# Patient Record
Sex: FEMALE | Employment: OTHER | URBAN - METROPOLITAN AREA
[De-identification: names, ages, dates, MRNs, and addresses within clinical notes are randomized per-mention and may not be internally consistent; named-entity substitution may affect disease eponyms.]

---

## 2020-12-07 ENCOUNTER — TELEPHONE (OUTPATIENT)
Dept: OBGYN | Facility: CLINIC | Age: 35
End: 2020-12-07

## 2020-12-07 ENCOUNTER — PRENATAL OFFICE VISIT (OUTPATIENT)
Dept: NURSING | Facility: CLINIC | Age: 35
End: 2020-12-07
Payer: COMMERCIAL

## 2020-12-07 VITALS — HEIGHT: 62 IN

## 2020-12-07 DIAGNOSIS — Z34.90 SUPERVISION OF NORMAL PREGNANCY: Primary | ICD-10-CM

## 2020-12-07 DIAGNOSIS — O21.9 NAUSEA AND VOMITING IN PREGNANCY: Primary | ICD-10-CM

## 2020-12-07 PROCEDURE — 99207 PR NO CHARGE NURSE ONLY: CPT

## 2020-12-07 RX ORDER — PRENATAL VIT/IRON FUM/FOLIC AC 27MG-0.8MG
1 TABLET ORAL DAILY
COMMUNITY

## 2020-12-07 SDOH — ECONOMIC STABILITY: FOOD INSECURITY: WITHIN THE PAST 12 MONTHS, YOU WORRIED THAT YOUR FOOD WOULD RUN OUT BEFORE YOU GOT MONEY TO BUY MORE.: NOT ASKED

## 2020-12-07 SDOH — ECONOMIC STABILITY: TRANSPORTATION INSECURITY
IN THE PAST 12 MONTHS, HAS LACK OF TRANSPORTATION KEPT YOU FROM MEETINGS, WORK, OR FROM GETTING THINGS NEEDED FOR DAILY LIVING?: NOT ASKED

## 2020-12-07 SDOH — HEALTH STABILITY: MENTAL HEALTH: HOW OFTEN DO YOU HAVE A DRINK CONTAINING ALCOHOL?: NOT ASKED

## 2020-12-07 SDOH — HEALTH STABILITY: MENTAL HEALTH: HOW MANY STANDARD DRINKS CONTAINING ALCOHOL DO YOU HAVE ON A TYPICAL DAY?: NOT ASKED

## 2020-12-07 SDOH — ECONOMIC STABILITY: INCOME INSECURITY: HOW HARD IS IT FOR YOU TO PAY FOR THE VERY BASICS LIKE FOOD, HOUSING, MEDICAL CARE, AND HEATING?: NOT ASKED

## 2020-12-07 SDOH — ECONOMIC STABILITY: FOOD INSECURITY: WITHIN THE PAST 12 MONTHS, THE FOOD YOU BOUGHT JUST DIDN'T LAST AND YOU DIDN'T HAVE MONEY TO GET MORE.: NOT ASKED

## 2020-12-07 SDOH — HEALTH STABILITY: MENTAL HEALTH: HOW OFTEN DO YOU HAVE 6 OR MORE DRINKS ON ONE OCCASION?: NOT ASKED

## 2020-12-07 SDOH — ECONOMIC STABILITY: TRANSPORTATION INSECURITY
IN THE PAST 12 MONTHS, HAS THE LACK OF TRANSPORTATION KEPT YOU FROM MEDICAL APPOINTMENTS OR FROM GETTING MEDICATIONS?: NOT ASKED

## 2020-12-07 NOTE — TELEPHONE ENCOUNTER
I did this pt's NPN phone visit today.  She is requesting medication for nausea.  I advised B6 and unisom, she states in her last pregnancy that combo made her feel horrible.  Discussed bland diet, daron, eating small frequent amounts, etc.    Can you send in Rx?  Pharmacy selected.    Regi Sawyer RN

## 2020-12-07 NOTE — PROGRESS NOTES
NPN nurse visit done over the phone. Pt will be given NPN folder and book at her upcoming appt.   Discussed optional screening available to assess chromosomal anomalies. Questions answered. Pt advised to call the clinic if she has any questions or concerns related to her pregnancy. Prenatal labs will be obtained at her upcoming appt. New prenatal visit scheduled on 12/15/220 with Dr Kinney.    6w3d    Last PAP 5/24/19-Normal         Patient supplied answers from flow sheet for:  Prenatal OB Questionnaire.  Past Medical History  Diabetes?: (!) Yes(GDM with first pregnancy)  Hypertension : No  Heart disease, mitral valve prolapse or rheumatic fever?: No  An autoimmune disease such as lupus or rheumatoid arthritis?: No  Kidney disease or urinary tract infection?: No  Epilepsy, seizures or spells?: No  Migraine headaches?: No  A stroke or loss of function or sensation?: No  Any other neurological problems?: No  Have you ever been treated for depression?: No  Are you having problems with crying spells or loss of self-esteem?: No  Have you ever required psychiatric care?: No  Have you ever had hepatitis, liver disease or jaundice?: No  Have you been treated for blood clots in your veins, deep vein thromosis, inflammation in the veins, thrombosis, phlebitis, pulmonary embolism or varicosities?: No  Have you had excessive bleeding after surgery or dental work?: No  Do you bleed more than other women after a cut or scratch?: No  Do you have a history of anemia?: No  Have you ever had thyroid problems or taken thyroid medication?: No   Do you have any endocrine problems?: No  Have you ever been in a major accident or suffered serious trauma?: No  Within the last year, has anyone hit, slapped, kicked or otherwise hurt you?: No  In the last year, has anyone forced you to have sex when you didn't want to?: No    Past Medical History 2   Have you ever received a blood transfusion?: No  Would you refuse a blood transfusion if a  doctor judged it to be medically necessary?: No   If you answered Yes, would you rather die than receive a blood transfusion?: Unknown  If you answered Yes, is this for Spiritism reasons?: Unknown  Does anyone in your home smoke?: No  Do you use tobacco products?: No  Do you drink beer, wine or hard liquor?: No  Do you use any of the following: marijuana, speed, cocaine, heroin, hallucinogens or other drugs?: No   Is your blood type Rh negative?: No  Have you ever had abnormal antibodies in your blood?: No  Have you ever had asthma?: No  Have you ever had tuberculosis?: No  Do you have any allergies to drugs or over-the-counter medications?: No  Allergies: Dust Mites, Aspartame, Ethanol, Venlafaxine, Hydrochloride, Sertraline: No  Have you had any breast problems?: No  Have you ever ?: No  Have you had any gynecological surgical procedures such as cervical conization, a LEEP procedure, laser treatment, cryosurgery of the cervix or a dilation and curettage, etc?: No  Have you ever had any other surgical procedures?: No  Have you been hospitalized for a nonsurgical reason excluding normal delivery?: No  Have you ever had any anesthetic complications?: No  Have you ever had an abnormal pap smear?: No    Past Medical History (Continued)  Do you have a history of abnormalities of the uterus?: Unknown  Did your mother take CHEMA or any other hormones when she was pregnant with you?: Unknown  Did it take you more than a year to become pregnant?: No  Have you ever been evaluated or treated for infertility?: No  Is there a history of medical problems in your family, which you feel may be important to this pregnancy?: No  Do you have any other problems we have not asked about which you feel may be important to this pregnancy?: No    Symptoms since last menstrual period  Do you have any of the following symptoms: abdominal pain, blood in stools or urine, chest pain, shortness of breath, coughing or vomiting up blood,  your heart racing or skipping beats, nausea and vomiting, pain on urination or vaginal discharge or bleed: No  Will the patient be 35 years old or older at the time of delivery?: No    Has the patient, baby's father or anyone in either family had:  Thalassemia (Italian, Greek, Mediterranean or  background only) and an MCV result less than 80?: No  Neural tube defect such as meningomyelocele, spina bifida or anencephaly?: No  Congenital heart defect?: No  Down's Syndrome?: No  Konrad-Sachs disease (Yazidi, Cajun, Belarusian-Ventress)?: No  Sickle cell disease or trait ()?: No  Hemophilia or other inherited problems of blood?: No  Muscular dystrophy?: No  Cystic fibrosis?: No  Beaver's chorea?: No  Mental retardation/autism?: No  If yes, was the person tested for fragile X?: No  Any other inherited genetic or chromosomal disorder?: No  Maternal metabolic disorder (e.g Insulin-dependent diabetes, PKU)?: No  A child with birth defects not listed above?: No  Recurrent pregnancy loss or stillbirth?: No   Has the patient had any medications/street drugs/alcohol since her last menstrual period?: No  Does the patient or baby's father have any other genetic risks?: No    Infection History   Do you object to being tested for Hepatitis B?: No  Do you object to being tested for HIV?: No   Do you feel that you are at high risk for coming in contact with the AIDS virus?: No  Have you ever been treated for tuberculosis?: No  Have you ever had a positive skin test for tuberculosis?: No  Do you live with someone who has tuberculosis?: No  Have you ever been exposed to tuberculosis?: No  Do you have genital herpes?: No  Does your partner have genital herpes?: No  Have you had a viral illness since your last period?: No  Have you ever had gonorrhea, chlamydia, syphilis, venereal warts, trichomoniasis, pelvic inflammatory disease or any other sexually transmitted disease?: No  Do you know if you are a genital group B  streptococcus carrier?: No  Have you had chicken pox/varicella?: No   Have you been vaccinated against chicken Pox?: (!) Yes  Have you had any other infectious diseases?: No

## 2020-12-08 RX ORDER — ONDANSETRON 4 MG/1
4 TABLET, ORALLY DISINTEGRATING ORAL EVERY 8 HOURS PRN
Qty: 30 TABLET | Refills: 0 | Status: SHIPPED | OUTPATIENT
Start: 2020-12-08 | End: 2020-12-15

## 2020-12-09 ENCOUNTER — ANCILLARY PROCEDURE (OUTPATIENT)
Dept: ULTRASOUND IMAGING | Facility: CLINIC | Age: 35
End: 2020-12-09
Payer: COMMERCIAL

## 2020-12-09 DIAGNOSIS — Z34.90 SUPERVISION OF NORMAL PREGNANCY: ICD-10-CM

## 2020-12-09 PROCEDURE — 76801 OB US < 14 WKS SINGLE FETUS: CPT | Performed by: OBSTETRICS & GYNECOLOGY

## 2020-12-10 NOTE — RESULT ENCOUNTER NOTE
Inform patient that her dating ultrasound showed a live intrauterine pregnancy with a heart beat of 176 beats per minute. Her final due date will remain July 4th which is based on her LMP and consistent with the ultrasound due date.   The one other finding that was seen on her ultrasound was the presence of a small subchorionic hemorrhage. These tend to resolve spontaneously on their own and generally a follow-up ultrasound is not needed. Of course, if bleeding occurs, then she is to repeat ultrasound, but for the moment, if she is asymptomatic, then a repeat ultrasound is not necessary.     Gregoria Kaba MD

## 2020-12-15 ENCOUNTER — PRENATAL OFFICE VISIT (OUTPATIENT)
Dept: OBGYN | Facility: CLINIC | Age: 35
End: 2020-12-15
Payer: COMMERCIAL

## 2020-12-15 ENCOUNTER — TRANSFERRED RECORDS (OUTPATIENT)
Dept: HEALTH INFORMATION MANAGEMENT | Facility: CLINIC | Age: 35
End: 2020-12-15

## 2020-12-15 VITALS
DIASTOLIC BLOOD PRESSURE: 72 MMHG | HEART RATE: 77 BPM | SYSTOLIC BLOOD PRESSURE: 120 MMHG | BODY MASS INDEX: 28.79 KG/M2 | WEIGHT: 157.4 LBS

## 2020-12-15 DIAGNOSIS — Z30.2 REQUEST FOR STERILIZATION: ICD-10-CM

## 2020-12-15 DIAGNOSIS — Z34.81 PRENATAL CARE, SUBSEQUENT PREGNANCY IN FIRST TRIMESTER: Primary | ICD-10-CM

## 2020-12-15 DIAGNOSIS — O21.9 NAUSEA AND VOMITING IN PREGNANCY: ICD-10-CM

## 2020-12-15 DIAGNOSIS — Z87.59 HISTORY OF PRE-ECLAMPSIA: ICD-10-CM

## 2020-12-15 DIAGNOSIS — Z86.32 HISTORY OF GESTATIONAL DIABETES: ICD-10-CM

## 2020-12-15 DIAGNOSIS — Z98.891 HISTORY OF C-SECTION: ICD-10-CM

## 2020-12-15 LAB
ABO + RH BLD: NORMAL
ABO + RH BLD: NORMAL
ALBUMIN UR-MCNC: NEGATIVE MG/DL
ALT SERPL W P-5'-P-CCNC: 20 U/L (ref 0–50)
APPEARANCE UR: CLEAR
AST SERPL W P-5'-P-CCNC: 8 U/L (ref 0–45)
BILIRUB UR QL STRIP: NEGATIVE
BLD GP AB SCN SERPL QL: NORMAL
BLOOD BANK CMNT PATIENT-IMP: NORMAL
COLOR UR AUTO: YELLOW
CREAT SERPL-MCNC: 0.54 MG/DL (ref 0.52–1.04)
ERYTHROCYTE [DISTWIDTH] IN BLOOD BY AUTOMATED COUNT: 13.9 % (ref 10–15)
GFR SERPL CREATININE-BSD FRML MDRD: >90 ML/MIN/{1.73_M2}
GLUCOSE UR STRIP-MCNC: NEGATIVE MG/DL
HCT VFR BLD AUTO: 38.2 % (ref 35–47)
HGB BLD-MCNC: 12.7 G/DL (ref 11.7–15.7)
HGB UR QL STRIP: NEGATIVE
KETONES UR STRIP-MCNC: NEGATIVE MG/DL
LEUKOCYTE ESTERASE UR QL STRIP: NEGATIVE
MCH RBC QN AUTO: 26.3 PG (ref 26.5–33)
MCHC RBC AUTO-ENTMCNC: 33.2 G/DL (ref 31.5–36.5)
MCV RBC AUTO: 79 FL (ref 78–100)
NITRATE UR QL: NEGATIVE
PH UR STRIP: 7 PH (ref 5–7)
PLATELET # BLD AUTO: 250 10E9/L (ref 150–450)
PROT UR-MCNC: <0.05 G/L
PROT/CREAT 24H UR: NORMAL G/G CR (ref 0–0.2)
RBC # BLD AUTO: 4.82 10E12/L (ref 3.8–5.2)
SOURCE: NORMAL
SP GR UR STRIP: 1.01 (ref 1–1.03)
SPECIMEN EXP DATE BLD: NORMAL
UROBILINOGEN UR STRIP-ACNC: 0.2 EU/DL (ref 0.2–1)
WBC # BLD AUTO: 10.4 10E9/L (ref 4–11)

## 2020-12-15 PROCEDURE — 84156 ASSAY OF PROTEIN URINE: CPT | Performed by: OBSTETRICS & GYNECOLOGY

## 2020-12-15 PROCEDURE — 86780 TREPONEMA PALLIDUM: CPT | Mod: 90 | Performed by: OBSTETRICS & GYNECOLOGY

## 2020-12-15 PROCEDURE — 99207 PR FIRST OB VISIT: CPT | Performed by: OBSTETRICS & GYNECOLOGY

## 2020-12-15 PROCEDURE — 99000 SPECIMEN HANDLING OFFICE-LAB: CPT | Performed by: OBSTETRICS & GYNECOLOGY

## 2020-12-15 PROCEDURE — 86850 RBC ANTIBODY SCREEN: CPT | Performed by: OBSTETRICS & GYNECOLOGY

## 2020-12-15 PROCEDURE — 85027 COMPLETE CBC AUTOMATED: CPT | Performed by: OBSTETRICS & GYNECOLOGY

## 2020-12-15 PROCEDURE — 99N1153 PR STATISTIC PREPARENT STANDARD PANEL: Mod: 90 | Performed by: OBSTETRICS & GYNECOLOGY

## 2020-12-15 PROCEDURE — 87340 HEPATITIS B SURFACE AG IA: CPT | Performed by: OBSTETRICS & GYNECOLOGY

## 2020-12-15 PROCEDURE — 86762 RUBELLA ANTIBODY: CPT | Performed by: OBSTETRICS & GYNECOLOGY

## 2020-12-15 PROCEDURE — 81003 URINALYSIS AUTO W/O SCOPE: CPT | Performed by: OBSTETRICS & GYNECOLOGY

## 2020-12-15 PROCEDURE — 86900 BLOOD TYPING SEROLOGIC ABO: CPT | Performed by: OBSTETRICS & GYNECOLOGY

## 2020-12-15 PROCEDURE — 99N1100 PR STATISTIC VERIFI PRENATAL TRISOMY 21,18,13: Mod: 90 | Performed by: OBSTETRICS & GYNECOLOGY

## 2020-12-15 PROCEDURE — 36415 COLL VENOUS BLD VENIPUNCTURE: CPT | Performed by: OBSTETRICS & GYNECOLOGY

## 2020-12-15 PROCEDURE — 87086 URINE CULTURE/COLONY COUNT: CPT | Performed by: OBSTETRICS & GYNECOLOGY

## 2020-12-15 PROCEDURE — 84450 TRANSFERASE (AST) (SGOT): CPT | Performed by: OBSTETRICS & GYNECOLOGY

## 2020-12-15 PROCEDURE — 84460 ALANINE AMINO (ALT) (SGPT): CPT | Performed by: OBSTETRICS & GYNECOLOGY

## 2020-12-15 PROCEDURE — 86901 BLOOD TYPING SEROLOGIC RH(D): CPT | Performed by: OBSTETRICS & GYNECOLOGY

## 2020-12-15 PROCEDURE — 82565 ASSAY OF CREATININE: CPT | Performed by: OBSTETRICS & GYNECOLOGY

## 2020-12-15 PROCEDURE — 87389 HIV-1 AG W/HIV-1&-2 AB AG IA: CPT | Performed by: OBSTETRICS & GYNECOLOGY

## 2020-12-15 RX ORDER — ASPIRIN 81 MG/1
81 TABLET, CHEWABLE ORAL DAILY
Qty: 60 TABLET | Refills: 1 | Status: SHIPPED | OUTPATIENT
Start: 2020-12-15 | End: 2021-03-17

## 2020-12-15 RX ORDER — ONDANSETRON 4 MG/1
4 TABLET, ORALLY DISINTEGRATING ORAL EVERY 8 HOURS PRN
Qty: 30 TABLET | Refills: 0 | Status: ON HOLD | OUTPATIENT
Start: 2020-12-15 | End: 2021-06-23

## 2020-12-15 NOTE — NURSING NOTE
"Chief Complaint   Patient presents with     Prenatal Care     New Prenatal visit   11 weeks 2 days        Initial /72 (BP Location: Left arm, Patient Position: Chair, Cuff Size: Adult Regular)   Pulse 77   Wt 71.4 kg (157 lb 6.4 oz)   LMP 2020 (Exact Date)   Breastfeeding No   BMI 28.79 kg/m   Estimated body mass index is 28.79 kg/m  as calculated from the following:    Height as of 20: 1.575 m (5' 2\").    Weight as of this encounter: 71.4 kg (157 lb 6.4 oz).  BP completed using cuff size: regular    Questioned patient about current smoking habits.  Pt. has never smoked.    11w2d      The following HM Due: NONE    +Nausea better with Zofran   +Questions about Progenity       Saray Navarrete, CMA on 12/15/2020 at 1:09 PM                "

## 2020-12-15 NOTE — PROGRESS NOTES
Kelsea is a 35 year old  @ 11w2d by LMP c/w 11w2d ultrasound,  here for new ob visit.      Reports that nausea has improved since starting Zofran PO; requests a refill of this.   Otherwise reports doing well.   With the exception of one episode of spotting that occurred 2 weeks ago, she denies vaginal bleeding and pelvic cramping.   She reports normal bladder and bowel habits.     ROS: Ten point review of systems was reviewed and negative except the above.    OBhx: C-sec x 1 (low transverse  section with 2 layer uterine closure; operative report accessible on Care Everywhere) at 36w6d for spontaneous labor (PPROM) in the setting of breech fetal presentation; that pregnancy was also complicated by intrauterine growth restriction, gestational diabetes mellitus and per-eclampsia as well as antibody screen abnormality    Gyne: Denies hx of STIs and abnormal Pap smears.     No past medical history on file.  Past Surgical History:   Procedure Laterality Date      SECTION       There are no active problems to display for this patient.     No Known Allergies       ondansetron (ZOFRAN-ODT) 4 MG ODT tab, Take 1 tablet (4 mg) by mouth every 8 hours as needed for nausea       Prenatal Vit-Fe Fumarate-FA (PRENATAL MULTIVITAMIN W/IRON) 27-0.8 MG tablet, Take 1 tablet by mouth daily    No current facility-administered medications on file prior to visit.       Past Medical History of Father of Baby:   No significant medical history    Physical Exam: /72 (BP Location: Left arm, Patient Position: Chair, Cuff Size: Adult Regular)   Pulse 77   Wt 71.4 kg (157 lb 6.4 oz)   LMP 2020 (Exact Date)   Breastfeeding No   BMI 28.79 kg/m    General: Well developed, well nourished female  Skin: No lesions, Warm, moist and No cyanosis or pallor  HEENT: Atraumatic, normocephalic   Neck: Supple,no adenopathy,thyroid normal  Chest: Clear to auscultation  Heart: Regular rate, rhythm  Breasts: deferred    Abdomen: Soft, flat, non-tender   Extremities: No cyanosis, clubbing, warm and well perfused and No edema  Neurological: Mental Status Normal and Station and Gait Normal   Perineum: deferred   Vulva: deferred  Vagina: deferred  Cervix: deferred  Uterus: deferred  Adnexa: deferred  Rectum: deferred      Dating ultrasound 2020  INDICATIONS FOR ULTRASOUND:  OB History: Previous   Present Conditions: Initial S&D (0-17 weeks)   CLINICAL INFORMATION   LMP:  27 Sep 20 - sure EDC:     EGA:  10w3d                                                                                ===================   MEASUREMENTS  Gest Sac: vis        Position:nl    Contour:smooth/regular.   Yolk sac: NV  CRL:  4.46cm.      EGA:   11w2d    U/S EDC:   correspond  Cardiac Activity:Yes     FHR:  176 bpm reg   Rt Ov: 2.8 x 2.6 x 2.3 cm   Wnl  Lt Ov: 3.0 x 2.3 x 1.1 cm    Wnl  Cul de sac: no free fluid   *Other Findings: Subchorionic hemorrhage  1.6 x 0.9 x 1.8 cm     ======================================  Impression:   Early obstetric transabdominal ultrasound.   Frye live intrauterine pregnancy.    EDC by LMP consistent with today's ultrasound EDC.  Recommend EDC 2021.   Fetal anomalies may be present but not detected.  Normal amniotic fluid seen.   Gestational sac is Normal.  Placenta is not seen as is appropriate for early gestational age.   Uterus is anteverted and normal.  Small subchorionic hemorrhage seen in lower uterine segment.   Right ovary Normal.  Left ovary Normal.   No cul-de-sac fluid.        Ramírez Rodriguez MD  Obstetrics & Gynecology  [facility]       A/P 35 year old  at 11w2d by LMP c/w 11w2d ultrasound     1. Discussed physician coverage, tertiary support, diet, exercise, weight gain, schedule of visits, routine and indicated ultrasounds, and childbirth education.    2. Options for  testing for chromosomal and birth defects were discussed with the patient including  nuchal lucency/blood marker testing in the first trimester and quad screening and/or Level 2 ultrasound in the second trimester.  We discussed that these are screening tests and not diagnostic tests and that false positives and negatives are a distinct possibility.  We discussed that follow up diagnostic testing would include chorionic villus sampling or amniocentesis depending on gestational age.    3. Hx of gestational HTN  -- baseline HELLP labs ordered  -- recommended baby ASA to start at 14 week gestation as preventative measure against pre-eclampsia    4. Hx of GDM  -- recommended early GDM screening; patient will plan for this at her next visit     5. Prenatal labs, GC & Chlamydia (urine). Pap deferred given hx of NILM Pap in 2019 (review Care Everywhere)     4. Prenatal Vitamins encouraged    5. RTC in 4 weeks. SAB precautions reviewed     Gregoria Kaba MD  [facility]

## 2020-12-16 LAB
BACTERIA SPEC CULT: NORMAL
HBV SURFACE AG SERPL QL IA: NONREACTIVE
HIV 1+2 AB+HIV1 P24 AG SERPL QL IA: NONREACTIVE
Lab: NORMAL
RUBV IGG SERPL IA-ACNC: 149 IU/ML
SPECIMEN SOURCE: NORMAL
T PALLIDUM AB SER QL: NONREACTIVE

## 2020-12-21 ENCOUNTER — TELEPHONE (OUTPATIENT)
Dept: OBGYN | Facility: CLINIC | Age: 35
End: 2020-12-21

## 2020-12-21 LAB
LAB SCANNED RESULT: ABNORMAL
LAB SCANNED RESULT: NORMAL

## 2020-12-21 NOTE — TELEPHONE ENCOUNTER
Results received from Progenity testing in [place] triage..  Testing done:  Innatal Prenatal Screen and Preparent Carrier Screen    Action:  Made a copy of ABNORMAL results and placed them on provider desk (a copy will remain in triage).  Please advise.    Gender:  Will ask pt if they wish to know the gender.    Routed to provider as NICOLA.

## 2020-12-22 NOTE — TELEPHONE ENCOUNTER
Reviewed negative NIPT with patient. Gender was not disclosed. Also reviewed positive alpha thalassemia carrier testing with results showing one defective gene out of four noted. Reassurance given that this is the mildest version of alpha thalassemia and no interventions needed at this time.     Gregoria Kaba MD

## 2021-01-12 ENCOUNTER — PRENATAL OFFICE VISIT (OUTPATIENT)
Dept: OBGYN | Facility: CLINIC | Age: 36
End: 2021-01-12
Payer: COMMERCIAL

## 2021-01-12 ENCOUNTER — TRANSCRIBE ORDERS (OUTPATIENT)
Dept: MATERNAL FETAL MEDICINE | Facility: CLINIC | Age: 36
End: 2021-01-12

## 2021-01-12 VITALS
OXYGEN SATURATION: 98 % | WEIGHT: 158.3 LBS | DIASTOLIC BLOOD PRESSURE: 72 MMHG | BODY MASS INDEX: 28.95 KG/M2 | SYSTOLIC BLOOD PRESSURE: 126 MMHG | HEART RATE: 99 BPM

## 2021-01-12 DIAGNOSIS — Z34.81 PRENATAL CARE, SUBSEQUENT PREGNANCY IN FIRST TRIMESTER: ICD-10-CM

## 2021-01-12 DIAGNOSIS — O26.90 PREGNANCY RELATED CONDITION, ANTEPARTUM: Primary | ICD-10-CM

## 2021-01-12 DIAGNOSIS — Z86.32 HISTORY OF GESTATIONAL DIABETES: Primary | ICD-10-CM

## 2021-01-12 LAB — GLUCOSE 1H P 50 G GLC PO SERPL-MCNC: 218 MG/DL (ref 60–129)

## 2021-01-12 PROCEDURE — 99000 SPECIMEN HANDLING OFFICE-LAB: CPT | Performed by: OBSTETRICS & GYNECOLOGY

## 2021-01-12 PROCEDURE — 99207 PR PRENATAL VISIT: CPT | Performed by: OBSTETRICS & GYNECOLOGY

## 2021-01-12 PROCEDURE — 36415 COLL VENOUS BLD VENIPUNCTURE: CPT | Performed by: OBSTETRICS & GYNECOLOGY

## 2021-01-12 PROCEDURE — 81511 FTL CGEN ABNOR FOUR ANAL: CPT | Mod: 90 | Performed by: OBSTETRICS & GYNECOLOGY

## 2021-01-12 PROCEDURE — 82950 GLUCOSE TEST: CPT | Performed by: OBSTETRICS & GYNECOLOGY

## 2021-01-12 NOTE — PROGRESS NOTES
Doing well.   C/o fatigue and at time shortness of breath.   Also dealing with constipation that she suspects is associated with Zofran PO use; has changed her stool softener to MiraLax.   Denies VB, cramping.   /76 (BP Location: Right arm, Cuff Size: Adult Regular)   Pulse 100   Wt 71.8 kg (158 lb 4.8 oz)   LMP 2020 (Exact Date)   Breastfeeding No   BMI 28.95 kg/m    General Appearance: NAD  Abdomen: Gravid, NT  Refer to flow sheet above.   A/P: 35 year old  at 15w2d  -- above concerns addressed, reassurance provided  -- hx of pre-eclampsia: normal baseline HELLP labs; taking baby ASA  -- hx of GDM: early 1 hr GCT today  -- offered MS-AFP testing; patient agrees  -- MFM anatomy ultrasound ordered given alpha thalassemia   -- hx of : desires repeat (tentative date 3/28) at 39+ weeks with tubal ligation as she desires sterilization; to sign federal tubal papers due to state funded medical insurance  RTC in 4 weeks    Gregoria Kaba MD  [facility]

## 2021-01-14 DIAGNOSIS — Z34.81 PRENATAL CARE, SUBSEQUENT PREGNANCY IN FIRST TRIMESTER: ICD-10-CM

## 2021-01-14 DIAGNOSIS — R73.09 ABNORMAL GLUCOSE TOLERANCE TEST: Primary | ICD-10-CM

## 2021-01-14 NOTE — RESULT ENCOUNTER NOTE
Inform patient that her early 1 hr GCT was abnormal. The value of her test result was high and reveals that she likely has gestational diabetes. Given this, please schedule a diabetic educator appointment with patient.   Thank you.     Gregoria Kaba MD

## 2021-01-15 ENCOUNTER — TELEPHONE (OUTPATIENT)
Dept: OBGYN | Facility: CLINIC | Age: 36
End: 2021-01-15

## 2021-01-15 LAB
# FETUSES US: NORMAL
# FETUSES: NORMAL
AFP ADJ MOM AMN: 0.95
AFP SERPL-MCNC: 27 NG/ML
AGE - REPORTED: 34 YR
CURRENT SMOKER: NO
CURRENT SMOKER: NO
DIABETES STATUS PATIENT: NO
FAMILY MEMBER DISEASES HX: NO
FAMILY MEMBER DISEASES HX: NO
GA METHOD: NORMAL
GA METHOD: NORMAL
GA: NORMAL WK
HCG MOM SERPL: 0.81
HCG SERPL-ACNC: NORMAL IU/L
HX OF HEREDITARY DISORDERS: NO
IDDM PATIENT QL: NO
INHIBIN A MOM SERPL: 0.54
INHIBIN A SERPL-MCNC: 88 PG/ML
INTEGRATED SCN PATIENT-IMP: NORMAL
IVF PREGNANCY: NO
LMP START DATE: NORMAL
MONOCHORIONIC TWINS: NO
PATHOLOGY STUDY: NORMAL
PREV FETUS DEFECT: NO
SERVICE CMNT-IMP: NO
SPECIMEN DRAWN SERPL: NORMAL
U ESTRIOL MOM SERPL: 1.13
U ESTRIOL SERPL-MCNC: 0.98 NG/ML
VALPROIC/CARBAMAZEPINE STATUS: NO
WEIGHT UNITS: NORMAL

## 2021-01-15 NOTE — TELEPHONE ENCOUNTER
Diabetes Education Scheduling Outreach #1:    Call to patient to schedule. Left message with phone number to call to schedule.    Plan for 2nd outreach attempt within 1 business day.    [facility] OnCall  Diabetes and Nutrition Scheduling

## 2021-01-17 NOTE — RESULT ENCOUNTER NOTE
Inform patient that her maternal serum AFP returned negative for open neural tube defects.     Gregoria Kaba MD

## 2021-01-18 NOTE — TELEPHONE ENCOUNTER
Diabetes Education Scheduling Outreach #2:    Call to patient to schedule. Left message with phone number to call to schedule.    Ann Us  [place] [facility]  Diabetes and Nutrition Scheduling

## 2021-01-19 ENCOUNTER — PATIENT OUTREACH (OUTPATIENT)
Dept: EDUCATION SERVICES | Facility: CLINIC | Age: 36
End: 2021-01-19
Attending: OBSTETRICS & GYNECOLOGY
Payer: COMMERCIAL

## 2021-01-19 DIAGNOSIS — R73.09 ABNORMAL GLUCOSE TOLERANCE TEST: ICD-10-CM

## 2021-01-19 DIAGNOSIS — Z34.81 PRENATAL CARE, SUBSEQUENT PREGNANCY IN FIRST TRIMESTER: ICD-10-CM

## 2021-01-19 PROCEDURE — G0108 DIAB MANAGE TRN  PER INDIV: HCPCS | Mod: TEL

## 2021-01-19 RX ORDER — LANCETS
EACH MISCELLANEOUS
Qty: 100 EACH | Refills: 3 | Status: SHIPPED | OUTPATIENT
Start: 2021-01-19 | End: 2021-05-13

## 2021-01-19 RX ORDER — BLOOD-GLUCOSE METER
1 EACH MISCELLANEOUS ONCE
Qty: 1 KIT | Refills: 0 | Status: SHIPPED | OUTPATIENT
Start: 2021-01-19 | End: 2021-01-19

## 2021-01-19 RX ORDER — BLOOD SUGAR DIAGNOSTIC
STRIP MISCELLANEOUS
Qty: 200 STRIP | Refills: 4 | Status: SHIPPED | OUTPATIENT
Start: 2021-01-19 | End: 2021-03-24

## 2021-01-19 NOTE — PROGRESS NOTES
Diabetes Self-Management Education & Support      SUBJECTIVE/OBJECTIVE:  Presents via telephone for education related to gestational diabetes.    Type of Service: Telephone Visit    How would patient like to obtain AVS? Mirtha    Accompanied by: Self  Diabetes management related comments/concerns: Patient is wondering if we can retest at 24 weeks for GDM  Gestational weeks: 16w2d  [facility]ospital planned [facility] delivery: [facility]  Number of previous preganancies: 1(6 year old girl)  Had any babies over 9 lbs: No  Previously had Gestational Diabetes: Yes  Had Diabetes Education before: Yes  Previous insulin or other diabetes medication during that preganancy: No  Have you ever had thyroid problems or taken thyroid medication?: No  Heart disease, mitral valve prolapse or rheumatic fever?: No  Hypertension : No  High Cholesterol: Unknown  High Triglycerides: Unknown  Do you use tobacco products?: No  Do you drink beer, wine or hard liquor?: No    Cultural Influences/Ethnic Background:  Choose not to answer    Estimated Date of Delivery: 2021    1 hour OGTT  Lab Results   Component Value Date    GLU1 218 (H) 2021       3 hour OGTT    Fasting  No results found for: GLF    1 hour  No results found for: GL1    2 hour  No results found for: GL2    3 hour  No results found for: GL3    Lifestyle and Health Behaviors:  Pre-pregnancy weight (lbs): 153  Exercise:: Currently not exercising  Meals include: Breakfast, Dinner  Breakfast: tea with toast OR pancakes  Lunch: Banana OR smoothie - frozen fruits with water and some honey  Dinner: 5-6 pm: protein with rice and vegetable- eating smaller portions  Snacks: Taki  Beverages: Tea, Water(CIB chocolate milk)  Pre- vitamin?: Yes(some days skips because it makes her nauseaus)  Supplements?: No  Experiencing nausea?: No  Experiencing heartburn?: No    Healthy Coping:       Current Management:  Taking medications for gestational diabetes?:  No    ASSESSMENT:  Reviewed target blood glucose values, sharps disposal, diagnosis criteria for GDM and importance of good blood glucose management for health of mom and baby. Patient advised to call if 3 blood glucose elevated before returns next week. Instructed on ketone checking and told to call if unable to get ketones negative.     Discussed carbohydrate sources and impact on blood glucose. Reviewed basics of healthy eating and incorporating a variety of foods into meal plan. Instructed on carbohydrate counting and label reading and recommended patient consume 2-3 CHO for breakfast, 3-4 CHO for lunch and dinner and 1-2 CHO for each snack, 3 snacks a day. Suggested plate method to balance meals.     Discussed importance of not going too low in carbohydrates since that may cause liver to produce excess glucose and contribute to elevated blood glucose readings and ketone formation. Encouraged eating breakfast within 1 hour of waking.  To also help prevent against ketone formation, protein was encouraged with meals and snacks, especially with the night snack. Reviewed benefits of exercising to help lower blood glucose and walking after meals, as tolerated and per MD approval, if seeing elevated blood glucose after a meal. Pt verbalized understanding of concepts discussed and recommendations provided.    Estimated Energy Needs: 2000 kcal/day    INTERVENTION:  Patient was instructed on Accu-Chek Guide Me meter     Educational topics covered today:  GDM diagnosis, pathophysiology, Risks and Complications of GDM, Means of controlling GDM, Using a Blood Glucose Monitor, Blood Glucose Goals, Logging and Interpreting Glucose Results, Ketone Testing, When to Call a Diabetes Educator or OB Provider, Healthy Eating During Pregnancy, Counting Carbohydrates, Meal Planning for GDM, and Physical Activity    Educational materials emailed to patient prior to appointment:   [facility] Understanding Gestational Diabetes  GDM Log  Book  Sharps Disposal  Care After Delivery    Pt verbalized understanding of concepts discussed and recommendations provided today.     PLAN:  Check glucose 4 times daily, before breakfast and 1 hour after each meal.     Check Ketones daily for one week, if negative, reduce testing to once a week.     Physical activity recommended: yes.    Meal plan: 2-3 carbs at breakfast, 3-4 carbs at lunch, 3-4 carbs at supper, 1-2 carbs at 3 snacks a day.  Follow consistent CHO meal plan, eat CHO and protein/fat at all meals/snacks.    Call/e-mail/OxTherahart message diabetes educator if 3 or more blood sugars are above the goal in 1 week, if ketones are positive, or with questions/concerns.    Meka Echevarria RD, LD, CDE  Time Spent: 50 minutes  Encounter Type: Individual    Any diabetes medication dose changes were made via the CDE Protocol and Collaborative Practice Agreement with the patient's referring provider. A copy of this encounter was shared with the provider.

## 2021-01-28 ENCOUNTER — VIRTUAL VISIT (OUTPATIENT)
Dept: EDUCATION SERVICES | Facility: CLINIC | Age: 36
End: 2021-01-28
Payer: COMMERCIAL

## 2021-01-28 ENCOUNTER — TELEPHONE (OUTPATIENT)
Dept: EDUCATION SERVICES | Facility: CLINIC | Age: 36
End: 2021-01-28

## 2021-01-28 DIAGNOSIS — R73.09 ABNORMAL GLUCOSE TOLERANCE TEST: Primary | ICD-10-CM

## 2021-01-28 DIAGNOSIS — Z34.81 PRENATAL CARE, SUBSEQUENT PREGNANCY IN FIRST TRIMESTER: ICD-10-CM

## 2021-01-28 PROCEDURE — G0108 DIAB MANAGE TRN  PER INDIV: HCPCS | Mod: TEL

## 2021-01-28 RX ORDER — PEN NEEDLE, DIABETIC 30 GX3/16"
1 NEEDLE, DISPOSABLE MISCELLANEOUS DAILY
Qty: 100 EACH | Refills: 3 | Status: SHIPPED | OUTPATIENT
Start: 2021-01-28 | End: 2021-04-05

## 2021-01-28 RX ORDER — HUMAN INSULIN 100 [IU]/ML
15 INJECTION, SUSPENSION SUBCUTANEOUS AT BEDTIME
Qty: 15 ML | Refills: 3 | Status: ON HOLD | OUTPATIENT
Start: 2021-01-28 | End: 2021-02-13

## 2021-01-28 NOTE — TELEPHONE ENCOUNTER
Met with Kelsea for gestational diabetes follow-up. See Virtual Visit dated 1/28/2021 for details. Fasting glucoses and after breakfast glucoses are nearly all above goal.     Ketones: negative 86% of the time in the last week.   Fasting blood glucoses: 13% in target.  After breakfast: 33% in target.  After lunch: 57% in target.  After dinner: 56% in target.    Recommend to start NPH insulin at bedtime - 0.2 units/kg starting dose at weight of 78.1 kg = 15 units at bedtime.     Orders for insulin and pen needles pended for provider review and signature, if in agreement.    Elli Araiza, MPH, RDN, LD, CDCES

## 2021-01-28 NOTE — TELEPHONE ENCOUNTER
Hello,   I'm not sure if this is the correct pool to use for patients of Dr. Kaba's, please let me know if it is not. I am unsure if Dr. Kaba is available to help with reviewing insulin start recommendations for this patient with gestational diabetes or if a covering provider could assist? Orders are pended for provider review and signature.     Thank you!   Elli Araiza, MPH, RDN, LD, [facility]     Routing comment

## 2021-01-28 NOTE — PATIENT INSTRUCTIONS
"Check glucose 4 times daily.  Check ketones once a week when readings are consistently negative.  Continue with recommended physical activity.  Continue to follow recommended meal plan: 2 carbs at breakfast, 3-4 carbs at lunch, 3-4 carbs at supper, 1-2 carbs at snacks.  Follow consistent CHO meal plan, eat CHO and protein/fat at all meals/snacks.    Insulin start - 15 units Novolin N Flexpen at bedtime recommended. Awaiting orders from Dr. Kaba.    Taking Insulin:    1. Take NPH (Novolin-N) - 15 units at bedtime.     - Do a 2 unit \"prime\" before each injection, be sure a stream of insulin comes out of the needle before you give your injection.    - After you inject, hold the needle under the skin to the count of 10 to be sure all of the insulin goes in.     - Rotate injection sites, keeping at least 1 inch apart from last injection site and 2 inches away from belly button or surgical scars.    2. Pen - Use a new pen needle for each injection. Always remove pen needle from the insulin pen after use and do not store insulin pens with the needle on the pen.     3. Store insulin you are not using in the refrigerator (do not freeze). Take new insulin out of the refrigerator a few hours prior to use to bring to room temperature.     4. Once opened NPH Pens (Novolin N) can be kept at room temperature for 28 days after opened. Do not use the opened insulin after this time has passed, even if there is still medicine inside.     5. Always carry your blood sugar meter and a sugar source like glucose tablets with you in case of a low blood sugar. Treat a low blood sugar (less than 70) with 15 grams of carbohydrate (1 carb choice). Wait 15 minutes, recheck blood sugar. If blood sugar is still below 70, repeat the treatment.    6. Call your doctor or diabetes educator if you begin having low blood sugars or if you have questions or concerns.     7. Follow-up: Follow-up diabetes education appointment scheduled on 2/1/21 at " 10:30 am - telephone visit with Viridiana Walsh.    [place] [facility] for the [facility] Area:  For Your Diabetes Education or Nutrition Appointments Call:  [phone]   For Diabetes or Nutrition Related Questions:   Phone: [phone]  Send Zachary Prell Message   If you need a medication refill please contact your pharmacy. Please allow 3 business days for your refills to be completed.

## 2021-01-28 NOTE — PROGRESS NOTES
Diabetes Self-Management Education & Support    Type of Service: Telephone Visit    How would patient like to obtain AVS? Mail a copy    SUBJECTIVE/OBJECTIVE:  Presents for education related to gestational diabetes.    Accompanied by: Self  Diabetes management related comments/concerns: Patient is wondering if we can retest at 24 weeks for GDM  Gestational weeks: 17w4d  Number of previous preganancies: 1(6 year old girl)  Had any babies over 9 lbs: No  Previously had Gestational Diabetes: Yes  Had Diabetes Education before: Yes  Previous insulin or other diabetes medication during that preganancy: No  Have you ever had thyroid problems or taken thyroid medication?: No  Heart disease, mitral valve prolapse or rheumatic fever?: No  Hypertension : No  High Cholesterol: Unknown  High Triglycerides: Unknown  Do you use tobacco products?: No  Do you drink beer, wine or hard liquor?: No    Cultural Influences/Ethnic Background:  Choose not to answer      LMP 09/27/2020 (Exact Date)     Wt Readings from Last Encounter:   01/12/21 71.8 kg (158 lb 4.8 oz)     Unable to obtain today's weight due to virtual visit    Estimated Date of Delivery: Jul 4, 2021    Blood Glucose/Ketone Log:     Date Ketones Fasting Post Breakfast Post Lunch Post Supper   1/19 - - - - 143 (2 toast with PB and tea)   1/20 - 93 170 (116*) 113 (2 hot dogs/cheese) 143 (2 PB toast with tea) / 131 10:31 pm (shrimp, rice, water)   1/21 Small 96 146 (pancakes, egg, cooney, oranges) 155 (rice and shrimp, small amount of ice cream) 143 (fried plantains with eggs and tea)   1/22 Neg  102 143 (2 toast, PB, tea) 147 (brandy bread with potatoes) 140 (brandy bread with potatoes)   1/23 Neg 105 100 (1 Toast, PB, tea) 120 (Fried chicken, wontons, spring rolls  124 (can of coke, wings)   1/24 - - - - -   1/25 Neg 113 No breakfast 158 (coke, wings) 154 (rice, beans, fish, salad, marlin smoothie)   1/26 Neg 102 150 (1 PB toast with tea) 134 (1 PB toast with tea) 106 (beans,  sausage, marlin, water)    Neg 100 100 (1 PB toast, tea with sugar) 130 (chicken burger, sprite) 117 (1 PB toast and tea)    Neg 113        When not getting proper sleep sees that fasting glucose is higher, over the weekend Thursday, Friday, Saturday, , Monday - going to bed after 2 am - trying to clean the house    Lifestyle and Health Behaviors:  Pre-pregnancy weight (lbs): 153  Exercise:: Currently not exercising    Meals include: Breakfast, Lunch, Dinner, snacks    Portions of rice are usually about 1 cup  Was using white bread, switched to whole wheat    Sometimes bedtime snack, sometimes not depending on when she eats dinner    Beverages: Regular soda, Tea (usually unsweetened), Water (CIB chocolate milk)    How many servings of fruits/vegetables per day: 2  Pre-cristopher vitamin?: Yes(some days skips because it makes her nauseaus)  Supplements?: No  Experiencing nausea?: No  Experiencing heartburn?: No    Healthy Coping:  Emotional response to diabetes: Ready to learn, Concern for health and well-being, Shock/Denial/Bargaining  Informal Support system:: Family  Stage of change: ACTION (Actively working towards change)    Current Management:  Taking medications for gestational diabetes?: No  Difficulty affording diabetes testing supplies?: No    ASSESSMENT:  Ketones: negative 86% of the time in the last week.   Fasting blood glucoses: 13% in target.  After breakfast: 33% in target.  After lunch: 57% in target.  After dinner: 56% in target.    Fasting glucose levels nearly all above goal. Patient sometimes has bedtime snack and sometimes skips because dinner is about an hour before bedtime. Patient reports she has been going to bed late and her sleep hasn't been very good. Given elevated fasting glucose, recommend insulin start - NPH at bedtime, 0.2 units/kg at 78.1 kg = 15 units. Patient is making adjustments to diet and post-prandial glucose results have improved in the last 2 days. Advised continued  efforts to follow meal plan, ensure appropriate carbohydrate intake, add non-starchy vegetables to meals and include protein and heart healthy fats at meals for balance.     INTERVENTION:  Educational topics covered today:  What to expect after delivery, Future testing for Type 2 diabetes (2 hour OGTT at 6 week post-partum check-up and annual fasting blood glucose level), Risk of GDM and planning ahead for future pregnancies, Recommended lifestyle interventions for reducing the risk of Type 2 Diabetes, When to Call a Diabetes Educator or OB Provider    Educational Materials provided today:  No new materials provided today.    E-mail to patient with insulin instructions and video per her request.    PLAN:  Check glucose 4 times daily.  Check ketones once a week when readings are consistently negative.  Continue with recommended physical activity.  Continue to follow recommended meal plan: 2 carbs at breakfast, 3-4 carbs at lunch, 3-4 carbs at supper, 1-2 carbs at snacks.  Follow consistent CHO meal plan, eat CHO and protein/fat at all meals/snacks.    Message routed to Dr. Kaba with insulin start recommendations pended for signature, if in agreement. Will outreach to patient once insulin prescription has been sent to let her know she can  from pharmacy and start.    Call/e-mail/Expanhart message diabetes educator if 3 or more blood sugars are above the goal in 1 week or if ketones are positive.    Elli Araiza, MPH, RDN, LD, CDCES   Time Spent: 60 minutes  Encounter Type: Individual    Any diabetes medication dose changes were made via the CDE Protocol and Collaborative Practice Agreement with the patient's OB/GYN provider. A copy of this encounter was shared with the provider.

## 2021-01-29 NOTE — TELEPHONE ENCOUNTER
Signed   Dr. Dinorah Santacruz,     Obstetrics and Gynecology  [facility] - [place] and [place]

## 2021-01-29 NOTE — TELEPHONE ENCOUNTER
Called patient to inform that insulin prescription was sent to pharmacy. No answer, left message that her prescription was sent in and she may  and start tonight. Also emailed patient with this information and instructional videos as discussed at our visit and requested yesterday.    Elli Araiza, MPH, RDN, LD, CDCES

## 2021-02-01 ENCOUNTER — VIRTUAL VISIT (OUTPATIENT)
Dept: EDUCATION SERVICES | Facility: CLINIC | Age: 36
End: 2021-02-01
Payer: COMMERCIAL

## 2021-02-01 DIAGNOSIS — O24.312 PRE-EXISTING DIABETES MELLITUS AFFECTING PREGNANCY IN SECOND TRIMESTER, ANTEPARTUM: Primary | ICD-10-CM

## 2021-02-01 DIAGNOSIS — R73.09 ABNORMAL GLUCOSE TOLERANCE TEST: Primary | ICD-10-CM

## 2021-02-01 PROCEDURE — 98966 PH1 ASSMT&MGMT NQHP 5-10: CPT | Mod: TEL

## 2021-02-01 NOTE — TELEPHONE ENCOUNTER
Patient is currently awaiting a PA for Novolin N flexpen, however I can give her a free voucher for Humulin N flexpen (same insulin, different ) that can be used while we wait for PA determination. Please sign pended order for Humulin N (with voucher information) if you are in agreement.    THank you!  Viridiana Walsh RD LD CDE

## 2021-02-01 NOTE — PROGRESS NOTES
Gestational Diabetes Follow-up    Subjective/Objective:    Kelsea Adams was called for a scheduled BG review. Last date of communication was: 1/28/2021    Gestational diabetes is being managed with medications    Taking diabetes medications:   yes:     Diabetes Medication(s)     Insulin       insulin isophane human (NOVOLIN N FLEXPEN) 100 UNIT/ML injection    Inject 15 Units Subcutaneous At Bedtime May substitute with Humulin N Kwikpen if preferred by insurance plan.          Estimated Date of Delivery: Jul 4, 2021    Blood Glucose/Ketone Log:    Date Ketones Fasting Post Breakfast Post Lunch Post Supper   1/28  - 182 164 -   1/29  97 111 (PB toast with tea) 163 (noodles) 132 (noodles)   1/30  101 126 (PB toast with tea) 132 (chicken noodle soup) 104 (fish with rice may)   1/31  103  - 127 (chicken noodle soup) 130 (fish may and rice)   2/1  96        Peanut butter bread, tea, sometimes with banana for bedtime snack       Assessment:    Ketones: na.   Fasting blood glucoses: 0% in target.  After breakfast: 66% in target.  After lunch: 50% in target.  After dinner: 100% in target.    Not able to start insulin yet -- patient says the pharmacy is waiting to hear from the OB clinic.     Plan/Response:  Spoke with pharmacy about prescription - they state they sent a PA, and it would be needed for both humulin n and novolin n. [facility] states that they did not receive a PA at all, so called pharmacy back to ask them to fax PA request to [phone] with STAT on the letter.   Follow up with patient Thursday    KEVIN Mix CDE  Time Spent: 9:06 minutes    Any diabetes medication dose changes were made via the CDE Protocol and Collaborative Practice Agreement with the patient's OB/GYN provider. A copy of this encounter was shared with the provider.

## 2021-02-02 ENCOUNTER — TELEPHONE (OUTPATIENT)
Dept: EDUCATION SERVICES | Facility: CLINIC | Age: 36
End: 2021-02-02

## 2021-02-02 NOTE — TELEPHONE ENCOUNTER
I saw that the Rx for the Humulin N pens (to be used with FREE Anya coupon) was signed off by the provider. I contacted Kelsea and she said she just heard from her [facility] that they need to order in the Humulin N pens. Hopefully she will be able to pick-up tomorrow.    I asked her to call us back if she runs into any problems. I reviewed we used a FREE coupon, so there should be no cost for the insulin this time while the PA is being completed for the Novolin N pens.    Christina Gautam RN, [facility]

## 2021-02-04 ENCOUNTER — VIRTUAL VISIT (OUTPATIENT)
Dept: EDUCATION SERVICES | Facility: CLINIC | Age: 36
End: 2021-02-04
Payer: COMMERCIAL

## 2021-02-04 DIAGNOSIS — O24.312 PRE-EXISTING DIABETES MELLITUS AFFECTING PREGNANCY IN SECOND TRIMESTER, ANTEPARTUM: Primary | ICD-10-CM

## 2021-02-04 PROCEDURE — 98966 PH1 ASSMT&MGMT NQHP 5-10: CPT | Mod: 95

## 2021-02-04 NOTE — PROGRESS NOTES
Gestational Diabetes Follow-up    Subjective/Objective:    Kelsea Adams was called for a scheduled BG review. Last date of communication was: 2/1/21.    Gestational diabetes is being managed with medications    Taking diabetes medications:   yes:     Diabetes Medication(s)     Insulin       insulin isophane human (HUMULIN N PEN) 100 UNIT/ML injection    15 units before bed + 2 unit prime = 17 units TDD (Expiration Date: 12/31/21 RXBIN # - [id number]      N # - [id number]      Group # [id number])     insulin isophane human (NOVOLIN N FLEXPEN) 100 UNIT/ML injection    Inject 15 Units Subcutaneous At Bedtime May substitute with Humulin N Kwikpen if preferred by insurance plan.      **Neither of these have been started yet    Estimated Date of Delivery: Jul 4, 2021    Blood Glucose/Ketone Log:    Date Fasting   2/2 96   2/3 99   2/4 94         Assessment:    Fasting blood glucoses: 33% in target.    Patient was hoping that she may not need the insulin anymore since she was <95 this morning, however I did tell her it was still recommended to start. I had spoken to the pharmacy this morning, and they did run the Humulin coupon and have the insulin in stock. She will pick it up today and start injections.    Plan/Response:  Recommend that patient begin NPH insulin - 15 units at bedtime  Follow-up on Monday.    KEVIN Mix CDE  Time Spent: 6:45 minutes    Any diabetes medication dose changes were made via the CDE Protocol and Collaborative Practice Agreement with the patient's OB/GYN provider. A copy of this encounter was shared with the provider.

## 2021-02-05 ENCOUNTER — PRE VISIT (OUTPATIENT)
Dept: MATERNAL FETAL MEDICINE | Facility: CLINIC | Age: 36
End: 2021-02-05

## 2021-02-08 ENCOUNTER — VIRTUAL VISIT (OUTPATIENT)
Dept: EDUCATION SERVICES | Facility: CLINIC | Age: 36
End: 2021-02-08
Payer: COMMERCIAL

## 2021-02-08 DIAGNOSIS — Z53.9 NO SHOW: Primary | ICD-10-CM

## 2021-02-08 NOTE — PROGRESS NOTES
Attempted to call patient, left voicemail x2 asking her to leave her numbers on our triage line. She will need to be scheduled for follow up.    Viridiana Walsh RD LD CDE      This patient was a no show for this scheduled appointment.

## 2021-02-10 ENCOUNTER — TELEPHONE (OUTPATIENT)
Dept: EDUCATION SERVICES | Facility: CLINIC | Age: 36
End: 2021-02-10

## 2021-02-10 NOTE — TELEPHONE ENCOUNTER
Pt left a message that she missed appt with Viridiana on Monday. Per Viridiana's note patient will need a new appt. Please contact her to schedule a new appt. She can be in a 30 minute spot since it is a GDM follow-up for insulin adjustment.    Thanks so much!    Christina Gautam RN, [facility]

## 2021-02-11 ENCOUNTER — TELEPHONE (OUTPATIENT)
Dept: OBGYN | Facility: CLINIC | Age: 36
End: 2021-02-11

## 2021-02-11 NOTE — TELEPHONE ENCOUNTER
Viridiana,    I know you said you might be able to fit pt in sooner than 2/22. I don't see anything scheduled, so just wanted to send this message to you to keep patient on your radar.    Thanks so much!!    Christina Gautam RN, [facility]

## 2021-02-11 NOTE — TELEPHONE ENCOUNTER
Patient callinw4d  C/o upper abdominal pain x 2 days.  Getting worse.  Has been able to sleep, but pain wakes her up.  Feels like a big gas bubble.  Has tried gas X and tums without relief.  Baby has been fluttering.  Appt scheduled.  Sravani Bland RN

## 2021-02-12 ENCOUNTER — HOSPITAL ENCOUNTER (OUTPATIENT)
Dept: ULTRASOUND IMAGING | Facility: CLINIC | Age: 36
End: 2021-02-12
Attending: OBSTETRICS & GYNECOLOGY
Payer: COMMERCIAL

## 2021-02-12 ENCOUNTER — OFFICE VISIT (OUTPATIENT)
Dept: MATERNAL FETAL MEDICINE | Facility: CLINIC | Age: 36
End: 2021-02-12
Attending: OBSTETRICS & GYNECOLOGY
Payer: COMMERCIAL

## 2021-02-12 DIAGNOSIS — O26.90 PREGNANCY RELATED CONDITION, ANTEPARTUM: ICD-10-CM

## 2021-02-12 DIAGNOSIS — D56.3 THALASSEMIA ALPHA CARRIER: Primary | ICD-10-CM

## 2021-02-12 DIAGNOSIS — O24.112 PRE-EXISTING TYPE 2 DIABETES MELLITUS DURING PREGNANCY IN SECOND TRIMESTER: ICD-10-CM

## 2021-02-12 DIAGNOSIS — E11.9 TYPE 2 DIABETES MELLITUS WITHOUT COMPLICATION, WITHOUT LONG-TERM CURRENT USE OF INSULIN (H): Primary | ICD-10-CM

## 2021-02-12 PROCEDURE — 76811 OB US DETAILED SNGL FETUS: CPT | Mod: 26 | Performed by: OBSTETRICS & GYNECOLOGY

## 2021-02-12 PROCEDURE — 76817 TRANSVAGINAL US OBSTETRIC: CPT | Mod: 26 | Performed by: OBSTETRICS & GYNECOLOGY

## 2021-02-12 PROCEDURE — 76817 TRANSVAGINAL US OBSTETRIC: CPT

## 2021-02-12 PROCEDURE — 96040 HC GENETIC COUNSELING, EACH 30 MINUTES: CPT | Performed by: GENETIC COUNSELOR, MS

## 2021-02-12 NOTE — PROGRESS NOTES
[facility]  Genetic Counseling Consult    Patient:  Kelsea Adams YOB: 1985   Date of Service:  21      Kelsea Adams was seen at the [facility] for genetic consultation as part of her appointment for comprehensive ultrasound.        Impression/Plan:   1. Kelsea had a comprehensive (level II) ultrasound today.  Please see the ultrasound report for further details.    2. Kelsea had Innatal NIPT earlier in pregnancy which was low risk female. AFP WNL. Kelsea is aware that genetic amniocentesis will remain available to her.     3. Kelsea was identified to be a silent carrier for alpha thalassemia. We reviewed this result today as well as carrier screening options for her partner, Babak. She was provided with written information regarding alpha thalassemia as well as my direct contact information if the couple is interested in pursuing additional testing.     4. Plan for fetal echocardiogram given preexisting maternal diabetes.     Pregnancy History:   /Parity:    Age at Delivery: 35 year old  LOR: 2021, by Last Menstrual Period  Gestational Age: 19w5d    No significant complications or exposures were reported in the current pregnancy.    Kelsea s pregnancy history is significant for one  c/s delivery.    Medical History:   Kelsea has a history of preexisting diabetes managed with insulin. The average pregnancy has a 3-5% chance of having a baby with a birth defect. Maternal diabetes increases that chance to 6-10% and possibly up to 20% if it is poorly controlled in the first trimester. These birth defects can include spinal cord defects (spina bifida), heart defects, skeletal defects, and defects in the urinary, reproductive, and digestive systems. Diabetes in the pregnancy can also lead to complications such as pre-eclampsia, polyhydramnios, and  delivery. Fetal  echocardiogram is typically recommended.        Family History:   A three-generation pedigree was obtained, and is scanned under the  Media  tab.   The following significant findings were reported by Kelsea:    Kelsea's partner, Babak is 35 and healthy. He has two daughters from a previous relationship who are reportedly healthy.     Kelsea's sister was reported to have a history of three miscarriages. Kelsea is not aware of any identified underlying genetic cause for her sister's recurrent pregnancy loss.   Kelsea shared that the couple's daughter is currently undergoing evaluation for a possible diagnosis of Autism. Autism is a spectrum of developmental disorders characterized by impaired social interaction, problems with verbal and nonverbal communication, and unusual, repetitive, or severely limited activities and interests.  Autism is a heterogeneous disorder, including genetic and non-genetic causes. In a small percentage of individuals with ASD, it is a feature of a certain genetic condition such as Down syndrome, fragile X syndrome, or Rett syndrome. However, in most isolated cases the cause may be multifactorial, meaning a combination of genetic and environmental factors. Given this is a first degree relative to the pregnancy, the chance may be increased if their daughter is given a formal diagnosis. Kelsae was encouraged to continue sharing family history information with their pediatrician.     Otherwise, the reported family history is negative for multiple miscarriages, stillbirths, birth defects, intellectual disability, known genetic conditions, and consanguinity.       Carrier Screening:   The patient reports that she and the father of the pregnancy have [place] ancestry:     Kelsea underwent preparent carrier screening with her primary OB. Kelsea was found to be a silent carrier of alpha thalassemia. We discussed that alpha thalassemia is a genetic condition that  impacts red blood cells ability to efficiently transport oxygen throughout the body.  Individuals typically have 4 copies of the gene responsible for alpha thalassemia.  Individuals have two copies of alpha hemoglobin 1 and two copies of alpha hemoglobin 2.  Deletions of these genes cause alpha thalassemia, and the type of alpha thalassemia depends on the total number of Hb Alpha genes present. Individuals who have 3 copies of hemoglobin alpha are called silent carriers such as Kelsea.  These individuals may have normal hemoglobin electrophoresis testing and frequently never learn they are a carrier.  They are not expected to have any symptoms or health concerns.  Individuals with 2 copies of Hb Alpha instead of four have alpha thalassemia trait/minor. Individuals with trait typically experience no significant health concerns requiring medical management, but may present with mild anemia.  There are two possible configurations of alpha thalassemia trait and risk to offspring is dependent on the configuration of the deletions (cis vs trans). Individuals with 1 copy of Hb Alpha have hemoglobin H disease.  Individuals with this condition have significant anemia and hemolysis, and have specific medical management recommendations. Individuals with 0 copies of Hb Alpha have alpha thalassemia major, which can present as fetal hydrops during pregnancy with poor prognosis. We reviewed that risk to the current pregnancy would depend on Babak's alpha thalassemia carrier status and screening will remain available to him. Kelsea was provided with written information regarding alpha thalassemia as well as my direct contact information if the couple is interested in pursuing additional testing. Kelsea was not found to be a carrier for the remaining conditions. We also reviewed [place]  screening program which includes thalassemia.        Risk Assessment for Chromosome Conditions:   We explained that the risk for  fetal chromosome abnormalities increases with maternal age. We discussed specific features of common chromosome abnormalities, including Down syndrome, trisomy 13, trisomy 18, and sex chromosome trisomies.      - At age 35 at midtrimester, the risk to have a baby with Down syndrome is 1 in 421.     - At age 35 at midtrimester, the risk to have a baby with any chromosome abnormality is 1 in 217.      Kelsea had maternal serum screening earlier in pregnancy.     Non-invasive Prenatal Testing (NIPT)    Maternal plasma cell-free DNA testing    Screens for fetal trisomy 21, trisomy 13, trisomy 18, and sex chromosome aneuploidy    Kelsea had an Innatal test earlier in pregnancy; we reviewed the results today, which are normal for chromosome 13, chromosome 18, chromosome 21 and sex chromosomes (no aneuploidy detected)    Given the accuracy of this test, these results greatly decrease the chance for certain fetal chromosome abnormalities    We discussed the limitations of normal NIPT results    MSAFP (after 15 weeks for open neural tube defect screening) results were within normal limits, which decreased the risk of an open neural tube defect in the pregnancy to 1 in 10,000.         Testing Options:   We discussed the following options:   Genetic Amniocentesis    Invasive procedure typically performed in the second trimester by which amniotic fluid is obtained for the purpose of chromosome analysis and/or other prenatal genetic analysis    Diagnostic results; >99% sensitivity for fetal chromosome abnormalities    AFAFP measurement tests for open neural tube defects     Comprehensive (Level II) ultrasound: Detailed ultrasound performed between 18-22 weeks gestation to screen for major birth defects and markers for aneuploidy.      We reviewed the benefits and limitations of this testing.  Screening tests provide a risk assessment specific to the pregnancy for certain fetal chromosome abnormalities, but cannot  definitively diagnose or exclude a fetal chromosome abnormality.  Follow-up genetic counseling and consideration of diagnostic testing is recommended with any abnormal screening result.     Diagnostic tests carry inherent risks- including risk of miscarriage- that require careful consideration.  These tests can detect fetal chromosome abnormalities with greater than 99% certainty.  Results can be compromised by maternal cell contamination or mosaicism, and are limited by the resolution of cytogenetic G-banding technology.  There is no screening nor diagnostic test that can detect all forms of birth defects or mental disability.    It was a pleasure to be involved with Kelsea de leon care. Face-to-face time of the meeting was 25 minutes.      Judith Mueller MS, [facility]  Licensed Genetic Counselor  [facility]  Maternal Fetal Medicine  Ph: [phone]  [email or web address]

## 2021-02-12 NOTE — PROGRESS NOTES
"Please see \"Imaging\" tab under Chart Review for full details.    Tika Guillen MD  Maternal Fetal Medicine    "

## 2021-02-13 ENCOUNTER — ANESTHESIA (OUTPATIENT)
Dept: SURGERY | Facility: CLINIC | Age: 36
DRG: 818 | End: 2021-02-13
Payer: COMMERCIAL

## 2021-02-13 ENCOUNTER — SURGERY (OUTPATIENT)
Age: 36
End: 2021-02-13
Payer: COMMERCIAL

## 2021-02-13 ENCOUNTER — ANESTHESIA EVENT (OUTPATIENT)
Dept: SURGERY | Facility: CLINIC | Age: 36
DRG: 818 | End: 2021-02-13
Payer: COMMERCIAL

## 2021-02-13 ENCOUNTER — HOSPITAL ENCOUNTER (INPATIENT)
Facility: CLINIC | Age: 36
LOS: 1 days | Discharge: HOME | DRG: 818 | End: 2021-02-14
Attending: EMERGENCY MEDICINE | Admitting: SURGERY
Payer: COMMERCIAL

## 2021-02-13 ENCOUNTER — APPOINTMENT (OUTPATIENT)
Dept: ULTRASOUND IMAGING | Facility: CLINIC | Age: 36
DRG: 818 | End: 2021-02-13
Attending: EMERGENCY MEDICINE
Payer: COMMERCIAL

## 2021-02-13 DIAGNOSIS — K81.9 CHOLECYSTITIS: ICD-10-CM

## 2021-02-13 DIAGNOSIS — Z3A.20 20 WEEKS GESTATION OF PREGNANCY: ICD-10-CM

## 2021-02-13 LAB
ALBUMIN SERPL-MCNC: 3.3 G/DL (ref 3.4–5)
ALBUMIN UR-MCNC: NEGATIVE MG/DL
ALP SERPL-CCNC: 95 U/L (ref 40–150)
ALT SERPL W P-5'-P-CCNC: 18 U/L (ref 0–50)
AMORPH CRY #/AREA URNS HPF: ABNORMAL /HPF
ANION GAP SERPL CALCULATED.3IONS-SCNC: 6 MMOL/L (ref 3–14)
APPEARANCE UR: ABNORMAL
AST SERPL W P-5'-P-CCNC: 7 U/L (ref 0–45)
BASOPHILS # BLD AUTO: 0.1 10E9/L (ref 0–0.2)
BASOPHILS NFR BLD AUTO: 0.6 %
BILIRUB SERPL-MCNC: 0.3 MG/DL (ref 0.2–1.3)
BILIRUB UR QL STRIP: NEGATIVE
BUN SERPL-MCNC: 6 MG/DL (ref 7–30)
CALCIUM SERPL-MCNC: 9.4 MG/DL (ref 8.5–10.1)
CHLORIDE SERPL-SCNC: 106 MMOL/L (ref 94–109)
CO2 SERPL-SCNC: 25 MMOL/L (ref 20–32)
COLOR UR AUTO: ABNORMAL
CREAT SERPL-MCNC: 0.53 MG/DL (ref 0.52–1.04)
DIFFERENTIAL METHOD BLD: ABNORMAL
EOSINOPHIL # BLD AUTO: 0.1 10E9/L (ref 0–0.7)
EOSINOPHIL NFR BLD AUTO: 0.9 %
ERYTHROCYTE [DISTWIDTH] IN BLOOD BY AUTOMATED COUNT: 13.2 % (ref 10–15)
GFR SERPL CREATININE-BSD FRML MDRD: >90 ML/MIN/{1.73_M2}
GLUCOSE BLDC GLUCOMTR-MCNC: 101 MG/DL (ref 70–99)
GLUCOSE BLDC GLUCOMTR-MCNC: 109 MG/DL (ref 70–99)
GLUCOSE BLDC GLUCOMTR-MCNC: 113 MG/DL (ref 70–99)
GLUCOSE BLDC GLUCOMTR-MCNC: 114 MG/DL (ref 70–99)
GLUCOSE BLDC GLUCOMTR-MCNC: 117 MG/DL (ref 70–99)
GLUCOSE BLDC GLUCOMTR-MCNC: 122 MG/DL (ref 70–99)
GLUCOSE BLDC GLUCOMTR-MCNC: 83 MG/DL (ref 70–99)
GLUCOSE SERPL-MCNC: 139 MG/DL (ref 70–99)
GLUCOSE UR STRIP-MCNC: NEGATIVE MG/DL
HBA1C MFR BLD: 5.5 % (ref 0–5.6)
HCT VFR BLD AUTO: 40.6 % (ref 35–47)
HGB BLD-MCNC: 13.2 G/DL (ref 11.7–15.7)
HGB UR QL STRIP: NEGATIVE
IMM GRANULOCYTES # BLD: 0.1 10E9/L (ref 0–0.4)
IMM GRANULOCYTES NFR BLD: 0.6 %
INTERPRETATION ECG - MUSE: NORMAL
KETONES UR STRIP-MCNC: NEGATIVE MG/DL
LABORATORY COMMENT REPORT: NORMAL
LACTATE BLD-SCNC: 1.5 MMOL/L (ref 0.7–2)
LEUKOCYTE ESTERASE UR QL STRIP: NEGATIVE
LYMPHOCYTES # BLD AUTO: 2.6 10E9/L (ref 0.8–5.3)
LYMPHOCYTES NFR BLD AUTO: 20.7 %
MCH RBC QN AUTO: 25.7 PG (ref 26.5–33)
MCHC RBC AUTO-ENTMCNC: 32.5 G/DL (ref 31.5–36.5)
MCV RBC AUTO: 79 FL (ref 78–100)
MONOCYTES # BLD AUTO: 0.9 10E9/L (ref 0–1.3)
MONOCYTES NFR BLD AUTO: 6.8 %
MUCOUS THREADS #/AREA URNS LPF: PRESENT /LPF
NEUTROPHILS # BLD AUTO: 8.8 10E9/L (ref 1.6–8.3)
NEUTROPHILS NFR BLD AUTO: 70.4 %
NITRATE UR QL: NEGATIVE
NRBC # BLD AUTO: 0 10*3/UL
NRBC BLD AUTO-RTO: 0 /100
PH UR STRIP: 8 PH (ref 5–7)
PLATELET # BLD AUTO: 300 10E9/L (ref 150–450)
POTASSIUM SERPL-SCNC: 4.1 MMOL/L (ref 3.4–5.3)
PROT SERPL-MCNC: 7.7 G/DL (ref 6.8–8.8)
RBC # BLD AUTO: 5.13 10E12/L (ref 3.8–5.2)
RBC #/AREA URNS AUTO: 0 /HPF (ref 0–2)
SARS-COV-2 RNA RESP QL NAA+PROBE: NEGATIVE
SODIUM SERPL-SCNC: 137 MMOL/L (ref 133–144)
SOURCE: ABNORMAL
SP GR UR STRIP: 1.01 (ref 1–1.03)
SPECIMEN SOURCE: NORMAL
SQUAMOUS #/AREA URNS AUTO: 2 /HPF (ref 0–1)
TROPONIN I SERPL-MCNC: <0.015 UG/L (ref 0–0.04)
UROBILINOGEN UR STRIP-MCNC: 0 MG/DL (ref 0–2)
WBC # BLD AUTO: 12.5 10E9/L (ref 4–11)
WBC #/AREA URNS AUTO: 1 /HPF (ref 0–5)

## 2021-02-13 PROCEDURE — 81001 URINALYSIS AUTO W/SCOPE: CPT | Performed by: EMERGENCY MEDICINE

## 2021-02-13 PROCEDURE — 83036 HEMOGLOBIN GLYCOSYLATED A1C: CPT | Performed by: EMERGENCY MEDICINE

## 2021-02-13 PROCEDURE — 250N000025 HC SEVOFLURANE, PER MIN: Performed by: SURGERY

## 2021-02-13 PROCEDURE — 87040 BLOOD CULTURE FOR BACTERIA: CPT | Performed by: EMERGENCY MEDICINE

## 2021-02-13 PROCEDURE — 250N000011 HC RX IP 250 OP 636: Performed by: ANESTHESIOLOGY

## 2021-02-13 PROCEDURE — 99222 1ST HOSP IP/OBS MODERATE 55: CPT | Performed by: PHYSICIAN ASSISTANT

## 2021-02-13 PROCEDURE — 250N000012 HC RX MED GY IP 250 OP 636 PS 637: Performed by: PHYSICIAN ASSISTANT

## 2021-02-13 PROCEDURE — 96375 TX/PRO/DX INJ NEW DRUG ADDON: CPT

## 2021-02-13 PROCEDURE — 84484 ASSAY OF TROPONIN QUANT: CPT | Performed by: EMERGENCY MEDICINE

## 2021-02-13 PROCEDURE — 258N000003 HC RX IP 258 OP 636: Performed by: EMERGENCY MEDICINE

## 2021-02-13 PROCEDURE — 370N000017 HC ANESTHESIA TECHNICAL FEE, PER MIN: Performed by: SURGERY

## 2021-02-13 PROCEDURE — 999N001017 HC STATISTIC GLUCOSE BY METER IP

## 2021-02-13 PROCEDURE — 258N000003 HC RX IP 258 OP 636: Performed by: SURGERY

## 2021-02-13 PROCEDURE — 250N000013 HC RX MED GY IP 250 OP 250 PS 637: Performed by: EMERGENCY MEDICINE

## 2021-02-13 PROCEDURE — 258N000001 HC RX 258: Performed by: ANESTHESIOLOGY

## 2021-02-13 PROCEDURE — 999N000141 HC STATISTIC PRE-PROCEDURE NURSING ASSESSMENT: Performed by: SURGERY

## 2021-02-13 PROCEDURE — 96361 HYDRATE IV INFUSION ADD-ON: CPT

## 2021-02-13 PROCEDURE — 76705 ECHO EXAM OF ABDOMEN: CPT

## 2021-02-13 PROCEDURE — 250N000011 HC RX IP 250 OP 636: Performed by: EMERGENCY MEDICINE

## 2021-02-13 PROCEDURE — 87635 SARS-COV-2 COVID-19 AMP PRB: CPT | Performed by: EMERGENCY MEDICINE

## 2021-02-13 PROCEDURE — 99204 OFFICE O/P NEW MOD 45 MIN: CPT | Mod: 57 | Performed by: SURGERY

## 2021-02-13 PROCEDURE — 250N000011 HC RX IP 250 OP 636: Performed by: STUDENT IN AN ORGANIZED HEALTH CARE EDUCATION/TRAINING PROGRAM

## 2021-02-13 PROCEDURE — 360N000076 HC SURGERY LEVEL 3, PER MIN: Performed by: SURGERY

## 2021-02-13 PROCEDURE — C9803 HOPD COVID-19 SPEC COLLECT: HCPCS

## 2021-02-13 PROCEDURE — 80053 COMPREHEN METABOLIC PANEL: CPT | Performed by: EMERGENCY MEDICINE

## 2021-02-13 PROCEDURE — 250N000013 HC RX MED GY IP 250 OP 250 PS 637: Performed by: SURGERY

## 2021-02-13 PROCEDURE — 710N000009 HC RECOVERY PHASE 1, LEVEL 1, PER MIN: Performed by: SURGERY

## 2021-02-13 PROCEDURE — 120N000001 HC R&B MED SURG/OB

## 2021-02-13 PROCEDURE — 272N000001 HC OR GENERAL SUPPLY STERILE: Performed by: SURGERY

## 2021-02-13 PROCEDURE — 99285 EMERGENCY DEPT VISIT HI MDM: CPT | Mod: 25

## 2021-02-13 PROCEDURE — 250N000011 HC RX IP 250 OP 636: Performed by: NURSE ANESTHETIST, CERTIFIED REGISTERED

## 2021-02-13 PROCEDURE — 96365 THER/PROPH/DIAG IV INF INIT: CPT

## 2021-02-13 PROCEDURE — 83605 ASSAY OF LACTIC ACID: CPT | Performed by: EMERGENCY MEDICINE

## 2021-02-13 PROCEDURE — 250N000009 HC RX 250: Performed by: SURGERY

## 2021-02-13 PROCEDURE — 250N000009 HC RX 250: Performed by: EMERGENCY MEDICINE

## 2021-02-13 PROCEDURE — 88304 TISSUE EXAM BY PATHOLOGIST: CPT | Mod: TC | Performed by: SURGERY

## 2021-02-13 PROCEDURE — 47562 LAPAROSCOPIC CHOLECYSTECTOMY: CPT | Performed by: SURGERY

## 2021-02-13 PROCEDURE — 0FT44ZZ RESECTION OF GALLBLADDER, PERCUTANEOUS ENDOSCOPIC APPROACH: ICD-10-PCS | Performed by: SURGERY

## 2021-02-13 PROCEDURE — 88304 TISSUE EXAM BY PATHOLOGIST: CPT | Mod: 26 | Performed by: PATHOLOGY

## 2021-02-13 PROCEDURE — 93005 ELECTROCARDIOGRAM TRACING: CPT

## 2021-02-13 PROCEDURE — 85025 COMPLETE CBC W/AUTO DIFF WBC: CPT | Performed by: EMERGENCY MEDICINE

## 2021-02-13 PROCEDURE — 258N000003 HC RX IP 258 OP 636: Performed by: NURSE ANESTHETIST, CERTIFIED REGISTERED

## 2021-02-13 PROCEDURE — 250N000009 HC RX 250: Performed by: NURSE ANESTHETIST, CERTIFIED REGISTERED

## 2021-02-13 RX ORDER — GLYCOPYRROLATE 0.2 MG/ML
INJECTION, SOLUTION INTRAMUSCULAR; INTRAVENOUS PRN
Status: DISCONTINUED | OUTPATIENT
Start: 2021-02-13 | End: 2021-02-13

## 2021-02-13 RX ORDER — LABETALOL HYDROCHLORIDE 5 MG/ML
10 INJECTION, SOLUTION INTRAVENOUS
Status: DISCONTINUED | OUTPATIENT
Start: 2021-02-13 | End: 2021-02-13 | Stop reason: HOSPADM

## 2021-02-13 RX ORDER — NEOSTIGMINE METHYLSULFATE 1 MG/ML
VIAL (ML) INJECTION PRN
Status: DISCONTINUED | OUTPATIENT
Start: 2021-02-13 | End: 2021-02-13

## 2021-02-13 RX ORDER — SODIUM CHLORIDE, SODIUM LACTATE, POTASSIUM CHLORIDE, CALCIUM CHLORIDE 600; 310; 30; 20 MG/100ML; MG/100ML; MG/100ML; MG/100ML
INJECTION, SOLUTION INTRAVENOUS CONTINUOUS
Status: CANCELLED | OUTPATIENT
Start: 2021-02-13

## 2021-02-13 RX ORDER — ONDANSETRON 4 MG/1
4 TABLET, ORALLY DISINTEGRATING ORAL EVERY 6 HOURS PRN
Status: DISCONTINUED | OUTPATIENT
Start: 2021-02-13 | End: 2021-02-14 | Stop reason: HOSPADM

## 2021-02-13 RX ORDER — NALOXONE HYDROCHLORIDE 0.4 MG/ML
0.4 INJECTION, SOLUTION INTRAMUSCULAR; INTRAVENOUS; SUBCUTANEOUS
Status: DISCONTINUED | OUTPATIENT
Start: 2021-02-13 | End: 2021-02-14 | Stop reason: HOSPADM

## 2021-02-13 RX ORDER — DEXTROSE MONOHYDRATE 25 G/50ML
25-50 INJECTION, SOLUTION INTRAVENOUS
Status: DISCONTINUED | OUTPATIENT
Start: 2021-02-13 | End: 2021-02-14 | Stop reason: HOSPADM

## 2021-02-13 RX ORDER — CEFAZOLIN SODIUM 2 G/100ML
2 INJECTION, SOLUTION INTRAVENOUS
Status: CANCELLED | OUTPATIENT
Start: 2021-02-13

## 2021-02-13 RX ORDER — AMOXICILLIN 250 MG
1 CAPSULE ORAL 2 TIMES DAILY
Status: DISCONTINUED | OUTPATIENT
Start: 2021-02-13 | End: 2021-02-14 | Stop reason: HOSPADM

## 2021-02-13 RX ORDER — ASPIRIN 81 MG/1
81 TABLET, CHEWABLE ORAL DAILY
Status: DISCONTINUED | OUTPATIENT
Start: 2021-02-14 | End: 2021-02-14 | Stop reason: HOSPADM

## 2021-02-13 RX ORDER — PROPOFOL 10 MG/ML
INJECTION, EMULSION INTRAVENOUS PRN
Status: DISCONTINUED | OUTPATIENT
Start: 2021-02-13 | End: 2021-02-13

## 2021-02-13 RX ORDER — AMOXICILLIN 250 MG
2 CAPSULE ORAL 2 TIMES DAILY
Status: DISCONTINUED | OUTPATIENT
Start: 2021-02-13 | End: 2021-02-14 | Stop reason: HOSPADM

## 2021-02-13 RX ORDER — CEFAZOLIN SODIUM 1 G/3ML
1 INJECTION, POWDER, FOR SOLUTION INTRAMUSCULAR; INTRAVENOUS SEE ADMIN INSTRUCTIONS
Status: CANCELLED | OUTPATIENT
Start: 2021-02-13

## 2021-02-13 RX ORDER — ONDANSETRON 4 MG/1
4 TABLET, ORALLY DISINTEGRATING ORAL EVERY 30 MIN PRN
Status: DISCONTINUED | OUTPATIENT
Start: 2021-02-13 | End: 2021-02-13 | Stop reason: HOSPADM

## 2021-02-13 RX ORDER — NICOTINE POLACRILEX 4 MG
15-30 LOZENGE BUCCAL
Status: DISCONTINUED | OUTPATIENT
Start: 2021-02-13 | End: 2021-02-13

## 2021-02-13 RX ORDER — MORPHINE SULFATE 4 MG/ML
4 INJECTION, SOLUTION INTRAMUSCULAR; INTRAVENOUS
Status: COMPLETED | OUTPATIENT
Start: 2021-02-13 | End: 2021-02-13

## 2021-02-13 RX ORDER — HYDROMORPHONE HYDROCHLORIDE 1 MG/ML
.3-.5 INJECTION, SOLUTION INTRAMUSCULAR; INTRAVENOUS; SUBCUTANEOUS EVERY 5 MIN PRN
Status: DISCONTINUED | OUTPATIENT
Start: 2021-02-13 | End: 2021-02-13 | Stop reason: HOSPADM

## 2021-02-13 RX ORDER — FENTANYL CITRATE 50 UG/ML
25-50 INJECTION, SOLUTION INTRAMUSCULAR; INTRAVENOUS
Status: DISCONTINUED | OUTPATIENT
Start: 2021-02-13 | End: 2021-02-13 | Stop reason: HOSPADM

## 2021-02-13 RX ORDER — ONDANSETRON 2 MG/ML
4 INJECTION INTRAMUSCULAR; INTRAVENOUS EVERY 30 MIN PRN
Status: DISCONTINUED | OUTPATIENT
Start: 2021-02-13 | End: 2021-02-13

## 2021-02-13 RX ORDER — DEXTROSE MONOHYDRATE 25 G/50ML
25 INJECTION, SOLUTION INTRAVENOUS ONCE
Status: COMPLETED | OUTPATIENT
Start: 2021-02-13 | End: 2021-02-13

## 2021-02-13 RX ORDER — ACETAMINOPHEN 325 MG/1
650 TABLET ORAL EVERY 4 HOURS PRN
Status: DISCONTINUED | OUTPATIENT
Start: 2021-02-16 | End: 2021-02-14 | Stop reason: HOSPADM

## 2021-02-13 RX ORDER — ONDANSETRON 2 MG/ML
4 INJECTION INTRAMUSCULAR; INTRAVENOUS EVERY 30 MIN PRN
Status: DISCONTINUED | OUTPATIENT
Start: 2021-02-13 | End: 2021-02-13 | Stop reason: HOSPADM

## 2021-02-13 RX ORDER — SODIUM CHLORIDE, SODIUM LACTATE, POTASSIUM CHLORIDE, CALCIUM CHLORIDE 600; 310; 30; 20 MG/100ML; MG/100ML; MG/100ML; MG/100ML
INJECTION, SOLUTION INTRAVENOUS CONTINUOUS PRN
Status: DISCONTINUED | OUTPATIENT
Start: 2021-02-13 | End: 2021-02-13

## 2021-02-13 RX ORDER — ONDANSETRON 2 MG/ML
INJECTION INTRAMUSCULAR; INTRAVENOUS
Status: DISPENSED
Start: 2021-02-13 | End: 2021-02-14

## 2021-02-13 RX ORDER — LIDOCAINE 40 MG/G
CREAM TOPICAL
Status: DISCONTINUED | OUTPATIENT
Start: 2021-02-13 | End: 2021-02-14 | Stop reason: HOSPADM

## 2021-02-13 RX ORDER — FENTANYL CITRATE 50 UG/ML
25-100 INJECTION, SOLUTION INTRAMUSCULAR; INTRAVENOUS
Status: COMPLETED | OUTPATIENT
Start: 2021-02-13 | End: 2021-02-13

## 2021-02-13 RX ORDER — DEXTROSE MONOHYDRATE 25 G/50ML
25-50 INJECTION, SOLUTION INTRAVENOUS
Status: DISCONTINUED | OUTPATIENT
Start: 2021-02-13 | End: 2021-02-13

## 2021-02-13 RX ORDER — ACETAMINOPHEN 325 MG/1
975 TABLET ORAL EVERY 8 HOURS
Status: DISCONTINUED | OUTPATIENT
Start: 2021-02-13 | End: 2021-02-14 | Stop reason: HOSPADM

## 2021-02-13 RX ORDER — ONDANSETRON 2 MG/ML
4 INJECTION INTRAMUSCULAR; INTRAVENOUS EVERY 6 HOURS PRN
Status: DISCONTINUED | OUTPATIENT
Start: 2021-02-13 | End: 2021-02-14 | Stop reason: HOSPADM

## 2021-02-13 RX ORDER — OXYCODONE HYDROCHLORIDE 5 MG/1
5-10 TABLET ORAL
Status: DISCONTINUED | OUTPATIENT
Start: 2021-02-13 | End: 2021-02-14 | Stop reason: HOSPADM

## 2021-02-13 RX ORDER — NICOTINE POLACRILEX 4 MG
15-30 LOZENGE BUCCAL
Status: DISCONTINUED | OUTPATIENT
Start: 2021-02-13 | End: 2021-02-14 | Stop reason: HOSPADM

## 2021-02-13 RX ORDER — SODIUM CHLORIDE, SODIUM LACTATE, POTASSIUM CHLORIDE, CALCIUM CHLORIDE 600; 310; 30; 20 MG/100ML; MG/100ML; MG/100ML; MG/100ML
INJECTION, SOLUTION INTRAVENOUS CONTINUOUS
Status: DISCONTINUED | OUTPATIENT
Start: 2021-02-13 | End: 2021-02-13 | Stop reason: HOSPADM

## 2021-02-13 RX ORDER — NALOXONE HYDROCHLORIDE 0.4 MG/ML
0.2 INJECTION, SOLUTION INTRAMUSCULAR; INTRAVENOUS; SUBCUTANEOUS
Status: DISCONTINUED | OUTPATIENT
Start: 2021-02-13 | End: 2021-02-14 | Stop reason: HOSPADM

## 2021-02-13 RX ORDER — PIPERACILLIN SODIUM, TAZOBACTAM SODIUM 3; .375 G/15ML; G/15ML
3.38 INJECTION, POWDER, LYOPHILIZED, FOR SOLUTION INTRAVENOUS ONCE
Status: COMPLETED | OUTPATIENT
Start: 2021-02-13 | End: 2021-02-13

## 2021-02-13 RX ORDER — SODIUM CHLORIDE 9 MG/ML
INJECTION, SOLUTION INTRAVENOUS CONTINUOUS
Status: DISCONTINUED | OUTPATIENT
Start: 2021-02-13 | End: 2021-02-14 | Stop reason: HOSPADM

## 2021-02-13 RX ORDER — PROPOFOL 10 MG/ML
INJECTION, EMULSION INTRAVENOUS CONTINUOUS PRN
Status: DISCONTINUED | OUTPATIENT
Start: 2021-02-13 | End: 2021-02-13

## 2021-02-13 RX ADMIN — ONDANSETRON 4 MG: 2 INJECTION INTRAMUSCULAR; INTRAVENOUS at 06:16

## 2021-02-13 RX ADMIN — HYDROMORPHONE HYDROCHLORIDE 0.5 MG: 1 INJECTION, SOLUTION INTRAMUSCULAR; INTRAVENOUS; SUBCUTANEOUS at 14:13

## 2021-02-13 RX ADMIN — FENTANYL CITRATE 50 MCG: 50 INJECTION, SOLUTION INTRAMUSCULAR; INTRAVENOUS at 12:43

## 2021-02-13 RX ADMIN — SODIUM CHLORIDE: 9 INJECTION, SOLUTION INTRAVENOUS at 18:08

## 2021-02-13 RX ADMIN — DEXTROSE MONOHYDRATE 25 ML: 500 INJECTION PARENTERAL at 11:07

## 2021-02-13 RX ADMIN — FENTANYL CITRATE 50 MCG: 0.05 INJECTION, SOLUTION INTRAMUSCULAR; INTRAVENOUS at 14:21

## 2021-02-13 RX ADMIN — OXYCODONE HYDROCHLORIDE 5 MG: 5 TABLET ORAL at 18:01

## 2021-02-13 RX ADMIN — OXYCODONE HYDROCHLORIDE 5 MG: 5 TABLET ORAL at 16:49

## 2021-02-13 RX ADMIN — LIDOCAINE HYDROCHLORIDE 30 ML: 20 SOLUTION ORAL; TOPICAL at 06:52

## 2021-02-13 RX ADMIN — INSULIN HUMAN 10 UNITS: 100 INJECTION, SUSPENSION SUBCUTANEOUS at 22:01

## 2021-02-13 RX ADMIN — SODIUM CHLORIDE 1000 ML: 9 INJECTION, SOLUTION INTRAVENOUS at 06:17

## 2021-02-13 RX ADMIN — ACETAMINOPHEN 975 MG: 325 TABLET, FILM COATED ORAL at 16:49

## 2021-02-13 RX ADMIN — SODIUM CHLORIDE, POTASSIUM CHLORIDE, SODIUM LACTATE AND CALCIUM CHLORIDE: 600; 310; 30; 20 INJECTION, SOLUTION INTRAVENOUS at 13:13

## 2021-02-13 RX ADMIN — FENTANYL CITRATE 50 MCG: 0.05 INJECTION, SOLUTION INTRAMUSCULAR; INTRAVENOUS at 14:13

## 2021-02-13 RX ADMIN — PHENYLEPHRINE HYDROCHLORIDE 150 MCG: 10 INJECTION INTRAVENOUS at 12:33

## 2021-02-13 RX ADMIN — HYDROMORPHONE HYDROCHLORIDE 0.5 MG: 1 INJECTION, SOLUTION INTRAMUSCULAR; INTRAVENOUS; SUBCUTANEOUS at 14:04

## 2021-02-13 RX ADMIN — BUPIVACAINE HYDROCHLORIDE AND EPINEPHRINE BITARTRATE 35 ML: 5; .005 INJECTION, SOLUTION EPIDURAL; INTRACAUDAL; PERINEURAL at 13:29

## 2021-02-13 RX ADMIN — ONDANSETRON 4 MG: 2 INJECTION INTRAMUSCULAR; INTRAVENOUS at 13:25

## 2021-02-13 RX ADMIN — FENTANYL CITRATE 50 MCG: 50 INJECTION, SOLUTION INTRAMUSCULAR; INTRAVENOUS at 12:49

## 2021-02-13 RX ADMIN — FENTANYL CITRATE 50 MCG: 0.05 INJECTION, SOLUTION INTRAMUSCULAR; INTRAVENOUS at 13:54

## 2021-02-13 RX ADMIN — SODIUM CHLORIDE, POTASSIUM CHLORIDE, SODIUM LACTATE AND CALCIUM CHLORIDE: 600; 310; 30; 20 INJECTION, SOLUTION INTRAVENOUS at 12:17

## 2021-02-13 RX ADMIN — PHENYLEPHRINE HYDROCHLORIDE 50 MCG: 10 INJECTION INTRAVENOUS at 13:14

## 2021-02-13 RX ADMIN — PIPERACILLIN SODIUM AND TAZOBACTAM SODIUM 3.38 G: 3; .375 INJECTION, POWDER, LYOPHILIZED, FOR SOLUTION INTRAVENOUS at 10:31

## 2021-02-13 RX ADMIN — HYDROMORPHONE HYDROCHLORIDE 0.5 MG: 1 INJECTION, SOLUTION INTRAMUSCULAR; INTRAVENOUS; SUBCUTANEOUS at 14:22

## 2021-02-13 RX ADMIN — ONDANSETRON 4 MG: 2 INJECTION INTRAMUSCULAR; INTRAVENOUS at 16:10

## 2021-02-13 RX ADMIN — PHENYLEPHRINE HYDROCHLORIDE 150 MCG: 10 INJECTION INTRAVENOUS at 12:36

## 2021-02-13 RX ADMIN — HYDROMORPHONE HYDROCHLORIDE 0.5 MG: 1 INJECTION, SOLUTION INTRAMUSCULAR; INTRAVENOUS; SUBCUTANEOUS at 13:54

## 2021-02-13 RX ADMIN — PROPOFOL 150 MCG/KG/MIN: 10 INJECTION, EMULSION INTRAVENOUS at 12:22

## 2021-02-13 RX ADMIN — NEOSTIGMINE METHYLSULFATE 3.5 MG: 1 INJECTION, SOLUTION INTRAVENOUS at 13:33

## 2021-02-13 RX ADMIN — FENTANYL CITRATE 25 MCG: 50 INJECTION, SOLUTION INTRAMUSCULAR; INTRAVENOUS at 13:43

## 2021-02-13 RX ADMIN — SUCCINYLCHOLINE CHLORIDE 80 MG: 20 INJECTION, SOLUTION INTRAMUSCULAR; INTRAVENOUS; PARENTERAL at 12:22

## 2021-02-13 RX ADMIN — ROCURONIUM BROMIDE 20 MG: 10 INJECTION INTRAVENOUS at 12:30

## 2021-02-13 RX ADMIN — GLYCOPYRROLATE 0.6 MG: 0.2 INJECTION, SOLUTION INTRAMUSCULAR; INTRAVENOUS at 13:33

## 2021-02-13 RX ADMIN — OXYCODONE HYDROCHLORIDE 5 MG: 5 TABLET ORAL at 21:21

## 2021-02-13 RX ADMIN — PHENYLEPHRINE HYDROCHLORIDE 100 MCG: 10 INJECTION INTRAVENOUS at 12:41

## 2021-02-13 RX ADMIN — DOCUSATE SODIUM 50 MG AND SENNOSIDES 8.6 MG 1 TABLET: 8.6; 5 TABLET, FILM COATED ORAL at 21:21

## 2021-02-13 RX ADMIN — FENTANYL CITRATE 25 MCG: 50 INJECTION, SOLUTION INTRAMUSCULAR; INTRAVENOUS at 12:57

## 2021-02-13 RX ADMIN — PHENYLEPHRINE HYDROCHLORIDE 100 MCG: 10 INJECTION INTRAVENOUS at 12:30

## 2021-02-13 RX ADMIN — FENTANYL CITRATE 50 MCG: 50 INJECTION, SOLUTION INTRAMUSCULAR; INTRAVENOUS at 12:22

## 2021-02-13 RX ADMIN — PROPOFOL 200 MG: 10 INJECTION, EMULSION INTRAVENOUS at 12:22

## 2021-02-13 RX ADMIN — MORPHINE SULFATE 4 MG: 4 INJECTION, SOLUTION INTRAMUSCULAR; INTRAVENOUS at 06:17

## 2021-02-13 ASSESSMENT — ENCOUNTER SYMPTOMS
COUGH: 0
FEVER: 0
ABDOMINAL PAIN: 1
DYSURIA: 0

## 2021-02-13 ASSESSMENT — ACTIVITIES OF DAILY LIVING (ADL)
ADLS_ACUITY_SCORE: 14
ADLS_ACUITY_SCORE: 14

## 2021-02-13 ASSESSMENT — MIFFLIN-ST. JEOR: SCORE: 1384.01

## 2021-02-13 NOTE — ED TRIAGE NOTES
20 weeks pregnant.  Reports epigastric discomfort radiating into chest.  Denies any contractions.

## 2021-02-13 NOTE — ANESTHESIA PREPROCEDURE EVALUATION
Anesthesia Pre-Procedure Evaluation    Patient: Kelsea Adams   MRN: [id number] : 1985        Preoperative Diagnosis: Cholecystitis [K81.9]   Procedure : Procedure(s):  CHOLECYSTECTOMY, LAPAROSCOPIC     History reviewed. No pertinent past medical history.   Past Surgical History:   Procedure Laterality Date      SECTION  2016      No Known Allergies   Social History     Tobacco Use     Smoking status: Former Smoker     Smokeless tobacco: Never Used   Substance Use Topics     Alcohol use: Not Currently      Wt Readings from Last 1 Encounters:   21 72.6 kg (160 lb)        Anesthesia Evaluation   Pt has had prior anesthetic.     History of anesthetic complications  - PONV.      ROS/MED HX  ENT/Pulmonary:    (-) sleep apnea   Neurologic:       Cardiovascular:       METS/Exercise Tolerance:     Hematologic:       Musculoskeletal:       GI/Hepatic:     (+) cholecystitis/cholelithiasis,  (-) GERD   Renal/Genitourinary:       Endo:       Psychiatric/Substance Use:       Infectious Disease:       Malignancy:       Other: Comment: Pt currently ~20 weeks pregnant           Physical Exam    Airway        Mallampati: I   TM distance: > 3 FB   Neck ROM: full   Mouth opening: > 3 cm    Respiratory Devices and Support         Dental  no notable dental history         Cardiovascular   cardiovascular exam normal          Pulmonary   pulmonary exam normal                OUTSIDE LABS:  CBC:   Lab Results   Component Value Date    WBC 12.5 (H) 2021    WBC 10.4 12/15/2020    HGB 13.2 2021    HGB 12.7 12/15/2020    HCT 40.6 2021    HCT 38.2 12/15/2020     2021     12/15/2020     BMP:   Lab Results   Component Value Date     2021    POTASSIUM 4.1 2021    CHLORIDE 106 2021    CO2 25 2021    BUN 6 (L) 2021    CR 0.53 2021    CR 0.54 12/15/2020     (H) 2021     COAGS: No results found for: PTT, INR, FIBR  POC:   Lab Results    Component Value Date    BGM 83 02/13/2021     HEPATIC:   Lab Results   Component Value Date    ALBUMIN 3.3 (L) 02/13/2021    PROTTOTAL 7.7 02/13/2021    ALT 18 02/13/2021    AST 7 02/13/2021    ALKPHOS 95 02/13/2021    BILITOTAL 0.3 02/13/2021     OTHER:   Lab Results   Component Value Date    LACT 1.5 02/13/2021    ZABRINA 9.4 02/13/2021       Anesthesia Plan    ASA Status:  2, emergent       Anesthesia Type: General.     - Airway: ETT   Induction: RSI, Intravenous.   Maintenance: Balanced.        Consents    Anesthesia Plan(s) and associated risks, benefits, and realistic alternatives discussed. Questions answered and patient/representative(s) expressed understanding.     - Discussed with:  Patient         Postoperative Care    Pain management: IV analgesics.   PONV prophylaxis: Ondansetron (or other 5HT-3), Background Propofol Infusion     Comments:    Consult OB for fetal tone management  No versed, toradol, decadron, nitrous, or other unneeded medication  RSI with [facility]  Propofol gtt, zofran  Maintain BP, ventilation, oxygenation throughout              Rick Myles MD

## 2021-02-13 NOTE — H&P
[facility]  History and Physical   General Surgery  David Fatima MD, MD       Kelsea Adams MRN# [id number]   YOB: 1985 Age: 35 year old      Date of Admission:  2021         Assessment and Plan:   Pleasant healthy 35-year-old female who is 20 weeks pregnant with her second child, now presents with several day history of upper abdominal pain and nausea.  Labs and ultrasound seem consistent with acute cholecystitis.  I feel none operative management with antibiotics and pain control is unlikely to provide resolution.  Also feel that ongoing infection could be more dangerous for the fetus.  Given these considerations, I have recommended urgent laparoscopic cholecystectomy.  Cholecystectomy during the second trimester is associated with greater success and lower likelihood of conversion to open surgery.  These considerations were explained to the patient including the risk of fetal complications or loss.  Patient understands these risks and we will proceed this morning.         Code Status:   Full Code         Primary Care Physician:   No Ref-Primary, Physician None         Chief Complaint:   Upper abdominal pain and nausea    History is obtained from the patient         History of Present Illness:     Kelsea Adams is a 35 year old  female at 20 weeks gestation with a history of gestational diabetes who presents for evaluation of epigastric and RUQ abdominal pain. She reports 4 days of this epigastric and RUQ pain for which she has already tried taking Miralax and Gas-X but without any relief. Her pain is present constantly and will wax and wane during the day, often worse at night. She feels unable to take a full breath in due to worsening pain with deep inhalation. She denies any fever, cough, or chest pain. She also denies any dysuria or vaginal bleeding. L&D was here to see her already and found good fetal heart tones.            Past Medical History:    Kelsea Adams  has no past medical history on file.             Past Surgical History:   Kelsea Adams  has a past surgical history that includes  section (2016).         Home Medications:     Prior to Admission medications    Medication Sig Last Dose Taking? Auth Provider   ACCU-CHEK GUIDE test strip Use to test blood sugar 4 times daily or as directed.   Gregoria Kaba MD   acetone urine (KETOSTIX) test strip Use 1 strip/day with first morning urine until ketones are negative for 7 days in a row, then use 1 strip/week   Gregoria Kaba MD   aspirin (ASA) 81 MG chewable tablet Take 1 tablet (81 mg) by mouth daily   Gregoria Kaba MD   blood glucose monitoring (SOFTCLIX) lancets Use to test blood sugar 4 times daily.   Gregoria Kaba MD   insulin isophane human (HUMULIN N PEN) 100 UNIT/ML injection 15 units before bed + 2 unit prime = 17 units TDD (Expiration Date: 21 RXBIN # - [id number]      N # - [id number]      Group # [id number])   Gregoria Kaba MD   insulin isophane human (NOVOLIN N FLEXPEN) 100 UNIT/ML injection Inject 15 Units Subcutaneous At Bedtime May substitute with Humulin N Kwikpen if preferred by insurance plan.   Dinorah Santacruz DO   Insulin Pen Needle (PEN NEEDLES) 32G X 4 MM MISC 1 each daily   Dinorah Santacruz DO   ondansetron (ZOFRAN-ODT) 4 MG ODT tab Take 1 tablet (4 mg) by mouth every 8 hours as needed for nausea   Gregoria Kaba MD   Prenatal Vit-Fe Fumarate-FA (PRENATAL MULTIVITAMIN W/IRON) 27-0.8 MG tablet Take 1 tablet by mouth daily   Reported, Patient            Allergies:   No Known Allergies         Social History:   Kelsea Adams  reports that she has quit smoking. She has never used smokeless tobacco. She reports previous alcohol use. She reports that she does not use drugs.            Family History:   Kelsea Adams family history includes Diabetes in her father and  "mother; Hypertension in her father and mother; No Known Problems in her brother, sister, and sister.           Review of Systems:   The 10 point Review of Systems is negative other than noted in the HPI.           Physical Exam:   Blood pressure 114/79, pulse 72, temperature 98  F (36.7  C), temperature source Oral, resp. rate 10, height 1.575 m (5' 2\"), weight 72.6 kg (160 lb), last menstrual period 09/27/2020, SpO2 100 %, not currently breastfeeding.  160 lbs 0 oz    Pleasant female in no distress.  Patient has a pleasant affect and communicates well.   Pupils equal round and reactive to light.   No cervical lymphadenopathy or thyromegaly.   Lung fields clear, breathing comfortably.   Heart normal sinus rhythm.  No murmurs rubs or gallops.  Abdomen soft, gravid uterus extends to just above the umbilicus.  Tender in RUQ, subxyphoid area.  Skin warm, dry.  No obvious rashes or lesions.           Data:   All new lab and imaging data was reviewed.   Recent Labs   Lab Test 02/13/21  0553 12/15/20  1403   WBC 12.5* 10.4   HGB 13.2 12.7   MCV 79 79    250      Recent Labs   Lab Test 02/13/21  0553 12/15/20  1403     --    POTASSIUM 4.1  --    CHLORIDE 106  --    CO2 25  --    BUN 6*  --    CR 0.53 0.54   ANIONGAP 6  --    ZABRINA 9.4  --    *  --                       "

## 2021-02-13 NOTE — OP NOTE
General Surgery Operative Note    PREOPERATIVE DIAGNOSIS:  Cholecystitis [K81.9]    POSTOPERATIVE DIAGNOSIS: Same    PROCEDURE:   Procedure(s):  CHOLECYSTECTOMY, LAPAROSCOPIC    ANESTHESIA:  General.    PREOPERATIVE MEDICATIONS:  Ancef IV.    SURGEON:  David Fatima MD    ASSISTANT: Ludmila Douglass MD  A first assistant was necessary owing to challenging laparoscopic visualization and exposure.  Retraction was also necessary.    INDICATIONS: Patient is a 35-year-old female who is 20 weeks pregnant.  She has been having abdominal pain since Tuesday of this week and further evaluation indicated acute cholecystitis.  Discussions were had with the patient's obstetric team and we felt that surgery at this point in her pregnancy was the safest bet.  She now presents for laparoscopic cholecystectomy.  Risks and benefits, including fetal morbidity and loss, were explained to the patient and she agreed to proceed.    PROCEDURE:  The patient was taken to the operating suite and uneventfully endotracheally intubated.  The abdomen was prepped and draped in a sterile fashion.  Surgeon initiated timeout was acknowledged.  We entered the abdomen in the left upper quadrant using Visiport technique.  Three other trocars were placed under laparoscopic visualization.  Patient was placed in steep reverse Trendelenburg position.  We elevated the liver and were able to identify a somewhat inflamed gallbladder which was significantly intrahepatic.  The gallbladder was grasped and used to elevate the liver further.  We began dissecting out some fatty adhesions down near the neck of the gallbladder until a cystic duct was encountered.  We continued our dissection using combination of sharp and blunt dissection until the cystic duct was largely dissected out.  We continued our dissection up along the sides of the gallbladder, both medially and laterally, until we had created a space between the gallbladder and the liver.  A small tear was  made in the side of the gallbladder wall and there was some spillage of biliary sludge which was immediately contained and controlled.  At this point, we encountered the cystic artery, just posterior and lateral to the cystic duct.  This again was dissected out.  Once we had created a window where only the cystic artery and duct were noted to be entering the gallbladder, we felt that this represented our critical view.  The cystic artery and duct were then doubly clipped and divided.  We continued our dissection up along the body of the gallbladder, freeing all attachments and adhesions of the gallbladder to the liver.  Gallbladder was removed from the liver in an atraumatic fashion.  The gallbladder was then brought up through the umbilical port site and removed from the abdomen.  The gallbladder fossa was reinspected, and all areas of bleeding were managed with electrocautery.  We irrigated the area with normal saline and aspirated it out.  We then removed the umbilical port trocar and closed the fascia with a figure-of-eight 0 Vicryl suture.  This was done using the Orville-Isabela device.  We then reinspected the abdomen, and everything appeared to be in pristine condition.  We removed the trocars under laparoscopic visualization and desufflated the abdomen with the Isela suction .  The skin edges were reapproximated with 4-0 Vicryl and Steri-Strips.  The patient was uneventfully extubated, awakened and taken to the PACU in stable condition.  At the conclusion of the case, all lap and needle counts were correct.      ESTIMATED BLOOD LOSS: 20 mL    INTRAOPERATIVE FINDINGS: Acute cholecystitis, appropriate fetal heart tones before and after surgery.    David Fatima MD, MD

## 2021-02-13 NOTE — PROGRESS NOTES
OB Progress Note    Asked by Dr. Fatima to consult and offer guidance from pregnancy standpoint for necessary cholecystectomy.  Naya is a 36yo  at 19+6wks with cholecystitis.  Pregnancy complicated by prior  section, hx of pre-eclampsia with first pregnancy, gestation diabetes and now cholecystitis.  Recommend proceeding with surgery as planned with Dr. Fatima.  Given our pre-viable state, we will obtain preoperative heart tones (already done in ED with FHT 150s) and postoperative heart tones.  Can contact L&D prior to discharge for bedside heart tones tomorrow AM.  Please do not hesitate to call with any additional questions or concerns.    Regi Bearden MD

## 2021-02-13 NOTE — ANESTHESIA PROCEDURE NOTES
Airway   Date/Time: 2/13/2021 12:24 PM   Patient location during procedure: OR  Staff -   Anesthesiologist:  Rick Myles MD  CRNA: Lorena Albarran APRN CRNA  Performed By: CRNA    Consent for Airway   Urgency: elective    Indications and Patient Condition  Indications for airway management: valeriano-procedural  Induction type:RSIMask difficulty assessment: 0 - not attempted    Final Airway Details  Final airway type: endotracheal airway  Successful airway:ETT - single  Endotracheal Airway Details   ETT size (mm): 7.0  Cuffed: yes  Successful intubation technique: direct laryngoscopy  Grade View of Cords: 1  Adjucts: stylet  Measured from: gums/teeth  Secured at (cm): 21  Secured with: pink tape  Bite block used: None    Post intubation assessment   Placement verified by: capnometry, equal breath sounds and chest rise   Number of attempts at approach: 1  Secured with:pink tape  Ease of procedure: easy  Dentition: Intact and Unchanged

## 2021-02-13 NOTE — ANESTHESIA POSTPROCEDURE EVALUATION
Patient: Kelsea Adams    Procedure(s):  CHOLECYSTECTOMY, LAPAROSCOPIC    Diagnosis:Cholecystitis [K81.9]  Diagnosis Additional Information: No value filed.    Anesthesia Type:  General    Note:  Disposition: Inpatient   Postop Pain Control: Uneventful            Sign Out: Well controlled pain   PONV: No   Neuro/Psych: Uneventful            Sign Out: Acceptable/Baseline neuro status   Airway/Respiratory: Uneventful            Sign Out: Acceptable/Baseline resp. status   CV/Hemodynamics: Uneventful            Sign Out: Acceptable CV status   Other NRE: NONE   DID A NON-ROUTINE EVENT OCCUR? No    Event details/Postop Comments:  Fetal tones checked in PACU         Last vitals:  Vitals:    02/13/21 1440 02/13/21 1450 02/13/21 1500   BP: 133/80 136/79 124/80   Pulse: 70 78 76   Resp: 14 18 19   Temp: 37.4  C (99.3  F) 37.4  C (99.3  F) 37.4  C (99.3  F)   SpO2: 97% 98% 99%       Last vitals prior to Anesthesia Care Transfer:  CRNA VITALS  2/13/2021 1314 - 2/13/2021 1414      2/13/2021             Pulse:  104    SpO2:  100 %    Resp Rate (set):  10          Electronically Signed By: Rick Myles MD  February 13, 2021  3:12 PM

## 2021-02-13 NOTE — PLAN OF CARE
Called to the ED for fetal heart tone assessment by doppler.  19 6/7 weeks AOG , with complaints of periumbilical pain radiating to the epigastric area.  at LLQ. Denies labor contractions, vaginal bleeding or leaking of fluid. ED RN updated.

## 2021-02-13 NOTE — LETTER
Date:  February 14, 2021    Re:  Kelsea Adams    To whom it may concern,    Kelsea recently had surgery with us.  As such, she has activity restriction for several weeks, and will be recovering at home.  Family may need to take time at home to care for her and any children over the next several days.    Sincerely,

## 2021-02-13 NOTE — CONSULTS
[facility]  Consult Note - Hospitalist Service     Date of Admission:  2/13/2021  Consult Requested by: Dr. Fatima  Reason for Consult: Gestational Diabetes management following elective lap cholecystectomy  PRIMARY CARE PROVIDER:    No Ref-Primary, Physician    Assessment & Plan   Kelsea Adams is a 35 year old female admitted on 2/13/2021.    Past medical history significant for currently 20 weeks pregnant, gestational diabetes, history of pregnancy induced HTN, History of MDD who was recently diagnosed with acute cholecystitis and underwent an elective lap cholecystectomy.      Acute cholecystitis s/p elective lap cholecystectomy  POD#0.   - Management per General Surgery.     --Defer analgesic management, DVT prophylaxis and PT/OT assessment.    - Clear liquid diet and advance as tolerated.    - HGB in the morning.    - Encourage IS.      Gestational Diabetes  Currently taking 15 units Humulin at bedtime.  Goal blood glucose in the morning is to stay below 95 per patient.    - Clear liquid diet and advance as tolerated.    - Decrease Humulin to 10 units at bedtime.  - Medium intensity sliding scale insulin.  - Blood glucose checks QID.    - Hypoglycemic protocol in place.      20 weeks pregnant  - Management per OB.      History of pregnancy induced HTN  Not currently on any medications.    - Monitor.      History of MDD  Not currently on any medications.      Diet: Advance Diet as Tolerated: Clear Liquid Diet     DVT Prophylaxis: Pneumatic Compression Devices and Defer to primary service   Stinson Catheter: not present  Code Status: Full Code     Disposition Plan    Expected discharge: Per General Surgery.    Entered: Adrian Tay PA-C 02/13/2021, 6:10 PM        The patient's care was discussed with the Bedside Nurse and Patient.    The patient has been discussed with Dr. Carter, who agrees with the assessment and plan at this time. Dr. Carter will evaluate the patient  independently.     At this time, I'd like to thank Dr. Fatima for consulting the Hospitalist service.  We will continue to follow.        Adrian Tay PA-C  [facility]    ______________________________________________________________________    Chief Complaint   Elective lap cholecystectomy in the setting of acute cholecystitis.      History is obtained from the patient and EMR.      History of Present Illness   Kelsea Adams is a 35 year old female with a past medical history significant for currently 20 weeks pregnant, gestational diabetes, history of pregnancy induced HTN, History of MDD who was recently diagnosed with acute cholecystitis and underwent an elective lap cholecystectomy.      Patient developed abdominal pain earlier this week (Tuesday) that was constant and remained above the navel.  She thought it was gas pain at first.  She indicated it was worse at night.  Since the pain continued to underwent a work-up and was diagnosed with acute cholecystitits.  Her OB reviewed and worked with General surgery and she underwent an elective lap cholecystectomy earlier today.  This was performed under general anesthesia with an EBL of 20 ml.      Patient was seen in her hospital room this afternoon/evening.  Reviewed development and work-up of abdominal pain.  She is currently feeling nauseated and had an episode of vomiting once this week.  She has ongoing issues with constipation.  She otherwise had no other concerns.  We reviewed her gestational diabetes management and she indicated she is on insulin 15 units at bedtime with goal morning glucose less than 95.      Review of Systems   The 10 point Review of Systems is negative other than noted in the HPI.    Past Medical History    I have reviewed this patient's medical history and updated it with pertinent information if needed.   Past Medical History:   Diagnosis Date     Depressive disorder      Diabetes (H)      Gestational     PONV (postoperative nausea and vomiting)    20 weeks pregnant, gestational diabetes, history of pregnancy induced HTN, History of MDD, acute cholecystitis    Past Surgical History   I have reviewed this patient's surgical history and updated it with pertinent information if needed.  Past Surgical History:   Procedure Laterality Date      SECTION  2016   , POD#0 for lap bradley    Social History   I have reviewed this patient's social history and updated it with pertinent information if needed.  Patient resides in an apartment with her  and daughter.  She quit smoking about 20 weeks ago.  She was previously consuming alcohol once in awhile, but none since pregnancy.  She does not use illicit drugs.    Social History     Tobacco Use     Smoking status: Former Smoker     Smokeless tobacco: Never Used   Substance Use Topics     Alcohol use: Not Currently     Drug use: Never       Family History   I have reviewed this patient's family history and updated it with pertinent information if needed.   Family History   Problem Relation Age of Onset     Hypertension Mother      Diabetes Mother      Hypertension Father      Diabetes Father      No Known Problems Sister      No Known Problems Brother      No Known Problems Sister    Parents both have DM and HTN.      Medications   Prior to Admission Medications   Prescriptions Last Dose Informant Patient Reported? Taking?   ACCU-CHEK GUIDE test strip Past Week at Unknown time  No Yes   Sig: Use to test blood sugar 4 times daily or as directed.   Insulin Pen Needle (PEN NEEDLES) 32G X 4 MM MISC 2021 at Unknown time  No Yes   Si each daily   Prenatal Vit-Fe Fumarate-FA (PRENATAL MULTIVITAMIN W/IRON) 27-0.8 MG tablet Past Week at Unknown time  Yes Yes   Sig: Take 1 tablet by mouth daily   acetone urine (KETOSTIX) test strip Past Week at Unknown time  No Yes   Sig: Use 1 strip/day with first morning urine until ketones are negative for 7  days in a row, then use 1 strip/week   aspirin (ASA) 81 MG chewable tablet Past Week at Unknown time  No Yes   Sig: Take 1 tablet (81 mg) by mouth daily   blood glucose monitoring (SOFTCLIX) lancets Past Week at Unknown time  No Yes   Sig: Use to test blood sugar 4 times daily.   insulin isophane human (HUMULIN N PEN) 100 UNIT/ML injection 2/12/2021 at 11:45 PM  No No   Sig: 15 units before bed + 2 unit prime = 17 units TDD (Expiration Date: 12/31/21 RXBIN # - [id number]      N # - [id number]      Group # [id number])   ondansetron (ZOFRAN-ODT) 4 MG ODT tab More than a month at Unknown time  No No   Sig: Take 1 tablet (4 mg) by mouth every 8 hours as needed for nausea      Facility-Administered Medications: None     Allergies   No Known Allergies    Physical Exam   Vital Signs: Temp: 98.1  F (36.7  C) Temp src: Oral BP: 134/81 Pulse: 77   Resp: 21 SpO2: 100 % O2 Device: Nasal cannula Oxygen Delivery: 1 LPM  Weight: 160 lbs 0 oz    Constitutional: Awake, alert, cooperative, no apparent distress.  Patient is groggy.    ENT: Normocephalic, without obvious abnormality, atraumatic, oral pharynx with moist mucus membranes, tonsils without erythema or exudates.  Eyes pupils are equal, round and reactive to light; extra occular movements intact.  Normal sclera.    Neck: Supple, symmetrical, trachea midline, no adenopathy.  Pulmonary: No increased work of breathing, good air exchange, clear to auscultation bilaterally, no crackles or wheezing.  Cardiovascular: Regular rate and rhythm, normal S1 and S2, no S3 or S4, and no murmur noted.  GI: Hypoactive bowel sounds, soft, non-distended, non-tender.    Skin/Integumen: Clear.  Neuro: CN II-XII grossly intact.  Upper and lower extremities strength, coordination and sensation intact bilaterally.    Psych:  Alert and oriented x 3. Normal affect.  Extremities: No lower extremity edema noted, and calves are non-tender to palpation bilaterally.      Data   I personally reviewed no  images or EKG's today.  Results for orders placed or performed during the hospital encounter of 02/13/21 (from the past 24 hour(s))   UA with Microscopic   Result Value Ref Range    Color Urine Light Yellow     Appearance Urine Slightly Cloudy     Glucose Urine Negative NEG^Negative mg/dL    Bilirubin Urine Negative NEG^Negative    Ketones Urine Negative NEG^Negative mg/dL    Specific Gravity Urine 1.014 1.003 - 1.035    Blood Urine Negative NEG^Negative    pH Urine 8.0 (H) 5.0 - 7.0 pH    Protein Albumin Urine Negative NEG^Negative mg/dL    Urobilinogen mg/dL 0.0 0.0 - 2.0 mg/dL    Nitrite Urine Negative NEG^Negative    Leukocyte Esterase Urine Negative NEG^Negative    Source Midstream Urine     WBC Urine 1 0 - 5 /HPF    RBC Urine 0 0 - 2 /HPF    Squamous Epithelial /HPF Urine 2 (H) 0 - 1 /HPF    Mucous Urine Present (A) NEG^Negative /LPF    Amorphous Crystals Few (A) NEG^Negative /HPF   CBC with platelets differential   Result Value Ref Range    WBC 12.5 (H) 4.0 - 11.0 10e9/L    RBC Count 5.13 3.8 - 5.2 10e12/L    Hemoglobin 13.2 11.7 - 15.7 g/dL    Hematocrit 40.6 35.0 - 47.0 %    MCV 79 78 - 100 fl    MCH 25.7 (L) 26.5 - 33.0 pg    MCHC 32.5 31.5 - 36.5 g/dL    RDW 13.2 10.0 - 15.0 %    Platelet Count 300 150 - 450 10e9/L    Diff Method Automated Method     % Neutrophils 70.4 %    % Lymphocytes 20.7 %    % Monocytes 6.8 %    % Eosinophils 0.9 %    % Basophils 0.6 %    % Immature Granulocytes 0.6 %    Nucleated RBCs 0 0 /100    Absolute Neutrophil 8.8 (H) 1.6 - 8.3 10e9/L    Absolute Lymphocytes 2.6 0.8 - 5.3 10e9/L    Absolute Monocytes 0.9 0.0 - 1.3 10e9/L    Absolute Eosinophils 0.1 0.0 - 0.7 10e9/L    Absolute Basophils 0.1 0.0 - 0.2 10e9/L    Abs Immature Granulocytes 0.1 0 - 0.4 10e9/L    Absolute Nucleated RBC 0.0    Comprehensive metabolic panel   Result Value Ref Range    Sodium 137 133 - 144 mmol/L    Potassium 4.1 3.4 - 5.3 mmol/L    Chloride 106 94 - 109 mmol/L    Carbon Dioxide 25 20 - 32 mmol/L     Anion Gap 6 3 - 14 mmol/L    Glucose 139 (H) 70 - 99 mg/dL    Urea Nitrogen 6 (L) 7 - 30 mg/dL    Creatinine 0.53 0.52 - 1.04 mg/dL    GFR Estimate >90 >60 mL/min/[1.73_m2]    GFR Estimate If Black >90 >60 mL/min/[1.73_m2]    Calcium 9.4 8.5 - 10.1 mg/dL    Bilirubin Total 0.3 0.2 - 1.3 mg/dL    Albumin 3.3 (L) 3.4 - 5.0 g/dL    Protein Total 7.7 6.8 - 8.8 g/dL    Alkaline Phosphatase 95 40 - 150 U/L    ALT 18 0 - 50 U/L    AST 7 0 - 45 U/L   Troponin I   Result Value Ref Range    Troponin I ES <0.015 0.000 - 0.045 ug/L   Hemoglobin A1c   Result Value Ref Range    Hemoglobin A1C 5.5 0 - 5.6 %   EKG 12-lead, tracing only   Result Value Ref Range    Interpretation ECG Click View Image link to view waveform and result    US Abdomen Limited    Narrative    ULTRASOUND ABDOMEN LIMITED February 13, 2021 7:46 AM    CLINICAL HISTORY: Right upper quadrant pain. Gallstones, pregnant and  with right upper quadrant pain.    TECHNIQUE: Limited abdominal ultrasound.    COMPARISON: None.    FINDINGS:    GALLBLADDER: Shadowing stones and sludge are present within the  gallbladder. Gallbladder wall is thickened measuring 5 mm. There is no  tenderness with direct transducer pressure over the gallbladder.    BILE DUCTS: There is no biliary dilatation. The common duct measures 3  mm.    LIVER: Normal where seen.    RIGHT KIDNEY: No hydronephrosis.    PANCREAS: The visualized portions of the pancreas are normal.    No ascites.      Impression    IMPRESSION: Gallstones and thickened gallbladder wall suggestive of  cholecystitis but there is no tenderness with direct transducer  pressure over the gallbladder, pericholecystic fluid, or significant  gallbladder distention and findings are ultimately equivocal.  Differential diagnosis of gallbladder wall thickening includes  hepatocellular dysfunction, chronic cholecystitis, and  adenomyomatosis.    ETHAN LANGE MD   Lactic acid whole blood   Result Value Ref Range    Lactic Acid 1.5 0.7  - 2.0 mmol/L   Blood culture    Specimen: Blood    Right Arm   Result Value Ref Range    Specimen Description Blood Right Arm     Culture Micro No growth after 2 hours    Blood culture    Specimen: Blood    Right Arm   Result Value Ref Range    Specimen Description Blood Right Arm     Culture Micro No growth after 2 hours    Asymptomatic SARS-CoV-2 COVID-19 Virus (Coronavirus) by PCR    Specimen: Nasopharyngeal   Result Value Ref Range    SARS-CoV-2 Virus Specimen Source Nasopharyngeal     SARS-CoV-2 PCR Result NEGATIVE     SARS-CoV-2 PCR Comment (Note)    Glucose by meter   Result Value Ref Range    Glucose 83 70 - 99 mg/dL   Glucose by meter   Result Value Ref Range    Glucose 117 (H) 70 - 99 mg/dL   Glucose by meter   Result Value Ref Range    Glucose 122 (H) 70 - 99 mg/dL   Glucose by meter   Result Value Ref Range    Glucose 109 (H) 70 - 99 mg/dL   Glucose by meter   Result Value Ref Range    Glucose 113 (H) 70 - 99 mg/dL

## 2021-02-13 NOTE — BRIEF OP NOTE
[facility]    Brief Operative Note    Pre-operative diagnosis: Cholecystitis [K81.9]  Post-operative diagnosis Same as pre-operative diagnosis    Procedure: Procedure(s):  CHOLECYSTECTOMY, LAPAROSCOPIC  Surgeon: Surgeon(s) and Role:     * David Fatima MD - Primary     * Ludmila Douglass MD - Resident - Assisting  Anesthesia: General   Estimated blood loss: Minimal  Drains: None  Specimens:   ID Type Source Tests Collected by Time Destination   A : gallbladder and contents Tissue Gallbladder and Contents SURGICAL PATHOLOGY EXAM David Fatima MD 2/13/2021 12:43 PM      Findings:   Contracted, inflamed gallbladder, removed in standard fashion.  Complications: None.  Implants: * No implants in log *

## 2021-02-13 NOTE — ANESTHESIA CARE TRANSFER NOTE
Patient: Kelsea Adams    Procedure(s):  CHOLECYSTECTOMY, LAPAROSCOPIC    Diagnosis: Cholecystitis [K81.9]  Diagnosis Additional Information: No value filed.    Anesthesia Type:   General     Note:    Oropharynx: oropharynx clear of all foreign objects  Level of Consciousness: awake  Oxygen Supplementation: face mask  Level of Supplemental Oxygen (L/min / FiO2): 8  Independent Airway: airway patency satisfactory and stable  Dentition: dentition unchanged  Vital Signs Stable: post-procedure vital signs reviewed and stable  Report to RN Given: handoff report given  Patient transferred to: PACU    Handoff Report: Identifed the Patient, Identified the Reponsible Provider, Reviewed the pertinent medical history, Discussed the surgical course, Reviewed Intra-OP anesthesia mangement and issues during anesthesia, Set expectations for post-procedure period and Allowed opportunity for questions and acknowledgement of understanding      Vitals: (Last set prior to Anesthesia Care Transfer)  CRNA VITALS  2/13/2021 1314 - 2/13/2021 1350      2/13/2021             Pulse:  104    SpO2:  100 %    Resp Rate (set):  10        Electronically Signed By: WILFRED Kumar CRNA  February 13, 2021  1:50 PM

## 2021-02-13 NOTE — ED PROVIDER NOTES
History     Chief Complaint:  Abdominal Pain    HPI  Kelsea Adams is a 35 year old  female at 20 weeks gestation with a history of gestational diabetes who presents for evaluation of epigastric and RUQ abdominal pain. She reports 4 days of this epigastric and RUQ pain for which she has already tried taking Miralax and Gas-X but without any relief. Her pain is present constantly and will wax and rupali during the day, often worse at night. She feels unable to take a full breath in due to worsening pain with deep inhalation. She denies any fever, cough, or chest pain. She also denies any dysuria or vaginal bleeding. L&D was here to see her already and found good fetal heart tones.       Review of Systems   Constitutional: Negative for fever.   Respiratory: Negative for cough.    Cardiovascular: Negative for chest pain.   Gastrointestinal: Positive for abdominal pain.   Genitourinary: Negative for dysuria and vaginal bleeding.   All other systems reviewed and are negative.        Allergies:  The patient has no known allergies.     Medications:    Aspirin  Humuiln insulin  Novalin insulin  Zofran     Past Medical History:    Gestational diabetes  Heart palpitations  IUGR  UTI    Past Surgical History:     section     Family History:    Diabetes in her father and mother  Hypertension in her father and mother    Social History:  The patient arrives alone  Has one child      Physical Exam     Patient Vitals for the past 24 hrs:   BP Temp Temp src Pulse Resp SpO2 Height Weight   21 1000 (!) 137/92 -- -- 95 15 99 % -- --   21 0900 (!) 168/100 -- -- 82 14 -- -- --   21 0800 114/79 -- -- 72 10 100 % -- --   21 0700 139/84 -- -- 84 19 99 % -- --   21 0645 134/84 -- -- 70 18 99 % -- --   21 0630 (!) 144/114 -- -- 79 19 99 % -- --   21 0615 (!) 147/100 -- -- 78 20 100 % -- --   21 0600 (!) 144/87 -- -- 80 18 99 % -- --   21 0527 (!) 172/89 98  F (36.7  C) Oral  "98 20 100 % 1.575 m (5' 2\") 72.6 kg (160 lb)     Physical Exam  Vitals: reviewed by me  General: Pt seen on hospital [facility], cooperative, and alert to conversation  Eyes: Tracking well, clear conjunctiva BL  ENT: MMM, midline trachea.   Lungs:  No tachypnea, no accessory muscle use. No respiratory distress.   CV: Rate as above, regular rhythm.    Abd: Soft, does have right upper quadrant tenderness to palpation, no guarding, no rebound.  Negative Man sign.  MSK: no peripheral edema or joint effusion.  No evidence of trauma  Skin: No rash, normal turgor and temperature  Neuro: Clear speech and no facial droop.  Psych: Not RIS, no e/o AH/VH      Emergency Department Course     ECG  ECG taken at 0606, ECG read at 0610   Normal sinus rhythm  Normal EKG  Rate 77 bpm. WA interval 120 ms. QRS duration 76 ms. QT/QTc 368/416 ms. P-R-T axes 66 45 43.     Imaging:  Radiology findings were communicated with the patient who voiced understanding of the findings.    US Abdomen limited RUQ  IMPRESSION: Gallstones and thickened gallbladder wall suggestive of   cholecystitis but there is no tenderness with direct transducer   pressure over the gallbladder, pericholecystic fluid, or significant   gallbladder distention and findings are ultimately equivocal.   Differential diagnosis of gallbladder wall thickening includes   hepatocellular dysfunction, chronic cholecystitis, and   adenomyomatosis  Reading per radiology    Laboratory:  Laboratory findings were communicated with the patient who voiced understanding of the findings.    CBC: (WBC 12.5 (H), HGB 13.2, )   CMP: Glucose 139 (H), Anion Gap: 6 (L), Albumin: 3.3 (L0, o/w WNL (Creatinine: 0.53)    Troponin (0553): <0.015  Lactic acid (0754): 1.5   Blood cultures in progress    UA: pH: 8.0 (H), Squamous Epithelial: 2 (H), Mucous: Present, Amorphous Crystals: few, o/w Negative    Asymptomatic COVID-19 PCR Nasopharyngeal swab in negative      Emergency Department " Course:    Reviewed:  I reviewed nursing notes, vitals, past medical history and care everywhere    Assessments:  0607 I obtained history and examined the patient as noted above.   0810 I rechecked the patient and explained findings.   0905 I rechecked the patient and explained plan for surgery    Consults:   0835 I spoke with OB regarding this patient.   0843: I spoke with Dr. Fatima of general surgery regarding this patient.    Interventions:  0616 Zofran 4mg IV injection   0617 Morphine, 4 mg, IV injection  0617 NS 1L IV Bolus  0652 GI Cocktail - Maalox 15 mL, Viscous Lidocaine 15 mL, 30 mL suspension PO  1031 Zosyn infusion 3.375 g IV    Disposition:  The patient was admitted to the OR under the care of Dr. Fatima.     Impression & Plan      Medical Decision Making:  Kelsea Adams is a 35 year old female who presents to the emergency department today for evaluation of epigastric pain for several days in the setting of second trimester pregnancy. Her labs are unremarkable, though she does have some RUQ tenderness, which prompted an ultrasound. The ultrasound shows what looks to be cholecystis. I spoke with Dr. Fatima of surgery, who agreed and offered to remove the patient's gallbladder. Patient was also given antibiotics, will be transferred from the ER to the OR under the care of Dr. Fatima.       Covid-19  Kelsea Adams was evaluated during a global COVID-19 pandemic, which necessitated consideration that the patient might be at risk for infection with the SARS-CoV-2 virus that causes COVID-19.   Applicable protocols for evaluation were followed during the patient's care.   COVID-19 was considered as part of the patient's evaluation. The plan for testing is:  a test was obtained during this visit.    Diagnosis:    ICD-10-CM    1. Cholecystitis  K81.9 Case Request: CHOLECYSTECTOMY, LAPAROSCOPIC     Case Request: CHOLECYSTECTOMY, LAPAROSCOPIC     Laparoscopic cholecystectomy     Laparoscopic cholecystectomy    2. 20 weeks gestation of pregnancy  Z3A.20    3. Cholecystitis  K81.9 Case Request: CHOLECYSTECTOMY, LAPAROSCOPIC     Case Request: CHOLECYSTECTOMY, LAPAROSCOPIC     Laparoscopic cholecystectomy     Laparoscopic cholecystectomy    Added automatically from request for surgery [id number]       Scribe Disclosure:  I, Elsy Etienne, am serving as a scribe at 6:07 AM on 2/13/2021 to document services personally performed by David Cedeno MD based on my observations and the provider's statements to me.    [facility] EMERGENCY DEPT     David Cedeno MD  02/13/21 [id number]

## 2021-02-13 NOTE — ED NOTES
Pt is about 20 weeks pregnant, has had 3 days of abdominal pain but tonight it increased to severe epigastric pain rated 10/10 radiating into the chest, with associated shortness of breath. Saw OBGYN yesterday for same complaint, who confirmed baby and mother looked healthy, US done, was told she had gas pains. Pt went home and took GasX and Miralax along with Tylenol with no relief.

## 2021-02-14 VITALS
SYSTOLIC BLOOD PRESSURE: 121 MMHG | HEART RATE: 78 BPM | WEIGHT: 160 LBS | DIASTOLIC BLOOD PRESSURE: 71 MMHG | HEIGHT: 62 IN | OXYGEN SATURATION: 97 % | TEMPERATURE: 97.8 F | BODY MASS INDEX: 29.44 KG/M2 | RESPIRATION RATE: 18 BRPM

## 2021-02-14 LAB
GLUCOSE BLDC GLUCOMTR-MCNC: 127 MG/DL (ref 70–99)
GLUCOSE BLDC GLUCOMTR-MCNC: 97 MG/DL (ref 70–99)
HGB BLD-MCNC: 11.6 G/DL (ref 11.7–15.7)

## 2021-02-14 PROCEDURE — 999N001017 HC STATISTIC GLUCOSE BY METER IP

## 2021-02-14 PROCEDURE — 258N000003 HC RX IP 258 OP 636: Performed by: SURGERY

## 2021-02-14 PROCEDURE — 99231 SBSQ HOSP IP/OBS SF/LOW 25: CPT | Performed by: INTERNAL MEDICINE

## 2021-02-14 PROCEDURE — 36415 COLL VENOUS BLD VENIPUNCTURE: CPT | Performed by: PHYSICIAN ASSISTANT

## 2021-02-14 PROCEDURE — 85018 HEMOGLOBIN: CPT | Performed by: PHYSICIAN ASSISTANT

## 2021-02-14 PROCEDURE — 250N000013 HC RX MED GY IP 250 OP 250 PS 637: Performed by: SURGERY

## 2021-02-14 RX ORDER — AMOXICILLIN 250 MG
2 CAPSULE ORAL 2 TIMES DAILY
Qty: 20 TABLET | Refills: 0 | Status: SHIPPED | OUTPATIENT
Start: 2021-02-14 | End: 2021-03-17

## 2021-02-14 RX ORDER — OXYCODONE HYDROCHLORIDE 5 MG/1
5-10 TABLET ORAL
Qty: 15 TABLET | Refills: 0 | Status: SHIPPED | OUTPATIENT
Start: 2021-02-14 | End: 2021-04-15

## 2021-02-14 RX ADMIN — ACETAMINOPHEN 975 MG: 325 TABLET, FILM COATED ORAL at 08:19

## 2021-02-14 RX ADMIN — DOCUSATE SODIUM 50 MG AND SENNOSIDES 8.6 MG 1 TABLET: 8.6; 5 TABLET, FILM COATED ORAL at 08:19

## 2021-02-14 RX ADMIN — OXYCODONE HYDROCHLORIDE 5 MG: 5 TABLET ORAL at 08:19

## 2021-02-14 RX ADMIN — OXYCODONE HYDROCHLORIDE 5 MG: 5 TABLET ORAL at 14:58

## 2021-02-14 RX ADMIN — OXYCODONE HYDROCHLORIDE 5 MG: 5 TABLET ORAL at 11:21

## 2021-02-14 RX ADMIN — SODIUM CHLORIDE: 9 INJECTION, SOLUTION INTRAVENOUS at 02:04

## 2021-02-14 RX ADMIN — ACETAMINOPHEN 975 MG: 325 TABLET, FILM COATED ORAL at 00:02

## 2021-02-14 RX ADMIN — OXYCODONE HYDROCHLORIDE 5 MG: 5 TABLET ORAL at 04:41

## 2021-02-14 RX ADMIN — ASPIRIN 81 MG CHEWABLE TABLET 81 MG: 81 TABLET CHEWABLE at 08:19

## 2021-02-14 ASSESSMENT — ACTIVITIES OF DAILY LIVING (ADL)
ADLS_ACUITY_SCORE: 14

## 2021-02-14 NOTE — DISCHARGE SUMMARY
[facility] Discharge Summary    Kelsea Adams MRN# [id number]   Age: 35 year old YOB: 1985     Date of Admission:  2/13/2021  Date of Discharge::  2/14/2021  Admitting Physician:  David Fatima MD  Discharge Physician:  Ludmila Douglass MD           Admission Diagnoses:   Cholecystitis [K81.9]  20 weeks gestation of pregnancy [Z3A.20]          Discharge Diagnosis:   Cholecystitis [K81.9]          Procedures:   Procedure(s): Cholecystectomy, laproscopic            Medications Prior to Admission:     Medications Prior to Admission   Medication Sig Dispense Refill Last Dose     ACCU-CHEK GUIDE test strip Use to test blood sugar 4 times daily or as directed. 200 strip 4 Past Week at Unknown time     acetone urine (KETOSTIX) test strip Use 1 strip/day with first morning urine until ketones are negative for 7 days in a row, then use 1 strip/week 100 strip 1 Past Week at Unknown time     aspirin (ASA) 81 MG chewable tablet Take 1 tablet (81 mg) by mouth daily 60 tablet 1 Past Week at Unknown time     blood glucose monitoring (SOFTCLIX) lancets Use to test blood sugar 4 times daily. 100 each 3 Past Week at Unknown time     Insulin Pen Needle (PEN NEEDLES) 32G X 4 MM MISC 1 each daily 100 each 3 2/12/2021 at Unknown time     Prenatal Vit-Fe Fumarate-FA (PRENATAL MULTIVITAMIN W/IRON) 27-0.8 MG tablet Take 1 tablet by mouth daily   Past Week at Unknown time     insulin isophane human (HUMULIN N PEN) 100 UNIT/ML injection 15 units before bed + 2 unit prime = 17 units TDD (Expiration Date: 12/31/21 RXBIN # - [id number]      N # - [id number]      Group # [id number]) 15 mL 3 2/12/2021 at 11:45 PM     ondansetron (ZOFRAN-ODT) 4 MG ODT tab Take 1 tablet (4 mg) by mouth every 8 hours as needed for nausea 30 tablet 0 More than a month at Unknown time             Discharge Medications:     START taking these medications    Details   oxyCODONE (ROXICODONE) 5 MG tablet Take 1-2 tablets (5-10 mg)  by mouth every 3 hours as needed  Qty: 15 tablet, Refills: 0    Associated Diagnoses: Cholecystitis; 20 weeks gestation of pregnancy      senna-docusate (SENOKOT-S/PERICOLACE) 8.6-50 MG tablet Take 2 tablets by mouth 2 times daily  Qty: 20 tablet, Refills: 0    Associated Diagnoses: Cholecystitis; 20 weeks gestation of pregnancy         CONTINUE these medications which have NOT CHANGED    Details   ACCU-CHEK GUIDE test strip Use to test blood sugar 4 times daily or as directed.  Qty: 200 strip, Refills: 4    Associated Diagnoses: Abnormal glucose tolerance test; Prenatal care, subsequent pregnancy in first trimester      acetone urine (KETOSTIX) test strip Use 1 strip/day with first morning urine until ketones are negative for 7 days in a row, then use 1 strip/week  Qty: 100 strip, Refills: 1    Associated Diagnoses: Abnormal glucose tolerance test; Prenatal care, subsequent pregnancy in first trimester      aspirin (ASA) 81 MG chewable tablet Take 1 tablet (81 mg) by mouth daily  Qty: 60 tablet, Refills: 1    Associated Diagnoses: History of pre-eclampsia      blood glucose monitoring (SOFTCLIX) lancets Use to test blood sugar 4 times daily.  Qty: 100 each, Refills: 3    Associated Diagnoses: Abnormal glucose tolerance test; Prenatal care, subsequent pregnancy in first trimester      Insulin Pen Needle (PEN NEEDLES) 32G X 4 MM MISC 1 each daily  Qty: 100 each, Refills: 3    Associated Diagnoses: Abnormal glucose tolerance test; Prenatal care, subsequent pregnancy in first trimester      Prenatal Vit-Fe Fumarate-FA (PRENATAL MULTIVITAMIN W/IRON) 27-0.8 MG tablet Take 1 tablet by mouth daily      insulin isophane human (HUMULIN N PEN) 100 UNIT/ML injection 15 units before bed + 2 unit prime = 17 units TDD (Expiration Date: 12/31/21 RXBIN # - [id number]      N # - [id number]      Group # [id number])  Qty: 15 mL, Refills: 3    Associated Diagnoses: Pre-existing diabetes mellitus affecting pregnancy in second trimester,  antepartum      ondansetron (ZOFRAN-ODT) 4 MG ODT tab Take 1 tablet (4 mg) by mouth every 8 hours as needed for nausea  Qty: 30 tablet, Refills: 0    Associated Diagnoses: Nausea and vomiting in pregnancy             Consultations:   Consultation during this admission received from internal medicine and nutrition for gestational diabetes.          Brief History of Illness:   Kelsea Adams is a 35 year old  female at 20 weeks gestation with a history of gestational diabetes who presents for evaluation of epigastric and RUQ abdominal pain. She reports 4 days of this epigastric and RUQ pain for which she has already tried taking Miralax and Gas-X but without any relief. Her pain is present constantly and will wax and wane during the day, often worse at night. She feels unable to take a full breath in due to worsening pain with deep inhalation. She denies any fever, cough, or chest pain. She also denies any dysuria or vaginal bleeding. L&D was here to see her already and found good fetal heart tones. Labs and ultrasound seem consistent with acute cholecystitis.      Cholecystectomy during the second trimester is associated with greater success and lower likelihood of conversion to open surgery.  The risks of non-operative management versus operative management were explained to the patient including the risk of fetal complications or loss.  Patient understands these risks and provided informed consent.             Hospital Course:   She underwent an uneventful laparoscopic cholecystectomy.  The hospital course was unremarkable.  She recovered as anticipated and experienced no post-operative complications.  She tolerated a regular diet and pain was controlled with oral medications only.  We discussed the importance of bowel meds and avoiding constipation, given pregnancy and narcotic use.  Fetal heart tones remained reassuring.            Discharge Instructions and Follow-Up:   Discharge diet: Regular - diabetic    Discharge activity: No heavy lifting for 6 week(s)   Discharge follow-up: Follow up with primary care provider in 7 days.  Surgery office will call for postop follow up over the phone.   Wound care: Keep wound clean and dry  Remove dressing in 2 days.  Ok to shower at that point.  Steri-strips off in 14 days           Discharge Disposition:   Discharged to home     Ludmila Douglass MD

## 2021-02-14 NOTE — PROGRESS NOTES
Patient requested Doppler to check on fetus. Doppler done on patient and FHR was 154. No decreases of  heartrate in the time that was auscultated.  Patient reassured by this writer.

## 2021-02-14 NOTE — PLAN OF CARE
Routine fht doppler done.  , strong and steady.  No decreases heard.  Pt hoping to go home today.

## 2021-02-14 NOTE — CONSULTS
Chart reviewed, received consult --> Provider Order for Nutrition Education --> Gestational diabetes    Reviewed chart, noted she is currently on a regular diet    She did meet with the CDE last month and the following plan was determined per CDE's note -->    PLAN:  Check glucose 4 times daily, before breakfast and 1 hour after each meal.      Check Ketones daily for one week, if negative, reduce testing to once a week.      Physical activity recommended: yes.     Meal plan: 2-3 carbs at breakfast, 3-4 carbs at lunch, 3-4 carbs at supper, 1-2 carbs at 3 snacks a day.  Follow consistent CHO meal plan, eat CHO and protein/fat at all meals/snacks.     Call/e-mail/MyChart message diabetes educator if 3 or more blood sugars are above the goal in 1 week, if ketones are positive, or with questions/concerns.     Meka Echevarria RD, LD, CDE  Time Spent: 50 minutes  Encounter Type: Individual     Any diabetes medication dose changes were made via the CDE Protocol and Collaborative Practice Agreement with the patient's referring provider. A copy of this encounter was shared with the provider.    Will check in with patient on 2/15 to see if she has any further questions.    Marlena Vargas RD, LD, [facility]   Clinical Dietitian - [facility]

## 2021-02-14 NOTE — PROGRESS NOTES
[facility]    Medicine Progress Note - Hospitalist Service       Date of Admission:  2/13/2021  Assessment & Plan       Kelsea Adams is a 35 year old female admitted on 2/13/2021.     Past medical history significant for currently 20 weeks pregnant, gestational diabetes, history of pregnancy induced HTN, History of MDD who was diagnosed with acute cholecystitis and underwent an elective lap cholecystectomy.       Acute cholecystitis s/p elective lap cholecystectomy on 2/13, POD#1  - Management per General Surgery.                      Gestational Diabetes  Currently taking 15 units Humulin at bedtime.  Goal blood glucose in the morning is to stay below 95 per patient.    - received HumulinN to 10 units at bedtime last evening, BG 97 this am  - increased HumulinN to PTA dose of 15 units qhs  - continue with medium intensity sliding scale insulin.  - Blood glucose checks QID.    - Hypoglycemic protocol in place.       20 weeks pregnant  - Management per OB, plan for fetal heart tone check prior to discharge     History of pregnancy induced HTN  Not currently on any medications.    - Monitor.       History of MDD  Not currently on any medications.            Diet: Advance Diet as Tolerated: Regular Diet Adult    DVT Prophylaxis: Pneumatic Compression Devices  Stinson Catheter: not present  Code Status: Full Code           Disposition Plan   Expected discharge: per primary service  Entered: Molina Aponte MD 02/14/2021, 9:12 AM       The patient's care was discussed with the Bedside Nurse and Patient.    Molina Aponte MD  Hospitalist Service  [facility]  Contact information available via [facility] Paging/Directory    ______________________________________________________________________    Interval History   Pain mainly around incision site.   Tolerated clear liquid diet, advancing to regular this morning.   Denies nausea or emesis.     Data reviewed today:  I reviewed all medications, new labs and imaging results over the last 24 hours. I personally reviewed no images or EKG's today.    Physical Exam   Vital Signs: Temp: 98.7  F (37.1  C) Temp src: Oral BP: (!) 143/83 Pulse: 72   Resp: 18 SpO2: 99 % O2 Device: None (Room air) Oxygen Delivery: 1 LPM  Weight: 160 lbs 0 oz  General Appearance: Alert, awake and no apparent distress  Respiratory: clear to ausculation bilaterally  Cardiovascular: regular rate and rhythm  GI: soft, gravid, tenderness around incision site  Skin: warm and dry      Data   Recent Labs   Lab 02/14/21  0700 02/13/21  0553   WBC  --  12.5*   HGB 11.6* 13.2   MCV  --  79   PLT  --  300   NA  --  137   POTASSIUM  --  4.1   CHLORIDE  --  106   CO2  --  25   BUN  --  6*   CR  --  0.53   ANIONGAP  --  6   ZABRINA  --  9.4   GLC  --  139*   ALBUMIN  --  3.3*   PROTTOTAL  --  7.7   BILITOTAL  --  0.3   ALKPHOS  --  95   ALT  --  18   AST  --  7   TROPI  --  <0.015     No results found for this or any previous visit (from the past 24 hour(s)).  Medications     sodium chloride 125 mL/hr at 02/14/21 0204       acetaminophen  975 mg Oral Q8H     aspirin  81 mg Oral Daily     insulin aspart  1-7 Units Subcutaneous 4x Daily AC & HS     insulin isophane human  15 Units Subcutaneous At Bedtime     senna-docusate  1 tablet Oral BID    Or     senna-docusate  2 tablet Oral BID

## 2021-02-14 NOTE — PLAN OF CARE
A&O x4, VSS, pain well managed with PRN oxycodone and scheduled Tylenol. Patient was concerned about not feeling her baby move, L&D nurse confirmed fetal heart tones. Lap sites x4, CDI, no hematoma. +void, -flatus, -BM. Tolerated full liquid diet well, no nausea,

## 2021-02-14 NOTE — DISCHARGE SUMMARY
Patient eligible for discharge. Patient education including medication administration, follow-up care, and necessary wound care completed. Patient/family verbalized understanding of education. All questions answered and concerns addressed. IV removed. All belongings sent home with patient/family. Patient vitally stable at time of discharge.

## 2021-02-14 NOTE — PROGRESS NOTES
"SPIRITUAL HEALTH SERVICES  SPIRITUAL ASSESSMENT Progress Note  [facility]     REFERRAL SOURCE: Admission Request    Reviewed documentation. Reflective conversation shared with Kelsea which integrated elements of illness and family narratives.     Kelsea shared that she is \"doing well\" and is looking forward to expected discharge this afternoon. She requested a prayer which I offered incorporating conversational themes.     I offered spiritual and emotional support through reflective listening that affirmed emotions, experience, and meaning.     PLAN: [facility] remains available for support.    Tamra Jarrell  Associate   Pager [phone]  Phone [phone]    "

## 2021-02-15 ENCOUNTER — VIRTUAL VISIT (OUTPATIENT)
Dept: EDUCATION SERVICES | Facility: CLINIC | Age: 36
End: 2021-02-15
Payer: COMMERCIAL

## 2021-02-15 DIAGNOSIS — O24.312 PRE-EXISTING DIABETES MELLITUS AFFECTING PREGNANCY IN SECOND TRIMESTER, ANTEPARTUM: Primary | ICD-10-CM

## 2021-02-15 NOTE — PROGRESS NOTES
Gestational Diabetes Follow-up    Subjective/Objective:    Kelsea Adams was called for a scheduled BG review. Last date of communication was: 2/4/2021.    Gestational diabetes is being managed with medications    Taking diabetes medications:   yes:     Diabetes Medication(s)     Insulin       insulin isophane human (HUMULIN N PEN) 100 UNIT/ML injection    15 units before bed + 2 unit prime = 17 units TDD (Expiration Date: 12/31/21 RXBIN # - [id number]      PCN # - [id number]      Group # [id number])          Estimated Date of Delivery: Jul 4, 2021    Blood Glucose/Ketone Log:    Date Ketones Fasting Post Breakfast Post Lunch Post Supper   2/9  87      2/10  103      2/11  73      2/12  82      2/13  hospital      2/14  hospital      2/15  Nothing yet        Had gall bladder surgery over the weekend, and prior to that she wasn't feeling well enough to eat    Assessment:    Fasting blood glucoses: 75% in target.      Plan/Response:  No changes in the patient's current treatment plan.  Follow-up in 1 week.    KEVIN Mix CDE  Time Spent: <5 minutes    Any diabetes medication dose changes were made via the CDE Protocol and Collaborative Practice Agreement with the patient's OB/GYN provider. A copy of this encounter was shared with the provider.

## 2021-02-16 ENCOUNTER — PRENATAL OFFICE VISIT (OUTPATIENT)
Dept: OBGYN | Facility: CLINIC | Age: 36
End: 2021-02-16
Payer: COMMERCIAL

## 2021-02-16 VITALS
WEIGHT: 156.5 LBS | SYSTOLIC BLOOD PRESSURE: 108 MMHG | HEART RATE: 83 BPM | BODY MASS INDEX: 28.62 KG/M2 | DIASTOLIC BLOOD PRESSURE: 72 MMHG

## 2021-02-16 DIAGNOSIS — Z98.891 HISTORY OF C-SECTION: ICD-10-CM

## 2021-02-16 DIAGNOSIS — Z34.82 PRENATAL CARE, SUBSEQUENT PREGNANCY IN SECOND TRIMESTER: Primary | ICD-10-CM

## 2021-02-16 DIAGNOSIS — O24.414 INSULIN CONTROLLED GESTATIONAL DIABETES MELLITUS (GDM) IN SECOND TRIMESTER: ICD-10-CM

## 2021-02-16 DIAGNOSIS — O09.299 HX OF PRE-ECLAMPSIA IN PRIOR PREGNANCY, CURRENTLY PREGNANT: ICD-10-CM

## 2021-02-16 LAB — COPATH REPORT: NORMAL

## 2021-02-16 PROCEDURE — 99207 PR PRENATAL VISIT: CPT | Performed by: OBSTETRICS & GYNECOLOGY

## 2021-02-16 NOTE — NURSING NOTE
"Chief Complaint   Patient presents with     Prenatal Care     20 weeks 2 days   MFM 2021 & 3/13/2021         Initial /72 (BP Location: Right arm, Cuff Size: Adult Regular)   Pulse 83   Wt 71 kg (156 lb 8 oz)   LMP 2020 (Exact Date)   Breastfeeding No   BMI 28.62 kg/m   Estimated body mass index is 28.62 kg/m  as calculated from the following:    Height as of 21: 1.575 m (5' 2\").    Weight as of this encounter: 71 kg (156 lb 8 oz).  BP completed using cuff size: regular    Questioned patient about current smoking habits.  Pt. has never smoked.    20w2d        The following HM Due: NONE      +FM daily   + Gall bladder surgery 2021       Saray Navarrete, CMA on 2021 at 2:32 PM        "

## 2021-02-19 LAB
BACTERIA SPEC CULT: NO GROWTH
BACTERIA SPEC CULT: NO GROWTH
SPECIMEN SOURCE: NORMAL
SPECIMEN SOURCE: NORMAL

## 2021-02-22 ENCOUNTER — VIRTUAL VISIT (OUTPATIENT)
Dept: EDUCATION SERVICES | Facility: CLINIC | Age: 36
End: 2021-02-22
Payer: COMMERCIAL

## 2021-02-22 DIAGNOSIS — O24.312 PRE-EXISTING DIABETES MELLITUS AFFECTING PREGNANCY IN SECOND TRIMESTER, ANTEPARTUM: Primary | ICD-10-CM

## 2021-02-22 PROCEDURE — 98966 PH1 ASSMT&MGMT NQHP 5-10: CPT | Mod: 95

## 2021-02-22 NOTE — PROGRESS NOTES
Doing well.   Recovering well from her laparoscopic cholecystectomy.   Denies VB, ctx, LOF. +FM  /72 (BP Location: Right arm, Cuff Size: Adult Regular)   Pulse 83   Wt 71 kg (156 lb 8 oz)   LMP 2020 (Exact Date)   Breastfeeding No   BMI 28.62 kg/m    General Appearance: NAD  Abdomen: Gravid, NT  Refer to flow sheet above.   A/P: 35 year old  at 20w2d  -- GDMA2: failed early GCT; encouraged to stay connected with diabetic educator/dieticians for insulin management; will plan  fetal ultrasound surveillance at 32 weeks; fetal echocardiogram upcoming  -- hx of Pre-E in previous pregnancy: normal baseline HELLP labs; on baby ASA  -- PTL precautions reviewed  RTC in 4 weeks    Gregoria Kaba MD  [facility]

## 2021-03-01 ENCOUNTER — TELEPHONE (OUTPATIENT)
Dept: SURGERY | Facility: CLINIC | Age: 36
End: 2021-03-01

## 2021-03-01 NOTE — TELEPHONE ENCOUNTER
"[facility]  Post op call note  March 1, 2021     Kelsea Adams was called for an update regarding her recovery.  The phone number listed for her did not ring on two attempts and her emergency contact also did not answer.  Neither answering machine identified these as the correct number and so no message was left.      Per chart she appears to be doing quite well on her prenatal visits, though at 2/22 visit was \"not eating much\".  She can call our office any time [phone] and ask to speak with our nurse if she has any questions or to provide an update.    Ludmila Douglass MD  [facility]  "

## 2021-03-05 ENCOUNTER — OFFICE VISIT (OUTPATIENT)
Dept: CARDIOLOGY | Facility: CLINIC | Age: 36
End: 2021-03-05
Payer: COMMERCIAL

## 2021-03-05 ENCOUNTER — HOSPITAL ENCOUNTER (OUTPATIENT)
Dept: CARDIOLOGY | Facility: CLINIC | Age: 36
Discharge: HOME | End: 2021-03-05
Attending: OBSTETRICS & GYNECOLOGY | Admitting: OBSTETRICS & GYNECOLOGY
Payer: COMMERCIAL

## 2021-03-05 DIAGNOSIS — E11.9 TYPE 2 DIABETES MELLITUS WITHOUT COMPLICATION, WITHOUT LONG-TERM CURRENT USE OF INSULIN (H): ICD-10-CM

## 2021-03-05 DIAGNOSIS — O35.BXX0 FETAL CARDIAC DISEASE AFFECTING PREGNANCY, SINGLE OR UNSPECIFIED FETUS: Primary | ICD-10-CM

## 2021-03-05 PROCEDURE — 93325 DOPPLER ECHO COLOR FLOW MAPG: CPT | Mod: 26 | Performed by: PEDIATRICS

## 2021-03-05 PROCEDURE — 76827 ECHO EXAM OF FETAL HEART: CPT | Mod: 26 | Performed by: PEDIATRICS

## 2021-03-05 PROCEDURE — 93325 DOPPLER ECHO COLOR FLOW MAPG: CPT

## 2021-03-05 PROCEDURE — 99202 OFFICE O/P NEW SF 15 MIN: CPT | Mod: 25 | Performed by: PEDIATRICS

## 2021-03-05 PROCEDURE — 76825 ECHO EXAM OF FETAL HEART: CPT | Mod: 26 | Performed by: PEDIATRICS

## 2021-03-05 NOTE — PROGRESS NOTES
[facility]   Heart Center Fetal Consult Note    Patient:  Kelsea Adams MRN:  [id number]   YOB: 1985 Age:  35 year old   Date of Visit:  3/5/2021 PCP:  No Ref-Primary, Physician     Dear Dr. Guillen,     I had the pleasure of seeing Kelsea Adams at the [facility] on 3/5/2021 in fetal cardiology consultation for fetal echocardiogram results. She presented today accompanied by herself. As you know, she is a 35 year old  at 22w5d who presented for fetal echocardiogram today because of pre-gestational diabetes mellitus type II.    I performed and interpreted the fetal echocardiogram today, which demonstrated likely normal fetal echocardiogram. Normal fetal intracardiac connections. Normal right and left ventricular size and function. No hydrops. Fetal heart rate is regular at 157 bpm.     I reviewed the echo findings today with Kelsea Adams. Although this was a technically difficult study, the patient is aware that no obvious cardiac abnormalities were identified. She is aware of the general limitations of fetal echocardiography. No additional fetal echocardiograms are recommended. No  cardiac follow-up is required.     Thank you for allowing me to participate in Kelsea's care. Please do not hesitate to contact me with questions or concerns.    This visit was separate from the performance and interpretation of the ultrasound. The majority of the time (>50%) was spent in counseling and coordination of care. I spent approximately 15 minutes in face-to-face time reviewing the above considerations.    Gavin Kim M.D.  Pediatric Cardiology  [address], 5th floor, [facility] [address]  Phone [phone]  Fax [phone]

## 2021-03-07 ENCOUNTER — HEALTH MAINTENANCE LETTER (OUTPATIENT)
Age: 36
End: 2021-03-07

## 2021-03-08 ENCOUNTER — TELEPHONE (OUTPATIENT)
Dept: OBGYN | Facility: CLINIC | Age: 36
End: 2021-03-08

## 2021-03-08 NOTE — TELEPHONE ENCOUNTER
Patient would like call back regarding glucose testing, and if she can skip 24 week visit in order to travel.

## 2021-03-12 ENCOUNTER — OFFICE VISIT (OUTPATIENT)
Dept: MATERNAL FETAL MEDICINE | Facility: CLINIC | Age: 36
End: 2021-03-12
Attending: OBSTETRICS & GYNECOLOGY
Payer: COMMERCIAL

## 2021-03-12 ENCOUNTER — HOSPITAL ENCOUNTER (OUTPATIENT)
Dept: ULTRASOUND IMAGING | Facility: CLINIC | Age: 36
End: 2021-03-12
Attending: OBSTETRICS & GYNECOLOGY
Payer: COMMERCIAL

## 2021-03-12 DIAGNOSIS — E11.9 TYPE 2 DIABETES MELLITUS WITHOUT COMPLICATION, WITHOUT LONG-TERM CURRENT USE OF INSULIN (H): ICD-10-CM

## 2021-03-12 DIAGNOSIS — O24.112 PRE-EXISTING TYPE 2 DIABETES MELLITUS DURING PREGNANCY IN SECOND TRIMESTER: Primary | ICD-10-CM

## 2021-03-12 PROCEDURE — 76816 OB US FOLLOW-UP PER FETUS: CPT

## 2021-03-12 PROCEDURE — 76816 OB US FOLLOW-UP PER FETUS: CPT | Mod: 26 | Performed by: OBSTETRICS & GYNECOLOGY

## 2021-03-12 NOTE — PROGRESS NOTES
Please see the imaging tab for details of the ultrasound performed today.    Martina Santana MD  Specialist in Maternal-Fetal Medicine

## 2021-03-17 ENCOUNTER — PRENATAL OFFICE VISIT (OUTPATIENT)
Dept: OBGYN | Facility: CLINIC | Age: 36
End: 2021-03-17
Payer: COMMERCIAL

## 2021-03-17 VITALS — BODY MASS INDEX: 29.26 KG/M2 | SYSTOLIC BLOOD PRESSURE: 124 MMHG | WEIGHT: 160 LBS | DIASTOLIC BLOOD PRESSURE: 70 MMHG

## 2021-03-17 DIAGNOSIS — Z87.59 HISTORY OF PRE-ECLAMPSIA: ICD-10-CM

## 2021-03-17 DIAGNOSIS — O24.414 WHITE CLASSIFICATION A2 GESTATIONAL DIABETES MELLITUS (GDM), INSULIN CONTROLLED: ICD-10-CM

## 2021-03-17 DIAGNOSIS — O09.899 SUPERVISION OF OTHER HIGH RISK PREGNANCY, ANTEPARTUM: ICD-10-CM

## 2021-03-17 DIAGNOSIS — O34.219 PREVIOUS CESAREAN DELIVERY, ANTEPARTUM CONDITION OR COMPLICATION: Primary | ICD-10-CM

## 2021-03-17 PROBLEM — Z3A.20 20 WEEKS GESTATION OF PREGNANCY: Status: RESOLVED | Noted: 2021-02-13 | Resolved: 2021-03-17

## 2021-03-17 PROBLEM — Z34.81 PRENATAL CARE, SUBSEQUENT PREGNANCY IN FIRST TRIMESTER: Status: RESOLVED | Noted: 2020-12-15 | Resolved: 2021-03-17

## 2021-03-17 PROCEDURE — 99207 PR COMPLICATED OB VISIT: CPT | Performed by: OBSTETRICS & GYNECOLOGY

## 2021-03-17 RX ORDER — AMOXICILLIN 250 MG
2 CAPSULE ORAL 2 TIMES DAILY
Qty: 20 TABLET | Refills: 0 | Status: SHIPPED | OUTPATIENT
Start: 2021-03-17 | End: 2021-09-30

## 2021-03-17 RX ORDER — ASPIRIN 81 MG/1
81 TABLET, CHEWABLE ORAL DAILY
Qty: 60 TABLET | Refills: 1 | Status: SHIPPED | OUTPATIENT
Start: 2021-03-17 | End: 2021-09-30

## 2021-03-17 NOTE — NURSING NOTE
"Chief Complaint   Patient presents with     Prenatal Care     24 weeks 3 days- concerns of left flank pain        Initial /70 (BP Location: Right arm, Patient Position: Sitting, Cuff Size: Adult Regular)   Wt 72.6 kg (160 lb)   LMP 2020 (Exact Date)   BMI 29.26 kg/m   Estimated body mass index is 29.26 kg/m  as calculated from the following:    Height as of 21: 1.575 m (5' 2\").    Weight as of this encounter: 72.6 kg (160 lb).  BP completed using cuff size: regular    Questioned patient about current smoking habits.  Pt. has never smoked.          The following HM Due: NONE    24w3d  Antony Jimenez CMA                "

## 2021-03-17 NOTE — PROGRESS NOTES
No c/o's.  On PNV, baby ASA.  Had MFM U/S showed EFW at 28% at 23 weeks.  FBS-80s, 1hr PP - 155.  Pt to call DM Ed re: her PP glucoses since they are running higher.  Had MFM U/S in 4 weeks for EFW.  28 week labs (except no 1hr glucose), TDaP at next visit.   labor/Premature rupture of membranes precautions reviewed.  RTC 4 weeks.    Encounter Diagnoses   Name Primary?     Previous  delivery, antepartum condition or complication Yes     White classification A2 gestational diabetes mellitus (GDM), insulin controlled      Supervision of other high risk pregnancy, antepartum      History of pre-eclampsia        Risk factors listed above are stable and being addressed as noted.    Ramírez Rodriguez MD  [facility] [place]

## 2021-03-18 ENCOUNTER — MYC MEDICAL ADVICE (OUTPATIENT)
Dept: EDUCATION SERVICES | Facility: CLINIC | Age: 36
End: 2021-03-18

## 2021-03-18 DIAGNOSIS — O24.414 WHITE CLASSIFICATION A2 GESTATIONAL DIABETES MELLITUS (GDM), INSULIN CONTROLLED: ICD-10-CM

## 2021-03-19 NOTE — TELEPHONE ENCOUNTER
"Gestational Diabetes Follow-up    Subjective/Objective:    Kelsea Adams sent in blood glucose log for review. Last date of communication was: 2-22-21.    Gestational diabetes is being managed with diet, activity and medications    Taking diabetes medications:   yes:     Diabetes Medication(s)     Insulin       insulin isophane human (HUMULIN N PEN) 100 UNIT/ML injection    15 units before bed + 2 unit prime = 17 units TDD (Expiration Date: 12/31/21 RXBIN # - [id number]      N # - [id number]      Group # [id number])          Estimated Date of Delivery: Jul 4, 2021    BG/Food Log:                       Assessment:    Ketones: Not noted.   Fasting blood glucoses: 100% in target.  After breakfast: 60% in target.  After lunch: 50% in target.  After dinner: 25% in target.    Pt message: \"Hi it's been a while I did not give an update... I was doing the fasting blood sugar alone until the doctor told me to continue doing the the testing after eating so I have some records.On the 17th the rest is blank because I was out all day so I did not get to test for the rest of meals.. and 19th still taking records and also still doing the same does of insulin you recommend.\"    Kelsea's fasting BG's look great! Looks like she could use some insulin to help with her daytime BG's. I am going to request she do some extra testing over the weekend so we can asses if she needs NPH in the am or meal time insulin.    Plan/Response:  Check BG 6 times daily (before and one hour after each meal).  Reminder to check ketones once a week.  Continue NPH 0-0-0-15  Follow-up on Tuesday.  MyChart response sent.    Christina Gautam RN, [facility]      Any diabetes medication dose changes were made via the CDE Protocol and Collaborative Practice Agreement with the patient's OB/GYN provider. A copy of this encounter was shared with the provider.      "

## 2021-03-23 ENCOUNTER — MYC MEDICAL ADVICE (OUTPATIENT)
Dept: EDUCATION SERVICES | Facility: CLINIC | Age: 36
End: 2021-03-23

## 2021-03-23 DIAGNOSIS — Z34.81 PRENATAL CARE, SUBSEQUENT PREGNANCY IN FIRST TRIMESTER: ICD-10-CM

## 2021-03-23 DIAGNOSIS — O24.312 PRE-EXISTING DIABETES MELLITUS AFFECTING PREGNANCY IN SECOND TRIMESTER, ANTEPARTUM: ICD-10-CM

## 2021-03-23 DIAGNOSIS — O24.414 WHITE CLASSIFICATION A2 GESTATIONAL DIABETES MELLITUS (GDM), INSULIN CONTROLLED: ICD-10-CM

## 2021-03-23 DIAGNOSIS — R73.09 ABNORMAL GLUCOSE TOLERANCE TEST: ICD-10-CM

## 2021-03-24 RX ORDER — BLOOD SUGAR DIAGNOSTIC
STRIP MISCELLANEOUS
Qty: 200 STRIP | Refills: 4 | Status: SHIPPED | OUTPATIENT
Start: 2021-03-24 | End: 2021-05-13

## 2021-03-24 NOTE — TELEPHONE ENCOUNTER
Gestational Diabetes Follow-up    Subjective/Objective:    Kelsea Adams sent in blood glucose log for review. Last date of communication was: 3-18-21.    Gestational diabetes is being managed with diet, activity and medications    Taking diabetes medications:   yes:     Diabetes Medication(s)     Insulin       insulin isophane human (HUMULIN N PEN) 100 UNIT/ML injection    15 units before bed + 2 unit prime = 17 units TDD (Expiration Date: 12/31/21 RXBIN # - [id number]      N # - [id number]      Group # [id number])          Estimated Date of Delivery: Jul 4, 2021    BG/Food Log:           Assessment:    Ketones: Negative.   Fasting blood glucoses: 40% in target.  After breakfast: 40% in target.  Before lunch: 50% in target.  After lunch: 25% in target.  Before dinner: 0% in target.  After dinner: 50% in target.    Per protocol(72.6kg x 0.10 units = 7) I recommend starting 7 units of NPH at breakfast to help with daytime BG's. I also will recommend an increase to the bedtime NPH.      Plan/Response:  Continue to check BG 6 times daily (before and one hour after each meal).  Recommend increase to insulin - NPH 0-0-0-15 to 7-0-0-16.  Follow-up on Monday.  MyChart response sent.    Christina Gautam RN, [facility]      Any diabetes medication dose changes were made via the CDE Protocol and Collaborative Practice Agreement with the patient's OB/GYN provider. A copy of this encounter was shared with the provider.

## 2021-03-24 NOTE — TELEPHONE ENCOUNTER
Dr. Kaba,    Recommend starting 7 units of NPH insulin before breakfast to help with daytime BG's. NPH prescription was updated.    Christina Gautam RN, [facility]

## 2021-03-30 ENCOUNTER — MYC MEDICAL ADVICE (OUTPATIENT)
Dept: EDUCATION SERVICES | Facility: CLINIC | Age: 36
End: 2021-03-30

## 2021-03-30 DIAGNOSIS — O24.312 PRE-EXISTING DIABETES MELLITUS AFFECTING PREGNANCY IN SECOND TRIMESTER, ANTEPARTUM: ICD-10-CM

## 2021-03-30 NOTE — TELEPHONE ENCOUNTER
Gestational Diabetes Follow-up    Subjective/Objective:    Kelsea Adams sent in blood glucose log for review. Last date of communication was: 3/23/2021.    Gestational diabetes is being managed with diet, activity and medications    Taking diabetes medications:   yes:     Diabetes Medication(s)     Insulin       insulin  UNIT/ML injection    7 units before breakfast AND 16 units before bedtime. Prime pen with 2 units before each use. TDD up to 40 units.          Estimated Date of Delivery: Jul 4, 2021    BG/Food Log:                   Assessment:    Ketones: neg.   Fasting blood glucoses: 63% in target.  After breakfast: 82% in target.  Before lunch: 50% in target.  After lunch: 14% in target.  Before dinner: 16% in target.  After dinner: 71% in target.    Plan/Response:  Recommend increase to insulin - NPH 7-0-0-16 => 9-0-0-16 units    Hi Kelsea, thank you for your numbers,  They are very hard to read and figure out when you are writing before and after.  But we so appreciate you letting us know.  If you could use this following format , it will be easier for you  Such as when you put in the numbers for 3/29   In the lunch section  88/154  And dinner  Section  87/158   By putting in the slash line we can then see the numbers better and make a better decision for you .      If you could also on a separate sheet of paper put in your meals for the next few days , all meals and snacks and send them in again on Friday, we can look to see if what you are eating is making your BG go higher.      For now, will have you increase your morning NPH from 7 units to take 9 units now and bedtime keep at 16 units.  Thank you for checking ketones, they are great.  Something you are eating is causing your numbers to go high and we can help you if we know what you are or not eating.    Take care    Brook Ortiz RN/BRISSA  [facility]      Any diabetes medication dose changes were made via the CDE Protocol  and Collaborative Practice Agreement with the patient's primary care provider and OB/GYN provider. A copy of this encounter was shared with the provider.

## 2021-04-02 ENCOUNTER — MYC MEDICAL ADVICE (OUTPATIENT)
Dept: EDUCATION SERVICES | Facility: CLINIC | Age: 36
End: 2021-04-02

## 2021-04-02 ENCOUNTER — TELEPHONE (OUTPATIENT)
Dept: OBGYN | Facility: CLINIC | Age: 36
End: 2021-04-02

## 2021-04-02 DIAGNOSIS — O24.414 WHITE CLASSIFICATION A2 GESTATIONAL DIABETES MELLITUS (GDM), INSULIN CONTROLLED: Primary | ICD-10-CM

## 2021-04-02 RX ORDER — HUMAN INSULIN 100 [IU]/ML
INJECTION, SUSPENSION SUBCUTANEOUS
Qty: 10 ML | Refills: 3 | Status: CANCELLED | OUTPATIENT
Start: 2021-04-02

## 2021-04-02 RX ORDER — SYRINGE-NEEDLE,INSULIN,0.5 ML 27GX1/2"
SYRINGE, EMPTY DISPOSABLE MISCELLANEOUS
Qty: 1 EACH | Refills: 3 | Status: SHIPPED | OUTPATIENT
Start: 2021-04-02 | End: 2021-04-06

## 2021-04-02 NOTE — TELEPHONE ENCOUNTER
Rx's signed and sent to pharm.  Please confirm if this is appropriate and follow-up if any issues.

## 2021-04-02 NOTE — TELEPHONE ENCOUNTER
Jasen notified us that pts insurance does not cover Humulin N kwikPen.   Please fax an alternative.     Routed to the diabetic pool.    Helen REDD RN

## 2021-04-02 NOTE — TELEPHONE ENCOUNTER
I see past request to submit PA for Novolin N and coupon was used for temporary coverage.  Was the PA ever sent in or completed?  Option to complete PA for Humulin or Novolin N insulin OR OB provider sign alternative vials.    If not pended orders for vial and syringe for provider approval.       Ale Beltrán MS, RD, LD, CDE

## 2021-04-05 ENCOUNTER — TELEPHONE (OUTPATIENT)
Dept: OBGYN | Facility: CLINIC | Age: 36
End: 2021-04-05

## 2021-04-05 DIAGNOSIS — R73.09 ABNORMAL GLUCOSE TOLERANCE TEST: ICD-10-CM

## 2021-04-05 DIAGNOSIS — Z34.81 PRENATAL CARE, SUBSEQUENT PREGNANCY IN FIRST TRIMESTER: ICD-10-CM

## 2021-04-05 RX ORDER — PEN NEEDLE, DIABETIC 30 GX3/16"
2 NEEDLE, DISPOSABLE MISCELLANEOUS DAILY
Qty: 100 EACH | Refills: 3 | Status: SHIPPED | OUTPATIENT
Start: 2021-04-05 | End: 2021-04-06 | Stop reason: ALTCHOICE

## 2021-04-05 NOTE — TELEPHONE ENCOUNTER
Please advise the pharmacy that it should be for 2 per day.  I think the new Rx was written for Insulin Pen Needles which was written for 1 per day inadvertantly, whereas the original Insulin syringes were for 2 per day.  So if they have the Pen Needles, those should be for 2 per day.  I revised and resent the Rx.

## 2021-04-05 NOTE — CONFIDENTIAL NOTE
Called Kelsea-   Reminded there is a new Rx for vial and syringe insulin ordered for her.     She asks why she can't use the pen? I let her know insurance will not pay for it, they will pay for the vial only.   She could buy pens out of pocket at [facility], but she declines.    Says she has not picked up vial and syringe yet, but will tomorrow.   Asked if she wanted me to send video of how to use it- she says someone did last week.     She voices she will send in numbers again on 4/9.    Merlene Saunders RD, LD, [facility]

## 2021-04-05 NOTE — TELEPHONE ENCOUNTER
[facility] pharmacy is wanting clarification on an rx that you faxed them on 4/2/21 for syringes.     What quantity to you want them to dispense? The rx is for 2 syringes per day but the quantity prescribed was 1 syringe.     Please advise.    Thanks.   Helen REDD RN

## 2021-04-06 ENCOUNTER — MYC MEDICAL ADVICE (OUTPATIENT)
Dept: EDUCATION SERVICES | Facility: CLINIC | Age: 36
End: 2021-04-06

## 2021-04-06 DIAGNOSIS — O24.414 WHITE CLASSIFICATION A2 GESTATIONAL DIABETES MELLITUS (GDM), INSULIN CONTROLLED: ICD-10-CM

## 2021-04-06 RX ORDER — SYRINGE-NEEDLE,INSULIN,0.5 ML 27GX1/2"
SYRINGE, EMPTY DISPOSABLE MISCELLANEOUS
Qty: 100 EACH | Refills: 3 | Status: SHIPPED | OUTPATIENT
Start: 2021-04-06

## 2021-04-06 RX ORDER — HUMAN INSULIN 100 [IU]/ML
INJECTION, SUSPENSION SUBCUTANEOUS
Qty: 10 ML | Refills: 3 | Status: SHIPPED | OUTPATIENT
Start: 2021-04-06 | End: 2021-04-23

## 2021-04-06 NOTE — TELEPHONE ENCOUNTER
Gestational Diabetes Follow-up    Subjective/Objective:    Kelsea Adams sent in blood glucose log for review. Last date of communication was: 3-30-21.    Gestational diabetes is being managed with diet, activity and medications    Taking diabetes medications:   yes:     Diabetes Medication(s)     Insulin       insulin NPH (NOVOLIN N VIAL) 100 UNIT/ML vial    9 units before breakfast AND 16 units before bedtime. TDD up to 40 units. May dispense Humulin N Vial if preferred.          Estimated Date of Delivery: Jul 4, 2021    BG/Food Log:           Assessment:    Ketones: Negative.   Fasting blood glucoses: 86% in target.  After breakfast: 86% in target.  Before lunch: 40% in target.  After lunch: 100% in target.  Before dinner: 33% in target.  After dinner: 67% in target.    Plan/Response:  Recommend increase to insulin - NPH 9-0-0-16 to 10-0-0-16.  Follow-up on Monday.  [employer] message sent.    Christina Gautam RN, CDCES      Any diabetes medication dose changes were made via the CDE Protocol and Collaborative Practice Agreement with the patient's OB/GYN provider. A copy of this encounter was shared with the provider.

## 2021-04-11 ENCOUNTER — NURSE TRIAGE (OUTPATIENT)
Dept: NURSING | Facility: CLINIC | Age: 36
End: 2021-04-11

## 2021-04-11 ENCOUNTER — HOSPITAL ENCOUNTER (EMERGENCY)
Facility: CLINIC | Age: 36
End: 2021-04-11
Payer: COMMERCIAL

## 2021-04-11 ENCOUNTER — HOSPITAL ENCOUNTER (OUTPATIENT)
Facility: CLINIC | Age: 36
End: 2021-04-11
Admitting: OBSTETRICS & GYNECOLOGY
Payer: COMMERCIAL

## 2021-04-11 ENCOUNTER — HOSPITAL ENCOUNTER (OUTPATIENT)
Facility: CLINIC | Age: 36
Discharge: HOME | End: 2021-04-11
Attending: OBSTETRICS & GYNECOLOGY | Admitting: OBSTETRICS & GYNECOLOGY
Payer: COMMERCIAL

## 2021-04-11 VITALS
HEART RATE: 89 BPM | DIASTOLIC BLOOD PRESSURE: 83 MMHG | HEIGHT: 62 IN | RESPIRATION RATE: 18 BRPM | TEMPERATURE: 98.3 F | BODY MASS INDEX: 29.44 KG/M2 | WEIGHT: 160 LBS | SYSTOLIC BLOOD PRESSURE: 136 MMHG

## 2021-04-11 PROBLEM — Z36.89 ENCOUNTER FOR TRIAGE IN PREGNANT PATIENT: Status: ACTIVE | Noted: 2021-04-11

## 2021-04-11 LAB
ALBUMIN UR-MCNC: NEGATIVE MG/DL
APPEARANCE UR: CLEAR
BACTERIA #/AREA URNS HPF: ABNORMAL /HPF
BILIRUB UR QL STRIP: NEGATIVE
COLOR UR AUTO: ABNORMAL
GLUCOSE UR STRIP-MCNC: NEGATIVE MG/DL
HGB UR QL STRIP: NEGATIVE
KETONES UR STRIP-MCNC: NEGATIVE MG/DL
LEUKOCYTE ESTERASE UR QL STRIP: NEGATIVE
MUCOUS THREADS #/AREA URNS LPF: PRESENT /LPF
NITRATE UR QL: NEGATIVE
PH UR STRIP: 6.5 PH (ref 5–7)
RBC #/AREA URNS AUTO: <1 /HPF (ref 0–2)
RUPTURE OF FETAL MEMBRANES BY ROM PLUS: NEGATIVE
SOURCE: ABNORMAL
SP GR UR STRIP: 1 (ref 1–1.03)
SQUAMOUS #/AREA URNS AUTO: 1 /HPF (ref 0–1)
UROBILINOGEN UR STRIP-MCNC: NORMAL MG/DL (ref 0–2)
WBC #/AREA URNS AUTO: <1 /HPF (ref 0–5)

## 2021-04-11 PROCEDURE — 84112 EVAL AMNIOTIC FLUID PROTEIN: CPT | Performed by: OBSTETRICS & GYNECOLOGY

## 2021-04-11 PROCEDURE — G0463 HOSPITAL OUTPT CLINIC VISIT: HCPCS

## 2021-04-11 PROCEDURE — 81001 URINALYSIS AUTO W/SCOPE: CPT | Performed by: OBSTETRICS & GYNECOLOGY

## 2021-04-11 ASSESSMENT — MIFFLIN-ST. JEOR: SCORE: 1384.01

## 2021-04-11 NOTE — PLAN OF CARE
Data: Patient assessed in the [facility]place for increased urination, rule out rupture of membranes.  Cervical exam not examined.  Membranes intact.  Contractions none.  Action:  Presumed adequate fetal oxygenation documented (see flow record). Discharge instructions reviewed.  Patient instructed to report change in fetal movement, vaginal leaking of fluid or bleeding, abdominal pain, or any concerns related to the pregnancy to her nurse/physician.    Response: Orders to discharge home per Noe Null.  Patient verbalized understanding of education and verbalized agreement with plan. Discharged to home at 0445.

## 2021-04-11 NOTE — PLAN OF CARE
Data: Patient presented to [facility]: 2021  3:03 AM.  Reason for maternal/fetal assessment is prolonged urination, vaginal and lower abdominal intermittent pain. Patient reports an episode of urination lasting longer than 5 minutes early this morning, and frequent voiding since. Denies pain, burning, foul odor or abnormal color with urination. States she does not feel she is able to empty her bladder with every void. Yesterday patient experienced intermittent lower abdominal and vaginal pain rated at a 4/10. Per patient, she is unable to identify aggravating or alleviating factors.  Patient currently experiencing the same intermittent pain, stating it is mostly vaginal. Normal bowel movements and is passing gas. Patient is a .  Prenatal record reviewed. Pregnancy  has been complicated by gestational diabetes, gallstones. Hx of pre-eclampsia, PTB and IUGR in last pregnancy.   Gestational Age 28w0d. VSS. Fetal movement present. Patient denies uterine contractions, leaking of vaginal fluid/rupture of membranes, vaginal bleeding, nausea, vomiting, headache, visual disturbances, epigastric or URQ pain, significant edema. Support person is not present.   Action: Verbal consent for EFM. Triage assessment completed. Bill of rights reviewed.  Response: Patient verbalized agreement with plan. Will contact Dr Noe Null with update and further orders.

## 2021-04-11 NOTE — DISCHARGE INSTRUCTIONS
Discharge Instruction for Undelivered Patients      You were seen for: increased urination, rule out rupture of membranes.  We Consulted: Dr. Null  You had (Test or Medicine): fetal monitoring, urinalysis, ROM+     Diet:   Drink 8 to 12 glasses of liquids (milk, juice, water) every day.  You may eat meals and snacks.  To manager your diabetes, follow the guidelines for eating and drinking given to you by your Clinic Provider or Diabetes Educator.       Activity:  Call your doctor or nurse midwife if your baby is moving less than usual.     Call your provider if you notice:  Swelling in your face or increased swelling in your hands or legs.  Headaches that are not relieved by Tylenol (acetaminophen).  Changes in your vision (blurring: seeing spots or stars.)  Nausea (sick to your stomach) and vomiting (throwing up).   Weight gain of 5 pounds or more per week.  Heartburn that doesn't go away.  Signs of bladder infection: pain when you urinate (use the toilet), need to go more often and more urgently.  The bag of mckeon (rupture of membranes) breaks, or you notice leaking in your underwear.  Bright red blood in your underwear.  Abdominal (lower belly) or stomach pain.  Second (plus) baby: Contractions (tightening) less than 10 minutes apart and getting stronger.  *If less than 34 weeks: Contractions (tightenings) more than 6 times in one hour.  Increase or change in vaginal discharge (note the color and amount)    Follow-up:  As scheduled in the clinic

## 2021-04-11 NOTE — TELEPHONE ENCOUNTER
OB Triage Call    Reason for call:   Abnormal flow when urinating, questioning if it was more than urine    Assessment:   Went to the bathroom 10 min ago, sitting there and urine kept coming out for 5 min straight    Able to stop flow, but when would relax the flow would return     No increase in fluid intake yesterday    Feels like it is starting again now    Yesterday started having lower abdominal pain and vaginal pain intermittently all day yesterday.   3x per hour  They were lasting 15-20 sec  *this has subsided    This does not feel the same as when her membranes ruptured with her last child    Some white discharge    No vaginal bleeding  No fever  No abdominal pain now  No other pains    Plan:   Paged On-call Dr. Null who states patient should go to L&D to rule out rupture of membranes.     Notified patient of provider recommendations and she is agreeable. States she will be there in 20 min    Patient plans to deliver at [facility]   Patient's primary OB Provider is Dr TYLER Kaba.    Patient's primary OB provider is a Physician.  Labor and delivery at [facility] ([phone]) notified of patient's pending arrival.  Report given to ZULY Feliz .      28w0d  Estimated Date of Delivery: 2021        OB History    Para Term  AB Living   2 1 0 1 0 1   SAB TAB Ectopic Multiple Live Births   0 0 0 0 1      # Outcome Date GA Lbr Dru/2nd Weight Sex Delivery Anes PTL Lv   2 Current            1  11/10/16 36w6d  2.54 kg (5 lb 9.6 oz) F CS-LTranv Spinal N TENISHA      Birth Comments: New band: [id number] FDR,  Old band:[id number] FDR      Name: MARILYNMATTHEW RANJITKYLE      Apgar1: 8  Apgar5: 9       No results found for: GBS       Meka Muñiz RN 21 2:12 AM  [facility] Nurse Advisor      Reason for Disposition    Leakage of fluid from vagina    Additional Information    Negative: [1] SEVERE abdominal pain (e.g., excruciating) AND [2] constant AND [3] present > 1 hour    Negative: Severe  "bleeding (e.g., continuous red blood from vagina, or large blood clots)    Negative: Umbilical cord hanging out of the vagina (shiny, white, curled appearance, \"like telephone cord\")    Negative: Uncontrollable urge to push (i.e., feels like baby is coming out now)    Negative: Can see baby    Negative: Sounds like a life-threatening emergency to the triager    Negative: < 20 weeks pregnant    Negative: Vaginal bleeding    Protocols used: PREGNANCY - RUPTURE OF [id number]    Meka Muñiz RN on 4/11/2021 at 2:34 AM    "

## 2021-04-13 ENCOUNTER — OFFICE VISIT (OUTPATIENT)
Dept: MATERNAL FETAL MEDICINE | Facility: CLINIC | Age: 36
End: 2021-04-13
Attending: OBSTETRICS & GYNECOLOGY
Payer: COMMERCIAL

## 2021-04-13 ENCOUNTER — HOSPITAL ENCOUNTER (OUTPATIENT)
Dept: ULTRASOUND IMAGING | Facility: CLINIC | Age: 36
End: 2021-04-13
Attending: OBSTETRICS & GYNECOLOGY
Payer: COMMERCIAL

## 2021-04-13 DIAGNOSIS — O24.112 PRE-EXISTING TYPE 2 DIABETES MELLITUS DURING PREGNANCY IN SECOND TRIMESTER: ICD-10-CM

## 2021-04-13 DIAGNOSIS — O24.919 DIABETES MELLITUS AFFECTING PREGNANCY, ANTEPARTUM: Primary | ICD-10-CM

## 2021-04-13 PROCEDURE — 76816 OB US FOLLOW-UP PER FETUS: CPT

## 2021-04-13 PROCEDURE — 76816 OB US FOLLOW-UP PER FETUS: CPT | Mod: 26 | Performed by: OBSTETRICS & GYNECOLOGY

## 2021-04-13 NOTE — PROGRESS NOTES
Please see full imaging report from ViewPoint program under imaging tab.    Dallin Shankar MD  Maternal Fetal Medicine

## 2021-04-14 ENCOUNTER — MYC MEDICAL ADVICE (OUTPATIENT)
Dept: EDUCATION SERVICES | Facility: CLINIC | Age: 36
End: 2021-04-14

## 2021-04-14 DIAGNOSIS — O24.414 WHITE CLASSIFICATION A2 GESTATIONAL DIABETES MELLITUS (GDM), INSULIN CONTROLLED: ICD-10-CM

## 2021-04-14 NOTE — TELEPHONE ENCOUNTER
Gestational Diabetes Follow-up    Subjective/Objective:    Kelsea Adams sent in blood glucose log for review. Last date of communication was: 4/6/2021    Gestational diabetes is being managed with medications    Taking diabetes medications:   yes:     Diabetes Medication(s)     Insulin       insulin NPH (NOVOLIN N VIAL) 100 UNIT/ML vial    9 units before breakfast AND 16 units before bedtime. TDD up to 40 units. May dispense Humulin N Vial if preferred.          Estimated Date of Delivery: Jul 4, 2021    BG/Food Log:             Assessment:    Ketones: neg.   Fasting blood glucoses: 88% in target.  After breakfast: 75% in target.  Before lunch: 40% in target.  After lunch: 100% in target.  Before dinner: 60% in target.  After dinner: 42% in target.    Blood sugars after lunch are sometimes <100, so even though her pre meal numbers are elevated, I hesitate to increase angela daytime NPH for increased risk of low blood sugars in the afternoon. May need to consider meal time insulin at dinner.    Plan/Response:  No changes in the patient's current treatment plan.  Follow-up on Monday.    KEVIN Mix CDE    Any diabetes medication dose changes were made via the CDE Protocol and Collaborative Practice Agreement with the patient's OB/GYN provider. A copy of this encounter was shared with the provider.

## 2021-04-15 ENCOUNTER — PRENATAL OFFICE VISIT (OUTPATIENT)
Dept: OBGYN | Facility: CLINIC | Age: 36
End: 2021-04-15
Payer: COMMERCIAL

## 2021-04-15 VITALS
BODY MASS INDEX: 29.63 KG/M2 | HEART RATE: 96 BPM | WEIGHT: 162 LBS | DIASTOLIC BLOOD PRESSURE: 80 MMHG | SYSTOLIC BLOOD PRESSURE: 122 MMHG

## 2021-04-15 DIAGNOSIS — F41.8 DEPRESSION WITH ANXIETY: ICD-10-CM

## 2021-04-15 DIAGNOSIS — Z98.891 HISTORY OF C-SECTION: ICD-10-CM

## 2021-04-15 DIAGNOSIS — Z34.83 PRENATAL CARE, SUBSEQUENT PREGNANCY IN THIRD TRIMESTER: Primary | ICD-10-CM

## 2021-04-15 DIAGNOSIS — Z30.2 REQUEST FOR STERILIZATION: ICD-10-CM

## 2021-04-15 PROCEDURE — 99207 PR PRENATAL VISIT: CPT | Performed by: OBSTETRICS & GYNECOLOGY

## 2021-04-15 RX ORDER — PEN NEEDLE, DIABETIC 32GX 5/32"
NEEDLE, DISPOSABLE MISCELLANEOUS
COMMUNITY
Start: 2021-04-05 | End: 2021-09-30

## 2021-04-15 RX ORDER — CITALOPRAM HYDROBROMIDE 10 MG/1
10 TABLET ORAL DAILY
Qty: 90 TABLET | Refills: 1 | Status: SHIPPED | OUTPATIENT
Start: 2021-04-15

## 2021-04-15 RX ORDER — HYDROXYZINE PAMOATE 50 MG/1
50 CAPSULE ORAL
Qty: 30 CAPSULE | Refills: 1 | Status: ON HOLD | OUTPATIENT
Start: 2021-04-15 | End: 2021-06-23

## 2021-04-15 RX ORDER — INSULIN HUMAN 100 [IU]/ML
INJECTION, SUSPENSION SUBCUTANEOUS
COMMUNITY
Start: 2021-02-02 | End: 2021-04-23

## 2021-04-15 ASSESSMENT — PATIENT HEALTH QUESTIONNAIRE - PHQ9
5. POOR APPETITE OR OVEREATING: SEVERAL DAYS
SUM OF ALL RESPONSES TO PHQ QUESTIONS 1-9: 4

## 2021-04-15 ASSESSMENT — ANXIETY QUESTIONNAIRES
2. NOT BEING ABLE TO STOP OR CONTROL WORRYING: NOT AT ALL
3. WORRYING TOO MUCH ABOUT DIFFERENT THINGS: SEVERAL DAYS
GAD7 TOTAL SCORE: 5
5. BEING SO RESTLESS THAT IT IS HARD TO SIT STILL: NOT AT ALL
IF YOU CHECKED OFF ANY PROBLEMS ON THIS QUESTIONNAIRE, HOW DIFFICULT HAVE THESE PROBLEMS MADE IT FOR YOU TO DO YOUR WORK, TAKE CARE OF THINGS AT HOME, OR GET ALONG WITH OTHER PEOPLE: SOMEWHAT DIFFICULT
6. BECOMING EASILY ANNOYED OR IRRITABLE: SEVERAL DAYS
7. FEELING AFRAID AS IF SOMETHING AWFUL MIGHT HAPPEN: SEVERAL DAYS
1. FEELING NERVOUS, ANXIOUS, OR ON EDGE: SEVERAL DAYS

## 2021-04-15 NOTE — NURSING NOTE
Paperwork for FMLA for her  was received and completed during the office visit today. Patient was given back that completed paperwork. Copy made and sent to scanning to be added to the chart

## 2021-04-15 NOTE — PROGRESS NOTES
Doing well.   Complains of worsening depression and anxiety. She used to have it treated with herbal medication, but is uneasy about taking it in pregnancy given its unknown affects on pregnancy. She denies suicidal ideation and homicidal ideation.   She is certain now about sterilization and requests to have this done at the time of her .   She is also requesting a breast pump prescription. She also wants to have FMLA paperwork filled for her  given the care he has had to provide for her.   Reports that her blood glucose levels particularly at night have been off target; currently taking Humalin 10 units in the AM, 16 units PM  Denies VB, ctx, LOF. +FM  /80 (BP Location: Right arm, Cuff Size: Adult Regular)   Pulse 96   Wt 73.5 kg (162 lb)   LMP 2020 (Exact Date)   BMI 29.63 kg/m    General Appearance: NAD  Abdomen: Gravid, NT  Refer to flow sheet above.   A/P: 35 year old  at 28w4d  -- depression: offered medical therapy, but patient declines and she feels she can cope with it at this time  -- GDMA2 (possible pre-existing DM given that failure of early 1 hr GCT):  ultrasound surveillance starting at 32w with serial growth ultrasounds and twice weekly BPPs; encouraged to remain connected with diabetic educators/dietician for review of blood glucose monitoring and management of insulin dosing  -- desires sterilization: federal tubal papers signed today   -- FMLA paperwork signed; breast pump prescription signed and given to patient   -- PTL precautions reviewed  RTC in 2 weeks     Gregoria Kaba MD  [facility]

## 2021-04-15 NOTE — NURSING NOTE
"Chief Complaint   Patient presents with     Prenatal Care     28 weeks and 4 days. Fast heart rate in the evening, feels like a panick or anxiety attack. Has a history of anxiety and depression. Has sad feeling in the evening also. Patient also brought FMLA forms for her  to be filled out for postpartum care.       Initial /80 (BP Location: Right arm, Cuff Size: Adult Regular)   Pulse 96   Wt 73.5 kg (162 lb)   LMP 2020 (Exact Date)   BMI 29.63 kg/m   Estimated body mass index is 29.63 kg/m  as calculated from the following:    Height as of 21: 1.575 m (5' 2\").    Weight as of this encounter: 73.5 kg (162 lb).  BP completed using cuff size: regular    Questioned patient about current smoking habits.  Pt. has never smoked.                       "

## 2021-04-15 NOTE — LETTER
[facility]  [address]  [address]  [place] [address]  Phone: [phone]  Fax: [phone]      April 15, 2021      Kelsea Adams  [address]   [place] [address]          To whom it may concern:    Babak Adams, who's wife, Kelsea Adams is being seen in our for prenatal care was absent from work on the dates of 4/8/2021 to 4/10/2021 to take care of his wife. Please excuse him for his absence to attend to his wife's emergent medical care.  In the future, if there is ongoing emergent need for medical care, then he may be excused from work to help with his wife's medical care        Sincerely,      Gregoria Kaba MD  [facility]

## 2021-04-16 ASSESSMENT — ANXIETY QUESTIONNAIRES: GAD7 TOTAL SCORE: 5

## 2021-04-20 ENCOUNTER — MYC MEDICAL ADVICE (OUTPATIENT)
Dept: EDUCATION SERVICES | Facility: CLINIC | Age: 36
End: 2021-04-20

## 2021-04-20 DIAGNOSIS — O24.419 GESTATIONAL DIABETES MELLITUS (GDM) AFFECTING PREGNANCY, ANTEPARTUM: ICD-10-CM

## 2021-04-20 NOTE — TELEPHONE ENCOUNTER
Gestational Diabetes Follow-up    Subjective/Objective:    Kelsea Adams sent in blood glucose log for review. Last date of communication was: 4/14/2021.    Gestational diabetes is being managed with medications    Taking diabetes medications:   yes:     Diabetes Medication(s)     Insulin       HUMULIN N KWIKPEN 100 UNIT/ML susp         insulin NPH (NOVOLIN N VIAL) 100 UNIT/ML vial    9 units before breakfast AND 16 units before bedtime. TDD up to 40 units. May dispense Humulin N Vial if preferred.          Estimated Date of Delivery: Jul 4, 2021    BG/Food Log:           Assessment of BG's starting 4/15:    Ketones: neg.   Fasting blood glucoses: 83% in target  After breakfast: 83% in target.  Before lunch: 100% in target.  After lunch: 100% in target.  Before dinner: 0% in target.  After dinner: 60% in target.    The only out of target fasting number was following a night of forgotten insulin. Her post dinner numbers vary considerably and it is a little hard to tease out the cause of these fluctuations. Will have her continue to write down dinner foods for now.     Plan/Response:  No changes in the patient's current treatment plan.  Follow-up in 3-4 days.    KEVIN Mix CDE    Any diabetes medication dose changes were made via the CDE Protocol and Collaborative Practice Agreement with the patient's OB/GYN provider. A copy of this encounter was shared with the provider.

## 2021-04-23 ENCOUNTER — MYC MEDICAL ADVICE (OUTPATIENT)
Dept: EDUCATION SERVICES | Facility: CLINIC | Age: 36
End: 2021-04-23

## 2021-04-23 DIAGNOSIS — O24.419 GESTATIONAL DIABETES MELLITUS (GDM) AFFECTING PREGNANCY, ANTEPARTUM: ICD-10-CM

## 2021-04-23 DIAGNOSIS — O24.414 WHITE CLASSIFICATION A2 GESTATIONAL DIABETES MELLITUS (GDM), INSULIN CONTROLLED: ICD-10-CM

## 2021-04-23 RX ORDER — HUMAN INSULIN 100 [IU]/ML
INJECTION, SUSPENSION SUBCUTANEOUS
Qty: 10 ML | Refills: 3 | Status: SHIPPED | OUTPATIENT
Start: 2021-04-23 | End: 2021-04-23

## 2021-04-23 RX ORDER — HUMAN INSULIN 100 [IU]/ML
INJECTION, SUSPENSION SUBCUTANEOUS
Qty: 10 ML | Refills: 3 | Status: SHIPPED | OUTPATIENT
Start: 2021-04-23 | End: 2021-04-27

## 2021-04-23 NOTE — TELEPHONE ENCOUNTER
Gestational Diabetes Follow-up    Subjective/Objective:    Kelsea Adams sent in blood glucose log for review. Last date of communication was: 4-20-21.    Gestational diabetes is being managed with diet, activity and medications    Taking diabetes medications:   yes:     Diabetes Medication(s)     Insulin       HUMULIN N KWIKPEN 100 UNIT/ML susp         insulin NPH (NOVOLIN N VIAL) 100 UNIT/ML vial    9 units before breakfast AND 16 units before bedtime. TDD up to 40 units. May dispense Humulin N Vial if preferred.          Estimated Date of Delivery: Jul 4, 2021    BG/Food Log:       Assessment:    Ketones: Not noted.   Fasting blood glucoses: 100% in target.  After breakfast: 100% in target.  Before lunch: 100% in target.  After lunch: 100% in target.  Before dinner: 0% in target.  After dinner: 100% in target.    Pt would benefit in an increase to her am NPH to help with her pre dinner BG's.    Plan/Response:  Recommend increase to insulin - NPH 9-0-0-16 to 10-0-0-16.   Patient responded to [employer] message about dose changes and said she takes 10 units of NPH in the morning and has been doing that for a month now. I told her the med list was not update, but since she is already doing 10 I asked her to give 11 units in the am. NEW NPH doses 11-0-0-16.  Follow-up on Tuesday.  [employer] response sent.    Christina Gautam RN, [facility]      Any diabetes medication dose changes were made via the CDE Protocol and Collaborative Practice Agreement with the patient's OB/GYN provider. A copy of this encounter was shared with the provider.

## 2021-04-27 ENCOUNTER — MYC MEDICAL ADVICE (OUTPATIENT)
Dept: EDUCATION SERVICES | Facility: CLINIC | Age: 36
End: 2021-04-27

## 2021-04-27 DIAGNOSIS — O24.419 GESTATIONAL DIABETES MELLITUS (GDM) AFFECTING PREGNANCY, ANTEPARTUM: ICD-10-CM

## 2021-04-27 DIAGNOSIS — O24.414 WHITE CLASSIFICATION A2 GESTATIONAL DIABETES MELLITUS (GDM), INSULIN CONTROLLED: ICD-10-CM

## 2021-04-27 RX ORDER — HUMAN INSULIN 100 [IU]/ML
INJECTION, SUSPENSION SUBCUTANEOUS
Qty: 10 ML | Refills: 3 | Status: SHIPPED | OUTPATIENT
Start: 2021-04-27 | End: 2021-05-07

## 2021-04-27 NOTE — TELEPHONE ENCOUNTER
Gestational Diabetes Follow-up    Subjective/Objective:    Kelsea Adams sent in blood glucose log for review. Last date of communication was: 4-23-21.    Gestational diabetes is being managed with diet, activity and medications    Taking diabetes medications:   yes:     Diabetes Medication(s)     Insulin       insulin NPH (NOVOLIN N VIAL) 100 UNIT/ML vial    11 units before breakfast AND 16 units before bedtime. TDD up to 40 units. May dispense Humulin N Vial if preferred. Dose update for next fill.          Estimated Date of Delivery: Jul 4, 2021    BG/Food Log:         Assessment:    Ketones: Negative.   Fasting blood glucoses: 75% in target.  After breakfast: 100% in target.  After lunch: 66% in target.  Before dinner: 0% in target.  After dinner: 50% in target. (2 BG's total)    Plan/Response:  Continue to check BG 6 times daily (before and one hour after each meal).  Recommend increase to insulin - NPH 11-0-0-16 to 12-0-0-16.  Follow-up on Monday.  [employer] message sent.    Christina Gautam RN, [facility]      Any diabetes medication dose changes were made via the CDE Protocol and Collaborative Practice Agreement with the patient's OB/GYN provider. A copy of this encounter was shared with the provider.

## 2021-05-04 ENCOUNTER — PRENATAL OFFICE VISIT (OUTPATIENT)
Dept: OBGYN | Facility: CLINIC | Age: 36
End: 2021-05-04
Payer: COMMERCIAL

## 2021-05-04 ENCOUNTER — TELEPHONE (OUTPATIENT)
Dept: OBGYN | Facility: CLINIC | Age: 36
End: 2021-05-04

## 2021-05-04 ENCOUNTER — PREP FOR PROCEDURE (OUTPATIENT)
Dept: OBGYN | Facility: CLINIC | Age: 36
End: 2021-05-04

## 2021-05-04 VITALS
HEART RATE: 87 BPM | SYSTOLIC BLOOD PRESSURE: 122 MMHG | DIASTOLIC BLOOD PRESSURE: 82 MMHG | BODY MASS INDEX: 29.54 KG/M2 | WEIGHT: 161.5 LBS

## 2021-05-04 DIAGNOSIS — Z34.83 PRENATAL CARE, SUBSEQUENT PREGNANCY IN THIRD TRIMESTER: Primary | ICD-10-CM

## 2021-05-04 DIAGNOSIS — Z98.891 HISTORY OF C-SECTION: ICD-10-CM

## 2021-05-04 DIAGNOSIS — Z30.2 REQUEST FOR STERILIZATION: ICD-10-CM

## 2021-05-04 DIAGNOSIS — Z98.891 HISTORY OF C-SECTION: Primary | ICD-10-CM

## 2021-05-04 LAB
ERYTHROCYTE [DISTWIDTH] IN BLOOD BY AUTOMATED COUNT: 14.5 % (ref 10–15)
HCT VFR BLD AUTO: 35.5 % (ref 35–47)
HGB BLD-MCNC: 11.6 G/DL (ref 11.7–15.7)
MCH RBC QN AUTO: 25.4 PG (ref 26.5–33)
MCHC RBC AUTO-ENTMCNC: 32.7 G/DL (ref 31.5–36.5)
MCV RBC AUTO: 78 FL (ref 78–100)
PLATELET # BLD AUTO: 293 10E9/L (ref 150–450)
RBC # BLD AUTO: 4.56 10E12/L (ref 3.8–5.2)
WBC # BLD AUTO: 14.5 10E9/L (ref 4–11)

## 2021-05-04 PROCEDURE — 36415 COLL VENOUS BLD VENIPUNCTURE: CPT | Performed by: OBSTETRICS & GYNECOLOGY

## 2021-05-04 PROCEDURE — 99000 SPECIMEN HANDLING OFFICE-LAB: CPT | Performed by: OBSTETRICS & GYNECOLOGY

## 2021-05-04 PROCEDURE — 99207 PR PRENATAL VISIT: CPT | Performed by: OBSTETRICS & GYNECOLOGY

## 2021-05-04 PROCEDURE — 85027 COMPLETE CBC AUTOMATED: CPT | Performed by: OBSTETRICS & GYNECOLOGY

## 2021-05-04 PROCEDURE — 86780 TREPONEMA PALLIDUM: CPT | Mod: 90 | Performed by: OBSTETRICS & GYNECOLOGY

## 2021-05-04 RX ORDER — SODIUM CHLORIDE, SODIUM LACTATE, POTASSIUM CHLORIDE, CALCIUM CHLORIDE 600; 310; 30; 20 MG/100ML; MG/100ML; MG/100ML; MG/100ML
INJECTION, SOLUTION INTRAVENOUS CONTINUOUS
Status: CANCELLED | OUTPATIENT
Start: 2021-05-04

## 2021-05-04 RX ORDER — CITRIC ACID/SODIUM CITRATE 334-500MG
30 SOLUTION, ORAL ORAL
Status: CANCELLED | OUTPATIENT
Start: 2021-05-04

## 2021-05-04 NOTE — NURSING NOTE
"Chief Complaint   Patient presents with     Prenatal Care     31 weeks 2 days   MFM 2021  BPP'S start  2021        Initial /82 (BP Location: Right arm, Cuff Size: Adult Regular)   Pulse 87   Wt 73.3 kg (161 lb 8 oz)   LMP 2020 (Exact Date)   Breastfeeding No   BMI 29.54 kg/m   Estimated body mass index is 29.54 kg/m  as calculated from the following:    Height as of 21: 1.575 m (5' 2\").    Weight as of this encounter: 73.3 kg (161 lb 8 oz).  BP completed using cuff size: regular    Questioned patient about current smoking habits.  Pt. quit smoking some time ago.    31w2d      The following HM Due: NONE    +FM daily   + GDM following   +MFM and BPP's starting next week       Saray Navarrete, CMA on 2021 at 2:55 PM        "

## 2021-05-04 NOTE — LETTER
May 4, 2021      Kelsea Adams  [address]   [place] [address]        To Whom It May Concern,      Kelsea is under my care for her current pregnancy.  Please excuse Babak Mode from work to transport  her to any remaining appointments.        Sincerely,        Gregoria Kaba MD

## 2021-05-04 NOTE — TELEPHONE ENCOUNTER
Type of surgery:  section; bilateral salpingectomy  Location of surgery: [facility]  Date and time of surgery: 21 @ 7:30 am  Surgeon: Dr. Kinney  Pre-Op Appt Date: @ prenatal appointment  Post-Op Appt Date: 21   Packet sent out: Yes  Pre-cert/Authorization completed:  No  Date: 21

## 2021-05-04 NOTE — LETTER
May 4, 2021      Kelsea Adams  [address]   [place] [address]        To Whom It May Concern,        Kelsea id under my care for her current pregnancy. Please excuse Babak Coello from work to transport her to any remaining appointments,  as well time off to care for her during    past and possibly future bedrest.            Sincerely,        Gregoria Kaba MD

## 2021-05-04 NOTE — PROGRESS NOTES
Doing well.   No complaints.   Denies VB, ctx, LOF. +FM  LMP 2020 (Exact Date)   General Appearance: NAD  Abdomen: Gravid, NT  Refer to flow sheet above.   A/P: 35 year old  at 31w2d  -- GDMA2 (possible pre-existing DM given that failure of early 1 hr GCT): ongoing  ultrasound surveillance q4 week  serial growth ultrasounds and twice weekly BPPs; encouraged to remain connected with diabetic educators/dietician for review of blood glucose monitoring and management of insulin dosing  -- hx of  and desire for sterilization: orders placed and scheduled for 2021  -- letter given again for her 's absence from work to help her while she is on bed rest   -- CBC and RPR ordered   -- PTL precautions reviewed  RTC in 2 weeks   Gregoria Kaba MD  [facility]

## 2021-05-05 LAB — T PALLIDUM AB SER QL: NONREACTIVE

## 2021-05-07 ENCOUNTER — MYC MEDICAL ADVICE (OUTPATIENT)
Dept: EDUCATION SERVICES | Facility: CLINIC | Age: 36
End: 2021-05-07

## 2021-05-07 DIAGNOSIS — O24.414 WHITE CLASSIFICATION A2 GESTATIONAL DIABETES MELLITUS (GDM), INSULIN CONTROLLED: ICD-10-CM

## 2021-05-07 NOTE — TELEPHONE ENCOUNTER
Gestational Diabetes Follow-up    Subjective/Objective:    Kelsea Adams sent in blood glucose log for review. Last date of communication was: 4/27/21.    Gestational diabetes is being managed with diet, activity and medications    Taking diabetes medications:   yes:     Diabetes Medication(s)     Insulin       insulin NPH (NOVOLIN N VIAL) 100 UNIT/ML vial    12 units before breakfast AND 16 units before bedtime. TDD up to 40 units. May dispense Humulin N Vial if preferred. Dose update for next fill.          Estimated Date of Delivery: Jul 4, 2021, 31w5d    BG/Food Log:         Assessment:  Juice, added sugar, and fast food rice are main causes of elevated blood sugars.  With usual foods readings come into target.  Pre dinner readings are frequently above target even if post lunch readings are in target.  Recommend increase to day time insulin.  Patient may be in higher hormone phase of pregnancy and recommend increased frequency of follow ups    Ketones: negative.   Fasting blood glucoses: 85% in target.  After breakfast: 77% in target.  Before lunch: 56% in target.  After lunch: 55% in target.  Before dinner: 25% in target.  After dinner: 73% in target.    Plan/Response:  Recommend increase to insulin - Novolin 12-0-0-16 --> 16-0-0-16    Follow up Tuesday next week to reassess need for mealtime insulin.    Ale Beltrán MS, RD, LD, CDE      Any diabetes medication dose changes were made via the CDE Protocol and Collaborative Practice Agreement with the patient's OB/GYN provider. A copy of this encounter was shared with the provider.

## 2021-05-10 RX ORDER — HUMAN INSULIN 100 [IU]/ML
INJECTION, SUSPENSION SUBCUTANEOUS
Qty: 10 ML | Refills: 3 | Status: SHIPPED | OUTPATIENT
Start: 2021-05-10 | End: 2021-05-13

## 2021-05-11 ENCOUNTER — OFFICE VISIT (OUTPATIENT)
Dept: MATERNAL FETAL MEDICINE | Facility: CLINIC | Age: 36
End: 2021-05-11
Attending: OBSTETRICS & GYNECOLOGY
Payer: COMMERCIAL

## 2021-05-11 ENCOUNTER — HOSPITAL ENCOUNTER (OUTPATIENT)
Dept: ULTRASOUND IMAGING | Facility: CLINIC | Age: 36
End: 2021-05-11
Attending: OBSTETRICS & GYNECOLOGY
Payer: COMMERCIAL

## 2021-05-11 DIAGNOSIS — O24.919 DIABETES MELLITUS AFFECTING PREGNANCY, ANTEPARTUM: ICD-10-CM

## 2021-05-11 DIAGNOSIS — O24.919 DIABETES MELLITUS AFFECTING PREGNANCY, ANTEPARTUM: Primary | ICD-10-CM

## 2021-05-11 PROCEDURE — 76819 FETAL BIOPHYS PROFIL W/O NST: CPT | Mod: 26 | Performed by: OBSTETRICS & GYNECOLOGY

## 2021-05-11 PROCEDURE — 76816 OB US FOLLOW-UP PER FETUS: CPT | Mod: 26 | Performed by: OBSTETRICS & GYNECOLOGY

## 2021-05-11 PROCEDURE — 76819 FETAL BIOPHYS PROFIL W/O NST: CPT

## 2021-05-11 NOTE — PROGRESS NOTES
Kelsea Bryan was seen for an ultrasound today at the [facility].      For the details of the ultrasound please see the report which can be found under the imaging tab.      Priscilla Raza MD  , OB/GYN  Maternal-Fetal Medicine  [email or web address]  [phone] (Main MFM Office)  [id number] or [phone] (for 24 hour MFM questions)  [phone] (Pager)

## 2021-05-13 ENCOUNTER — MYC MEDICAL ADVICE (OUTPATIENT)
Dept: EDUCATION SERVICES | Facility: CLINIC | Age: 36
End: 2021-05-13

## 2021-05-13 DIAGNOSIS — O24.414 WHITE CLASSIFICATION A2 GESTATIONAL DIABETES MELLITUS (GDM), INSULIN CONTROLLED: ICD-10-CM

## 2021-05-13 DIAGNOSIS — R73.09 ABNORMAL GLUCOSE TOLERANCE TEST: ICD-10-CM

## 2021-05-13 DIAGNOSIS — Z34.81 PRENATAL CARE, SUBSEQUENT PREGNANCY IN FIRST TRIMESTER: ICD-10-CM

## 2021-05-13 RX ORDER — HUMAN INSULIN 100 [IU]/ML
INJECTION, SUSPENSION SUBCUTANEOUS
Qty: 10 ML | Refills: 3
Start: 2021-05-13 | End: 2021-05-18

## 2021-05-13 RX ORDER — LANCETS
EACH MISCELLANEOUS
Qty: 100 EACH | Refills: 3 | Status: SHIPPED | OUTPATIENT
Start: 2021-05-13

## 2021-05-13 RX ORDER — BLOOD SUGAR DIAGNOSTIC
STRIP MISCELLANEOUS
Qty: 200 STRIP | Refills: 4 | Status: SHIPPED | OUTPATIENT
Start: 2021-05-13 | End: 2021-09-30

## 2021-05-13 NOTE — TELEPHONE ENCOUNTER
Gestational Diabetes Follow-up    Subjective/Objective:    Kelsea Adams sent in blood glucose log for review. Last date of communication was: 5/7/2021.    Gestational diabetes is being managed with diet, activity and medications    Taking diabetes medications:   yes:     Diabetes Medication(s)     Insulin       insulin NPH (NOVOLIN N VIAL) 100 UNIT/ML vial    16 units before breakfast AND 16 units before bedtime. TDD up to 40 units. May dispense Humulin N Vial if preferred. Dose update for next fill.          Estimated Date of Delivery: Jul 4, 2021    BG/Food Log:           Assessment:    Ketones: neg.   Fasting blood glucoses: 86% in target.  After breakfast: 86% in target.  Before lunch: 100% in target.  After lunch: 67% in target.  Before dinner: 0% in target.  After dinner: 66% in target.    Plan/Response:      Yvon Enamorado, I am helping with messages today.  For the most part you are doing well, but like Ale, concerned with the pre dinner numbers that should be 95 or less.  You were not in target at all, but after meals are in target 66-67%.  You still may need some pre meal insulin, but would like to have you increase your morning insulin from 16 to 18 units and keep bedtime to 16 units.       Please send in your numbers again on Monday so we can reassess BG from over the weekend.  Try to walk in the afternoon and/or before dinner to see if that gets your numbers down, be sure to not drink juices at this time, and limit the amount of rice for now.  As your hormones increase , your pancrease just cannot cover high BG from juice and rice on top of the mommy hormones.  Give this a try and let's look at your numbers again on Monday,  Try to keep that time frame so we can keep you and baby in good control.     Meal Plan Recommendation: 2 carbs at breakfast, 3 carbs at lunch, 3 carbs at supper, 2 carbs at each of 3 snacks between meals proteins with all meals and snacks    Continue to check BG 4 times daily  (fasting and one hour(s) after each meal). Please continue to test before meals and 1 hr after meals, or indicate * if you are testing 2 hrs after       Recommend increase insulin - Novolin 16-0-0-16  --> 18-0-0-16    Enjoy the weekend    I also sent in refills for lancets and test strips, when you run out just call pharmacy and they have refill for you to get     Brook Ortiz RN/BRISSA  [facility]      Any diabetes medication dose changes were made via the CDE Protocol and Collaborative Practice Agreement with the patient's primary care provider and endocrinology provider. A copy of this encounter was shared with the provider.

## 2021-05-14 ENCOUNTER — OFFICE VISIT (OUTPATIENT)
Dept: MATERNAL FETAL MEDICINE | Facility: CLINIC | Age: 36
End: 2021-05-14
Attending: OBSTETRICS & GYNECOLOGY
Payer: COMMERCIAL

## 2021-05-14 ENCOUNTER — HOSPITAL ENCOUNTER (OUTPATIENT)
Dept: ULTRASOUND IMAGING | Facility: CLINIC | Age: 36
End: 2021-05-14
Attending: OBSTETRICS & GYNECOLOGY
Payer: COMMERCIAL

## 2021-05-14 DIAGNOSIS — O24.919 DIABETES MELLITUS AFFECTING PREGNANCY, ANTEPARTUM: ICD-10-CM

## 2021-05-14 DIAGNOSIS — O24.113 PRE-EXISTING TYPE 2 DIABETES MELLITUS DURING PREGNANCY IN THIRD TRIMESTER: Primary | ICD-10-CM

## 2021-05-14 PROCEDURE — 76819 FETAL BIOPHYS PROFIL W/O NST: CPT | Mod: 26 | Performed by: OBSTETRICS & GYNECOLOGY

## 2021-05-14 PROCEDURE — 76819 FETAL BIOPHYS PROFIL W/O NST: CPT

## 2021-05-14 NOTE — PROGRESS NOTES
"Please see \"Imaging\" tab under \"Chart Review\" for details of today's US at the [facility].    Hermann Armendariz MD  Maternal-Fetal Medicine    "

## 2021-05-18 ENCOUNTER — MYC MEDICAL ADVICE (OUTPATIENT)
Dept: EDUCATION SERVICES | Facility: CLINIC | Age: 36
End: 2021-05-18

## 2021-05-18 ENCOUNTER — HOSPITAL ENCOUNTER (OUTPATIENT)
Dept: ULTRASOUND IMAGING | Facility: CLINIC | Age: 36
End: 2021-05-18
Attending: OBSTETRICS & GYNECOLOGY
Payer: COMMERCIAL

## 2021-05-18 ENCOUNTER — OFFICE VISIT (OUTPATIENT)
Dept: MATERNAL FETAL MEDICINE | Facility: CLINIC | Age: 36
End: 2021-05-18
Attending: OBSTETRICS & GYNECOLOGY
Payer: COMMERCIAL

## 2021-05-18 ENCOUNTER — PRENATAL OFFICE VISIT (OUTPATIENT)
Dept: OBGYN | Facility: CLINIC | Age: 36
End: 2021-05-18
Payer: COMMERCIAL

## 2021-05-18 VITALS
DIASTOLIC BLOOD PRESSURE: 82 MMHG | BODY MASS INDEX: 29.52 KG/M2 | SYSTOLIC BLOOD PRESSURE: 122 MMHG | WEIGHT: 161.4 LBS | HEART RATE: 103 BPM

## 2021-05-18 DIAGNOSIS — O24.414 INSULIN CONTROLLED GESTATIONAL DIABETES MELLITUS (GDM) IN THIRD TRIMESTER: ICD-10-CM

## 2021-05-18 DIAGNOSIS — O24.419 GESTATIONAL DIABETES MELLITUS (GDM) AFFECTING PREGNANCY, ANTEPARTUM: ICD-10-CM

## 2021-05-18 DIAGNOSIS — Z34.83 PRENATAL CARE, SUBSEQUENT PREGNANCY IN THIRD TRIMESTER: Primary | ICD-10-CM

## 2021-05-18 DIAGNOSIS — O24.414 WHITE CLASSIFICATION A2 GESTATIONAL DIABETES MELLITUS (GDM), INSULIN CONTROLLED: ICD-10-CM

## 2021-05-18 DIAGNOSIS — O24.919 DIABETES MELLITUS AFFECTING PREGNANCY, ANTEPARTUM: ICD-10-CM

## 2021-05-18 DIAGNOSIS — Z30.2 REQUEST FOR STERILIZATION: ICD-10-CM

## 2021-05-18 DIAGNOSIS — Z98.891 HISTORY OF C-SECTION: ICD-10-CM

## 2021-05-18 DIAGNOSIS — O24.113 PRE-EXISTING TYPE 2 DIABETES MELLITUS DURING PREGNANCY IN THIRD TRIMESTER: Primary | ICD-10-CM

## 2021-05-18 PROCEDURE — 76819 FETAL BIOPHYS PROFIL W/O NST: CPT

## 2021-05-18 PROCEDURE — 76819 FETAL BIOPHYS PROFIL W/O NST: CPT | Mod: 26 | Performed by: OBSTETRICS & GYNECOLOGY

## 2021-05-18 PROCEDURE — 99207 PR PRENATAL VISIT: CPT | Performed by: OBSTETRICS & GYNECOLOGY

## 2021-05-18 RX ORDER — HUMAN INSULIN 100 [IU]/ML
INJECTION, SUSPENSION SUBCUTANEOUS
Qty: 20 ML | Refills: 3 | Status: SHIPPED | OUTPATIENT
Start: 2021-05-18 | End: 2021-06-17

## 2021-05-18 NOTE — TELEPHONE ENCOUNTER
[address] [place] [address]                                OPERATIVE REPORT    PATIENT NAME: Lydia Dillard                     :        1975  MED REC NO:   [id number]                           ROOM:  ACCOUNT NO:   [de-identified]                           ADMIT DATE: 2020  PROVIDER:     Kenneth Jackson. REYES Sanches Masters:  2020    OPERATION PERFORMED:  Septoplasty, partial submucous resection of left  inferior turbinate. SURGEON:  Pham Phillips MD    ANESTHESIA:  General endotracheal.    PREOPERATIVE DIAGNOSIS:  Nasal obstruction secondary to nasal septal  deviation with turbinate hypertrophy. POSTOPERATIVE DIAGNOSIS:  Nasal obstruction secondary to nasal septal  deviation with turbinate hypertrophy. HISTORY AND OPERATIVE FINDINGS:  This 53-year-old male who has had a  longstanding nasal obstruction. Septum was deviated massively to the  left with virtually no airway. There was hypertrophy of the right  inferior turbinate. The left inferior turbinate appeared rather  generous on the CT scan, more posteriorly but with correction of the  septum, I suspect there was probably adequate room, and I deferred the  SMR on the left side if only he actually needed to avoid an overly  patent nasal fossa. OPERATIVE PROCEDURE:  After adequate level of general endotracheal  anesthesia had been obtained, the patient's face was prepped and draped  in aseptic fashion. The nose was decongested, vasoconstricted, and  anesthetized in the usual manner for nasal surgery. Partial submucosal  resection of the right inferior turbinate was performed through an  anterior entry point, treating the entire medial surface to the right  inferior turbinate. Turbinate was fractured laterally and three  cottonoids impregnated with Afrin were placed. Attention was turned to the septoplasty.   Right hemitransfixion incision  was Gestational Diabetes Follow-up    Subjective/Objective:    Kelsea Adams sent in blood glucose log for review. Last date of communication was: 5-13-21.    Gestational diabetes is being managed with diet, activity and medications    Taking diabetes medications:   yes:     Diabetes Medication(s)     Insulin       insulin NPH (NOVOLIN N VIAL) 100 UNIT/ML vial    18 units before breakfast AND 16 units before bedtime. TDD up to 40 units. May dispense Humulin N Vial if preferred. Dose update for next fill.          Estimated Date of Delivery: Jul 4, 2021    BG/Food Log:       Assessment:    Ketones: negative.   Fasting blood glucoses: 40% in target.  After breakfast: 75% in target.  Before lunch: 100% in target.  After lunch: 33% in target.  Before dinner: 50% in target.  After dinner: 33% in target.    May need to consider meal time insulin at lunch and dinner, but first will try an increase to the morning NPH dose. Will also increase bedtime NPH to help with fasting BG's.    Plan/Response:  Continue to check BG 6 times daily (before and one hour after each meal).  Recommend increase to insulin - NPH 18-0-0-16 to 22-0-0-18.  Follow-up on Monday.  Invodo message sent.    Christina Gautam RN, [facility]      Any diabetes medication dose changes were made via the CDE Protocol and Collaborative Practice Agreement with the patient's OB/GYN provider. A copy of this encounter was shared with the provider.

## 2021-05-18 NOTE — PROGRESS NOTES
"Please see \"Imaging\" tab under \"Chart Review\" for details of today's US.    Ebonie Fox, DO    "

## 2021-05-18 NOTE — PROGRESS NOTES
Doing well.   No complaints. Reports that her postprandial glucose levels are elevated and she intends to send the values to the diabetic educator/dietician for further adjustment on insulin dosing.   Denies VB, ctx, LOF. +FM  /82 (BP Location: Left arm, Cuff Size: Adult Regular)   Pulse 103   Wt 73.2 kg (161 lb 6.4 oz)   LMP 2020 (Exact Date)   Breastfeeding No   BMI 29.52 kg/m    General Appearance: NAD  Abdomen: Gravid, NT  Refer to flow sheet above.   A/P: 35 year old  at 33w2d  -- GDMA2 (possible pre-existing DM given that failure of early 1 hr GCT): ongoing  ultrasound surveillance q4 week  serial growth ultrasounds and twice weekly BPPs; encouraged to remain connected with diabetic educators/dietician for review of blood glucose monitoring and management of insulin dosing  -- hx of  and desire for sterilization: orders placed and scheduled for 2021  -- PTL precautions reviewed  RTC in 2 weeks for GBS collection and cervix check    Gregoria Kaba MD  [facility]              
25 feet

## 2021-05-18 NOTE — NURSING NOTE
"Chief Complaint   Patient presents with     Prenatal Care     33 weeks 2 days  Twice weekly BPP'S        Initial /82 (BP Location: Left arm, Cuff Size: Adult Regular)   Pulse 103   Wt 73.2 kg (161 lb 6.4 oz)   LMP 2020 (Exact Date)   Breastfeeding No   BMI 29.52 kg/m   Estimated body mass index is 29.52 kg/m  as calculated from the following:    Height as of 21: 1.575 m (5' 2\").    Weight as of this encounter: 73.2 kg (161 lb 6.4 oz).  BP completed using cuff size: regular    Questioned patient about current smoking habits.  Pt. has never smoked.    33w2d      The following HM Due: NONE        +FM Daily   +GDM Insulin  Following with [facility] ED       Saray Navarrete, CMA on 2021 at 1:31 PM         "

## 2021-05-20 ENCOUNTER — MYC MEDICAL ADVICE (OUTPATIENT)
Dept: OBGYN | Facility: CLINIC | Age: 36
End: 2021-05-20

## 2021-05-20 DIAGNOSIS — F41.8 DEPRESSION WITH ANXIETY: Primary | ICD-10-CM

## 2021-05-20 RX ORDER — HYDROXYZINE PAMOATE 25 MG/1
25 CAPSULE ORAL 3 TIMES DAILY PRN
Qty: 30 CAPSULE | Refills: 1 | Status: ON HOLD | OUTPATIENT
Start: 2021-05-20 | End: 2021-06-23

## 2021-05-20 NOTE — TELEPHONE ENCOUNTER
Pt messaging that her anxiety is worsening.  The Vistaril makes her very sleepy and is wondering if there is an alternative.    Please advise.    Regi Sawyer RN

## 2021-05-21 ENCOUNTER — OFFICE VISIT (OUTPATIENT)
Dept: MATERNAL FETAL MEDICINE | Facility: CLINIC | Age: 36
End: 2021-05-21
Attending: OBSTETRICS & GYNECOLOGY
Payer: COMMERCIAL

## 2021-05-21 ENCOUNTER — HOSPITAL ENCOUNTER (OUTPATIENT)
Dept: ULTRASOUND IMAGING | Facility: CLINIC | Age: 36
End: 2021-05-21
Attending: OBSTETRICS & GYNECOLOGY
Payer: COMMERCIAL

## 2021-05-21 DIAGNOSIS — O24.919 DIABETES MELLITUS AFFECTING PREGNANCY, ANTEPARTUM: ICD-10-CM

## 2021-05-21 DIAGNOSIS — O24.419 GESTATIONAL DIABETES MELLITUS (GDM) AFFECTING PREGNANCY, ANTEPARTUM: Primary | ICD-10-CM

## 2021-05-21 PROCEDURE — 76819 FETAL BIOPHYS PROFIL W/O NST: CPT

## 2021-05-21 PROCEDURE — 76819 FETAL BIOPHYS PROFIL W/O NST: CPT | Mod: 26 | Performed by: OBSTETRICS & GYNECOLOGY

## 2021-05-21 NOTE — PROGRESS NOTES
"Please see \"Imaging\" tab under \"Chart Review\" for details of today's US at the [facility].    Hermann Armendariz MD  Maternal-Fetal Medicine    "

## 2021-05-26 ENCOUNTER — MYC MEDICAL ADVICE (OUTPATIENT)
Dept: EDUCATION SERVICES | Facility: CLINIC | Age: 36
End: 2021-05-26

## 2021-05-26 DIAGNOSIS — O24.419 GESTATIONAL DIABETES MELLITUS (GDM) AFFECTING PREGNANCY, ANTEPARTUM: ICD-10-CM

## 2021-05-26 NOTE — TELEPHONE ENCOUNTER
Gestational Diabetes Follow-up    Subjective/Objective:    Kelsea Adams sent in blood glucose log for review. Last date of communication was: 5/18/2021.    Gestational diabetes is being managed with medications    Taking diabetes medications:   yes:     Diabetes Medication(s)     Insulin       insulin NPH (NOVOLIN N VIAL) 100 UNIT/ML vial    22 units before breakfast AND 18 units before bedtime. TDD up to 50 units. May dispense Humulin N Vial if preferred. Dose update for next fill.          Estimated Date of Delivery: Jul 4, 2021    BG/Food Log:         Assessment:    Ketones: neg.   Fasting blood glucoses: 100% in target.  After breakfast: 100% in target.  Before lunch: 75% in target.  After lunch: 38% in target.  Before dinner: 33% in target.  After dinner: 50% in target.    Quite variable post lunch and dinner numbers - again wondering if she needs meal time insulin with these meals, however both meals were in target for the last two days.     Plan/Response:  Continue to check BG 6 times daily (before and one hour after each meal).  Focus on only 3 carb servings at lunch, with a 10-15 min walk if able after meal  Follow up 6/2    KEVIN Mix CDE      Any diabetes medication dose changes were made via the CDE Protocol and Collaborative Practice Agreement with the patient's OB/GYN provider. A copy of this encounter was shared with the provider.

## 2021-05-28 ENCOUNTER — HOSPITAL ENCOUNTER (OUTPATIENT)
Dept: ULTRASOUND IMAGING | Facility: CLINIC | Age: 36
End: 2021-05-28
Attending: OBSTETRICS & GYNECOLOGY
Payer: COMMERCIAL

## 2021-05-28 ENCOUNTER — OFFICE VISIT (OUTPATIENT)
Dept: MATERNAL FETAL MEDICINE | Facility: CLINIC | Age: 36
End: 2021-05-28
Attending: OBSTETRICS & GYNECOLOGY
Payer: COMMERCIAL

## 2021-05-28 DIAGNOSIS — O24.919 DIABETES MELLITUS AFFECTING PREGNANCY, ANTEPARTUM: ICD-10-CM

## 2021-05-28 DIAGNOSIS — O24.419 GESTATIONAL DIABETES MELLITUS (GDM) AFFECTING PREGNANCY, ANTEPARTUM: Primary | ICD-10-CM

## 2021-05-28 PROCEDURE — 76819 FETAL BIOPHYS PROFIL W/O NST: CPT

## 2021-05-28 PROCEDURE — 76819 FETAL BIOPHYS PROFIL W/O NST: CPT | Mod: 26 | Performed by: OBSTETRICS & GYNECOLOGY

## 2021-05-28 NOTE — PROGRESS NOTES
The patient was seen for an ultrasound in the Maternal-Fetal Medicine Center at the [facility] today.  For a detailed report of the ultrasound examination, please see the ultrasound report which can be found under the imaging tab.    Jackelin Domingo MD  , OB/GYN  Maternal-Fetal Medicine  [phone] (Pager)

## 2021-05-31 DIAGNOSIS — Z11.59 ENCOUNTER FOR SCREENING FOR OTHER VIRAL DISEASES: ICD-10-CM

## 2021-06-01 ENCOUNTER — OFFICE VISIT (OUTPATIENT)
Dept: MATERNAL FETAL MEDICINE | Facility: CLINIC | Age: 36
End: 2021-06-01
Attending: OBSTETRICS & GYNECOLOGY
Payer: COMMERCIAL

## 2021-06-01 ENCOUNTER — HOSPITAL ENCOUNTER (OUTPATIENT)
Dept: ULTRASOUND IMAGING | Facility: CLINIC | Age: 36
End: 2021-06-01
Attending: OBSTETRICS & GYNECOLOGY
Payer: COMMERCIAL

## 2021-06-01 DIAGNOSIS — O24.113 PRE-EXISTING TYPE 2 DIABETES MELLITUS DURING PREGNANCY IN THIRD TRIMESTER: Primary | ICD-10-CM

## 2021-06-01 DIAGNOSIS — O24.919 DIABETES MELLITUS AFFECTING PREGNANCY, ANTEPARTUM: ICD-10-CM

## 2021-06-01 PROCEDURE — 76819 FETAL BIOPHYS PROFIL W/O NST: CPT

## 2021-06-01 PROCEDURE — 76819 FETAL BIOPHYS PROFIL W/O NST: CPT | Mod: 26 | Performed by: OBSTETRICS & GYNECOLOGY

## 2021-06-01 PROCEDURE — 76816 OB US FOLLOW-UP PER FETUS: CPT

## 2021-06-01 PROCEDURE — 76816 OB US FOLLOW-UP PER FETUS: CPT | Mod: 26 | Performed by: OBSTETRICS & GYNECOLOGY

## 2021-06-01 NOTE — PROGRESS NOTES
"Please see \"Imaging\" tab under \"Chart Review\" for details of today's US at the [facility].    Hermann Armendariz MD  Maternal-Fetal Medicine    "

## 2021-06-02 ENCOUNTER — MYC MEDICAL ADVICE (OUTPATIENT)
Dept: EDUCATION SERVICES | Facility: CLINIC | Age: 36
End: 2021-06-02

## 2021-06-02 DIAGNOSIS — O24.419 GESTATIONAL DIABETES MELLITUS (GDM) AFFECTING PREGNANCY, ANTEPARTUM: ICD-10-CM

## 2021-06-02 NOTE — TELEPHONE ENCOUNTER
Gestational Diabetes Follow-up    Subjective/Objective:    Kelsea Adams sent in blood glucose log for review. Last date of communication was: 5/26/2021.    Gestational diabetes is being managed with diet, activity and medications    Taking diabetes medications:   yes:     Diabetes Medication(s)     Insulin       insulin NPH (NOVOLIN N VIAL) 100 UNIT/ML vial    22 units before breakfast AND 18 units before bedtime. TDD up to 50 units. May dispense Humulin N Vial if preferred. Dose update for next fill.          Estimated Date of Delivery: Jul 4, 2021    BG/Food Log:         Assessment:    Ketones: neg.   Fasting blood glucoses: 83% in target.  After breakfast: 80% in target.  Before lunch: 66% in target.  After lunch: 100% in target.  Before dinner: 0% in target.  After dinner: 100% in target.    Plan/Response:  No changes in the patient's current treatment plan.  Follow-up on Monday.    KEVIN Mix CDCES    Any diabetes medication dose changes were made via the CDE Protocol and Collaborative Practice Agreement with the patient's OB/GYN provider. A copy of this encounter was shared with the provider.

## 2021-06-04 ENCOUNTER — OFFICE VISIT (OUTPATIENT)
Dept: MATERNAL FETAL MEDICINE | Facility: CLINIC | Age: 36
End: 2021-06-04
Attending: OBSTETRICS & GYNECOLOGY
Payer: COMMERCIAL

## 2021-06-04 ENCOUNTER — HOSPITAL ENCOUNTER (OUTPATIENT)
Dept: ULTRASOUND IMAGING | Facility: CLINIC | Age: 36
End: 2021-06-04
Attending: OBSTETRICS & GYNECOLOGY
Payer: COMMERCIAL

## 2021-06-04 ENCOUNTER — PRENATAL OFFICE VISIT (OUTPATIENT)
Dept: OBGYN | Facility: CLINIC | Age: 36
End: 2021-06-04
Payer: COMMERCIAL

## 2021-06-04 VITALS
DIASTOLIC BLOOD PRESSURE: 82 MMHG | WEIGHT: 164.7 LBS | BODY MASS INDEX: 30.12 KG/M2 | SYSTOLIC BLOOD PRESSURE: 122 MMHG | HEART RATE: 88 BPM

## 2021-06-04 DIAGNOSIS — O24.919 DIABETES MELLITUS AFFECTING PREGNANCY, ANTEPARTUM: ICD-10-CM

## 2021-06-04 DIAGNOSIS — O24.113 TYPE 2 DIABETES MELLITUS IN PREGNANCY, THIRD TRIMESTER: Primary | ICD-10-CM

## 2021-06-04 DIAGNOSIS — O24.414 INSULIN CONTROLLED GESTATIONAL DIABETES MELLITUS (GDM) IN THIRD TRIMESTER: ICD-10-CM

## 2021-06-04 DIAGNOSIS — Z34.83 PRENATAL CARE, SUBSEQUENT PREGNANCY IN THIRD TRIMESTER: Primary | ICD-10-CM

## 2021-06-04 PROCEDURE — 76819 FETAL BIOPHYS PROFIL W/O NST: CPT | Mod: 26 | Performed by: OBSTETRICS & GYNECOLOGY

## 2021-06-04 PROCEDURE — 87653 STREP B DNA AMP PROBE: CPT | Performed by: OBSTETRICS & GYNECOLOGY

## 2021-06-04 PROCEDURE — 99207 PR PRENATAL VISIT: CPT | Performed by: OBSTETRICS & GYNECOLOGY

## 2021-06-04 PROCEDURE — 76819 FETAL BIOPHYS PROFIL W/O NST: CPT

## 2021-06-04 NOTE — PROGRESS NOTES
Doing well. Denies VB, ctx, LOF. +FM  /82 (BP Location: Left arm, Cuff Size: Adult Regular)   Pulse 88   Wt 74.7 kg (164 lb 11.2 oz)   LMP 2020 (Exact Date)   Breastfeeding No   BMI 30.12 kg/m    General Appearance: NAD  Abdomen: Gravid, NT  Refer to flow sheet above.   A/P: 35 year old  at 35w5d  -- GBS collected today   -- GDMA2 (possible pre-existing DM given that failure of early 1 hr GCT): ongoing  ultrasound surveillance q4 week  serial growth ultrasounds and twice weekly BPPs; encouraged to remain connected with diabetic educators/dietician for review of blood glucose monitoring and management of insulin dosing  -- hx of  and desire for sterilization: orders placed and scheduled for 2021  -- PTL precautions reviewed    Gregoria Kaba MD  [facility]

## 2021-06-04 NOTE — PROGRESS NOTES
The patient was seen for an ultrasound in the Maternal-Fetal Medicine Center at the [facility] today.  For a detailed report of the ultrasound examination, please see the ultrasound report which can be found under the imaging tab.    Jackelin Domingo MD  , OB/GYN  Maternal-Fetal Medicine  [phone] (Pager)

## 2021-06-04 NOTE — NURSING NOTE
"Chief Complaint   Patient presents with     Prenatal Care     35 weeks 5 days  GBS=Due   C/S 2021       Initial /82 (BP Location: Left arm, Cuff Size: Adult Regular)   Pulse 88   Wt 74.7 kg (164 lb 11.2 oz)   LMP 2020 (Exact Date)   Breastfeeding No   BMI 30.12 kg/m   Estimated body mass index is 30.12 kg/m  as calculated from the following:    Height as of 21: 1.575 m (5' 2\").    Weight as of this encounter: 74.7 kg (164 lb 11.2 oz).  BP completed using cuff size: regular    Questioned patient about current smoking habits.  Pt. quit smoking some time ago.    35w5d      The following HM Due: NONE      +FM Daily   +GBS Due       Saray Navarrete, CMA on 2021 at 2:26 PM                 "

## 2021-06-05 LAB
GP B STREP DNA SPEC QL NAA+PROBE: POSITIVE
SPECIMEN SOURCE: ABNORMAL

## 2021-06-08 ENCOUNTER — OFFICE VISIT (OUTPATIENT)
Dept: MATERNAL FETAL MEDICINE | Facility: CLINIC | Age: 36
End: 2021-06-08
Attending: OBSTETRICS & GYNECOLOGY
Payer: COMMERCIAL

## 2021-06-08 ENCOUNTER — HOSPITAL ENCOUNTER (OUTPATIENT)
Dept: ULTRASOUND IMAGING | Facility: CLINIC | Age: 36
End: 2021-06-08
Attending: OBSTETRICS & GYNECOLOGY
Payer: COMMERCIAL

## 2021-06-08 DIAGNOSIS — O24.113 TYPE 2 DIABETES MELLITUS IN PREGNANCY, THIRD TRIMESTER: Primary | ICD-10-CM

## 2021-06-08 DIAGNOSIS — O24.919 DIABETES MELLITUS AFFECTING PREGNANCY, ANTEPARTUM: ICD-10-CM

## 2021-06-08 PROCEDURE — 76819 FETAL BIOPHYS PROFIL W/O NST: CPT

## 2021-06-08 PROCEDURE — 76819 FETAL BIOPHYS PROFIL W/O NST: CPT | Mod: 26 | Performed by: OBSTETRICS & GYNECOLOGY

## 2021-06-08 NOTE — PROGRESS NOTES
Please refer to ultrasound report under 'Imaging' Studies of 'Chart Review' tabs.    Tyson Padilla M.D.

## 2021-06-09 ENCOUNTER — MYC MEDICAL ADVICE (OUTPATIENT)
Dept: EDUCATION SERVICES | Facility: CLINIC | Age: 36
End: 2021-06-09

## 2021-06-09 DIAGNOSIS — O24.419 GESTATIONAL DIABETES MELLITUS (GDM) AFFECTING PREGNANCY, ANTEPARTUM: ICD-10-CM

## 2021-06-09 NOTE — TELEPHONE ENCOUNTER
Gestational Diabetes Follow-up    Subjective/Objective:    Kelsea Adams sent in blood glucose log for review. Last date of communication was: 6/2/2021.    Gestational diabetes is being managed with diet, activity and medications    Taking diabetes medications:   yes:     Diabetes Medication(s)     Insulin       insulin NPH (NOVOLIN N VIAL) 100 UNIT/ML vial    22 units before breakfast AND 18 units before bedtime. TDD up to 50 units. May dispense Humulin N Vial if preferred. Dose update for next fill.          Estimated Date of Delivery: Jul 4, 2021    BG/Food Log:       Assessment:    Ketones: neg.   Fasting blood glucoses: 85% in target.  After breakfast: 100% in target.  Before lunch: 50% in target.  After lunch: 100% in target.  Before dinner: 66% in target.  After dinner: 75% in target.    Plan/Response:  No changes in the patient's current treatment plan.  Follow-up in 1 week.    KEVIN Mix CDCES    Any diabetes medication dose changes were made via the CDE Protocol and Collaborative Practice Agreement with the patient's OB/GYN provider. A copy of this encounter was shared with the provider.

## 2021-06-10 ENCOUNTER — PRENATAL OFFICE VISIT (OUTPATIENT)
Dept: OBGYN | Facility: CLINIC | Age: 36
End: 2021-06-10
Attending: OBSTETRICS & GYNECOLOGY
Payer: COMMERCIAL

## 2021-06-10 VITALS — DIASTOLIC BLOOD PRESSURE: 78 MMHG | SYSTOLIC BLOOD PRESSURE: 110 MMHG | BODY MASS INDEX: 30 KG/M2 | WEIGHT: 164 LBS

## 2021-06-10 DIAGNOSIS — Z34.83 PRENATAL CARE, SUBSEQUENT PREGNANCY IN THIRD TRIMESTER: Primary | ICD-10-CM

## 2021-06-10 DIAGNOSIS — Z98.891 HISTORY OF C-SECTION: ICD-10-CM

## 2021-06-10 DIAGNOSIS — O24.414 INSULIN CONTROLLED GESTATIONAL DIABETES MELLITUS (GDM) IN THIRD TRIMESTER: ICD-10-CM

## 2021-06-10 PROCEDURE — 99207 PR PRENATAL VISIT: CPT | Performed by: OBSTETRICS & GYNECOLOGY

## 2021-06-10 NOTE — PROGRESS NOTES
Doing well.   No complaints  Denies VB, ctx, LOF. +FM  /78   Wt 74.4 kg (164 lb)   LMP 2020 (Exact Date)   BMI 30.00 kg/m    General Appearance: NAD  Abdomen: Gravid, NT  Refer to flow sheet above.   A/P: 35 year old  at 36w4d  -- reviewed GBS positive status   -- hx of  and desire for sterilization: orders placed and scheduled for 2021  -- GDMA2 (possible pre-existing DM given that failure of early 1 hr GCT): ongoing  ultrasound surveillance q4 week  serial growth ultrasounds and twice weekly BPPs; encouraged to remain connected with diabetic educators/dietician for review of blood glucose monitoring and management of insulin dosing  -- PTL precautions reviewed  RTC in 1 week    Gregoria Kaba MD  [facility]

## 2021-06-10 NOTE — NURSING NOTE
"Chief Complaint   Patient presents with     Prenatal Care     36 3/7 weeks       Initial /78   Wt 74.4 kg (164 lb)   LMP 2020 (Exact Date)   BMI 30.00 kg/m   Estimated body mass index is 30 kg/m  as calculated from the following:    Height as of 21: 1.575 m (5' 2\").    Weight as of this encounter: 74.4 kg (164 lb).  BP completed using cuff size: regular    Questioned patient about current smoking habits.  Pt. has never smoked.          The following HM Due: NONE    +fetal movement  -swelling    Delmy Laguna, CMA    "

## 2021-06-11 ENCOUNTER — OFFICE VISIT (OUTPATIENT)
Dept: MATERNAL FETAL MEDICINE | Facility: CLINIC | Age: 36
End: 2021-06-11
Attending: OBSTETRICS & GYNECOLOGY
Payer: COMMERCIAL

## 2021-06-11 ENCOUNTER — HOSPITAL ENCOUNTER (OUTPATIENT)
Dept: ULTRASOUND IMAGING | Facility: CLINIC | Age: 36
End: 2021-06-11
Attending: OBSTETRICS & GYNECOLOGY
Payer: COMMERCIAL

## 2021-06-11 DIAGNOSIS — O24.113 TYPE 2 DIABETES MELLITUS IN PREGNANCY, THIRD TRIMESTER: Primary | ICD-10-CM

## 2021-06-11 DIAGNOSIS — O24.919 DIABETES MELLITUS AFFECTING PREGNANCY, ANTEPARTUM: ICD-10-CM

## 2021-06-11 PROCEDURE — 76819 FETAL BIOPHYS PROFIL W/O NST: CPT

## 2021-06-11 PROCEDURE — 76819 FETAL BIOPHYS PROFIL W/O NST: CPT | Mod: 26 | Performed by: OBSTETRICS & GYNECOLOGY

## 2021-06-18 ENCOUNTER — PRENATAL OFFICE VISIT (OUTPATIENT)
Dept: OBGYN | Facility: CLINIC | Age: 36
End: 2021-06-18
Payer: COMMERCIAL

## 2021-06-18 VITALS
HEART RATE: 86 BPM | BODY MASS INDEX: 30.12 KG/M2 | WEIGHT: 164.7 LBS | DIASTOLIC BLOOD PRESSURE: 90 MMHG | SYSTOLIC BLOOD PRESSURE: 154 MMHG

## 2021-06-18 DIAGNOSIS — R03.0 ELEVATED BLOOD PRESSURE READING WITHOUT DIAGNOSIS OF HYPERTENSION: ICD-10-CM

## 2021-06-18 DIAGNOSIS — Z34.83 PRENATAL CARE, SUBSEQUENT PREGNANCY IN THIRD TRIMESTER: Primary | ICD-10-CM

## 2021-06-18 DIAGNOSIS — O24.414 INSULIN CONTROLLED GESTATIONAL DIABETES MELLITUS (GDM) IN THIRD TRIMESTER: ICD-10-CM

## 2021-06-18 DIAGNOSIS — O09.299 HX OF PRE-ECLAMPSIA IN PRIOR PREGNANCY, CURRENTLY PREGNANT: ICD-10-CM

## 2021-06-18 LAB
CREAT UR-MCNC: 23 MG/DL
ERYTHROCYTE [DISTWIDTH] IN BLOOD BY AUTOMATED COUNT: 15.3 % (ref 10–15)
HCT VFR BLD AUTO: 38.2 % (ref 35–47)
HGB BLD-MCNC: 12.4 G/DL (ref 11.7–15.7)
MCH RBC QN AUTO: 25.5 PG (ref 26.5–33)
MCHC RBC AUTO-ENTMCNC: 32.5 G/DL (ref 31.5–36.5)
MCV RBC AUTO: 79 FL (ref 78–100)
PLATELET # BLD AUTO: 245 10E9/L (ref 150–450)
PROT UR-MCNC: <0.05 G/L
PROT/CREAT 24H UR: NORMAL G/G CR (ref 0–0.2)
RBC # BLD AUTO: 4.86 10E12/L (ref 3.8–5.2)
WBC # BLD AUTO: 12.9 10E9/L (ref 4–11)

## 2021-06-18 PROCEDURE — 84450 TRANSFERASE (AST) (SGOT): CPT | Performed by: OBSTETRICS & GYNECOLOGY

## 2021-06-18 PROCEDURE — 99207 PR PRENATAL VISIT: CPT | Performed by: OBSTETRICS & GYNECOLOGY

## 2021-06-18 PROCEDURE — 84460 ALANINE AMINO (ALT) (SGPT): CPT | Performed by: OBSTETRICS & GYNECOLOGY

## 2021-06-18 PROCEDURE — 85027 COMPLETE CBC AUTOMATED: CPT | Performed by: OBSTETRICS & GYNECOLOGY

## 2021-06-18 PROCEDURE — 84156 ASSAY OF PROTEIN URINE: CPT | Performed by: OBSTETRICS & GYNECOLOGY

## 2021-06-18 PROCEDURE — 36415 COLL VENOUS BLD VENIPUNCTURE: CPT | Performed by: OBSTETRICS & GYNECOLOGY

## 2021-06-18 PROCEDURE — 82565 ASSAY OF CREATININE: CPT | Performed by: OBSTETRICS & GYNECOLOGY

## 2021-06-18 NOTE — PROGRESS NOTES
Doing well.   Denies headaches, visual changes, RUQ pain.   Denies VB, ctx, LOF. +FM  BP (!) 148/84 (BP Location: Left arm, Cuff Size: Adult Regular)   Pulse 86   Wt 74.7 kg (164 lb 11.2 oz)   LMP 2020 (Exact Date)   Breastfeeding No   BMI 30.12 kg/m     BP (!) 154/90 (BP Location: Left arm, Cuff Size: Adult Regular)   Pulse 86   Wt 74.7 kg (164 lb 11.2 oz)   LMP 2020 (Exact Date)   Breastfeeding No   BMI 30.12 kg/m    General Appearance: NAD  Abdomen: Gravid, NT  Refer to flow sheet above.   A/P: 35 year old  at 37w5d  -- elevated blood pressure readings in the setting of hx of pre-eclampsia in a previous pregnancy: asymptomatic;  HELLP labs ordered; blood pressure cuff DME order provided for blood pressure monitoring at home and blood pressure reading parameters reviewed to return to L&D over the weekend; informed patient that if blood pressure readings are elevated at her next clinic visit, then she meets criteria for gestational hypertension at the minimum which would require delivery sooner than her scheduled delivery via  on ; patient conveys understanding; pre-eclampsia precautions reviewed  -- GDMA2: ongoing blood glucose monitoring and reporting to diabetic educator for insulin dosing adjustments; ongoing  fetal ultrasound surveillance   -- labor precautions reviewed  RTC in 1 week    Gregoria Kaba MD  [facility]

## 2021-06-18 NOTE — NURSING NOTE
"Chief Complaint   Patient presents with     Prenatal Care     37 weeks 5 days  GBS=POS   C/S 2021       Initial BP (!) 148/84 (BP Location: Left arm, Cuff Size: Adult Regular)   Pulse 86   Wt 74.7 kg (164 lb 11.2 oz)   LMP 2020 (Exact Date)   Breastfeeding No   BMI 30.12 kg/m   Estimated body mass index is 30.12 kg/m  as calculated from the following:    Height as of 21: 1.575 m (5' 2\").    Weight as of this encounter: 74.7 kg (164 lb 11.2 oz).  BP completed using cuff size: regular    Questioned patient about current smoking habits.  Pt. quit smoking some time ago.    37w5d      The following HM Due: NONE      +FM daily   +C/S 2021      Saray Navarrete, CMA on 2021 at 2:30 PM             "

## 2021-06-19 LAB
ALT SERPL W P-5'-P-CCNC: 23 U/L (ref 0–50)
AST SERPL W P-5'-P-CCNC: 12 U/L (ref 0–45)
CREAT SERPL-MCNC: 0.66 MG/DL (ref 0.52–1.04)
GFR SERPL CREATININE-BSD FRML MDRD: >90 ML/MIN/{1.73_M2}

## 2021-06-20 ENCOUNTER — ANESTHESIA EVENT (OUTPATIENT)
Dept: OBGYN | Facility: CLINIC | Age: 36
End: 2021-06-20
Payer: COMMERCIAL

## 2021-06-20 ENCOUNTER — ANESTHESIA (OUTPATIENT)
Dept: OBGYN | Facility: CLINIC | Age: 36
End: 2021-06-20
Payer: COMMERCIAL

## 2021-06-20 ENCOUNTER — HOSPITAL ENCOUNTER (INPATIENT)
Facility: CLINIC | Age: 36
LOS: 3 days | Discharge: HOME | End: 2021-06-23
Attending: OBSTETRICS & GYNECOLOGY | Admitting: OBSTETRICS & GYNECOLOGY
Payer: COMMERCIAL

## 2021-06-20 DIAGNOSIS — Z98.891 S/P CESAREAN SECTION: ICD-10-CM

## 2021-06-20 DIAGNOSIS — Z98.891 HISTORY OF C-SECTION: ICD-10-CM

## 2021-06-20 DIAGNOSIS — O24.419 GESTATIONAL DIABETES MELLITUS (GDM) AFFECTING PREGNANCY, ANTEPARTUM: ICD-10-CM

## 2021-06-20 DIAGNOSIS — Z30.2 REQUEST FOR STERILIZATION: ICD-10-CM

## 2021-06-20 DIAGNOSIS — O13.3 GESTATIONAL HYPERTENSION WITHOUT SIGNIFICANT PROTEINURIA IN THIRD TRIMESTER: ICD-10-CM

## 2021-06-20 DIAGNOSIS — O09.899 SUPERVISION OF OTHER HIGH RISK PREGNANCY, ANTEPARTUM: Primary | ICD-10-CM

## 2021-06-20 LAB
ABO + RH BLD: NORMAL
ABO + RH BLD: NORMAL
ALT SERPL W P-5'-P-CCNC: 22 U/L (ref 0–50)
AST SERPL W P-5'-P-CCNC: 13 U/L (ref 0–45)
BLD GP AB SCN SERPL QL: NORMAL
BLOOD BANK CMNT PATIENT-IMP: NORMAL
CREAT SERPL-MCNC: 0.64 MG/DL (ref 0.52–1.04)
CREAT UR-MCNC: 29 MG/DL
ERYTHROCYTE [DISTWIDTH] IN BLOOD BY AUTOMATED COUNT: 14.6 % (ref 10–15)
GFR SERPL CREATININE-BSD FRML MDRD: >90 ML/MIN/{1.73_M2}
GLUCOSE BLDC GLUCOMTR-MCNC: 118 MG/DL (ref 70–99)
GLUCOSE BLDC GLUCOMTR-MCNC: 131 MG/DL (ref 70–99)
GLUCOSE BLDC GLUCOMTR-MCNC: 152 MG/DL (ref 70–99)
GLUCOSE BLDC GLUCOMTR-MCNC: 154 MG/DL (ref 70–99)
HBA1C MFR BLD: 5.5 % (ref 0–5.6)
HCT VFR BLD AUTO: 35.9 % (ref 35–47)
HGB BLD-MCNC: 11.7 G/DL (ref 11.7–15.7)
LABORATORY COMMENT REPORT: NORMAL
MAGNESIUM SERPL-MCNC: 5.9 MG/DL (ref 1.6–2.3)
MCH RBC QN AUTO: 25.4 PG (ref 26.5–33)
MCHC RBC AUTO-ENTMCNC: 32.6 G/DL (ref 31.5–36.5)
MCV RBC AUTO: 78 FL (ref 78–100)
PLATELET # BLD AUTO: 257 10E9/L (ref 150–450)
PROT UR-MCNC: 0.06 G/L
PROT/CREAT 24H UR: 0.21 G/G CR (ref 0–0.2)
RBC # BLD AUTO: 4.61 10E12/L (ref 3.8–5.2)
SARS-COV-2 RNA RESP QL NAA+PROBE: NEGATIVE
SPECIMEN EXP DATE BLD: NORMAL
SPECIMEN SOURCE: NORMAL
T PALLIDUM AB SER QL: NONREACTIVE
WBC # BLD AUTO: 13.3 10E9/L (ref 4–11)

## 2021-06-20 PROCEDURE — 86850 RBC ANTIBODY SCREEN: CPT | Performed by: OBSTETRICS & GYNECOLOGY

## 2021-06-20 PROCEDURE — 250N000011 HC RX IP 250 OP 636: Performed by: NURSE ANESTHETIST, CERTIFIED REGISTERED

## 2021-06-20 PROCEDURE — 86901 BLOOD TYPING SEROLOGIC RH(D): CPT | Performed by: OBSTETRICS & GYNECOLOGY

## 2021-06-20 PROCEDURE — 250N000013 HC RX MED GY IP 250 OP 250 PS 637: Performed by: FAMILY MEDICINE

## 2021-06-20 PROCEDURE — 120N000001 HC R&B MED SURG/OB

## 2021-06-20 PROCEDURE — 84450 TRANSFERASE (AST) (SGOT): CPT | Performed by: OBSTETRICS & GYNECOLOGY

## 2021-06-20 PROCEDURE — 87635 SARS-COV-2 COVID-19 AMP PRB: CPT | Performed by: OBSTETRICS & GYNECOLOGY

## 2021-06-20 PROCEDURE — 0UT70ZZ RESECTION OF BILATERAL FALLOPIAN TUBES, OPEN APPROACH: ICD-10-PCS | Performed by: OBSTETRICS & GYNECOLOGY

## 2021-06-20 PROCEDURE — 88307 TISSUE EXAM BY PATHOLOGIST: CPT | Mod: 26 | Performed by: PATHOLOGY

## 2021-06-20 PROCEDURE — 84460 ALANINE AMINO (ALT) (SGPT): CPT | Performed by: OBSTETRICS & GYNECOLOGY

## 2021-06-20 PROCEDURE — 360N000076 HC SURGERY LEVEL 3, PER MIN: Performed by: OBSTETRICS & GYNECOLOGY

## 2021-06-20 PROCEDURE — 250N000009 HC RX 250: Performed by: ANESTHESIOLOGY

## 2021-06-20 PROCEDURE — 88302 TISSUE EXAM BY PATHOLOGIST: CPT | Mod: TC | Performed by: OBSTETRICS & GYNECOLOGY

## 2021-06-20 PROCEDURE — 250N000009 HC RX 250: Performed by: NURSE ANESTHETIST, CERTIFIED REGISTERED

## 2021-06-20 PROCEDURE — 86780 TREPONEMA PALLIDUM: CPT | Performed by: OBSTETRICS & GYNECOLOGY

## 2021-06-20 PROCEDURE — 36415 COLL VENOUS BLD VENIPUNCTURE: CPT | Performed by: FAMILY MEDICINE

## 2021-06-20 PROCEDURE — 88307 TISSUE EXAM BY PATHOLOGIST: CPT | Mod: TC | Performed by: OBSTETRICS & GYNECOLOGY

## 2021-06-20 PROCEDURE — 83036 HEMOGLOBIN GLYCOSYLATED A1C: CPT | Performed by: OBSTETRICS & GYNECOLOGY

## 2021-06-20 PROCEDURE — 86900 BLOOD TYPING SEROLOGIC ABO: CPT | Performed by: OBSTETRICS & GYNECOLOGY

## 2021-06-20 PROCEDURE — 370N000017 HC ANESTHESIA TECHNICAL FEE, PER MIN: Performed by: OBSTETRICS & GYNECOLOGY

## 2021-06-20 PROCEDURE — 258N000003 HC RX IP 258 OP 636: Performed by: FAMILY MEDICINE

## 2021-06-20 PROCEDURE — 250N000013 HC RX MED GY IP 250 OP 250 PS 637: Performed by: OBSTETRICS & GYNECOLOGY

## 2021-06-20 PROCEDURE — 258N000003 HC RX IP 258 OP 636: Performed by: OBSTETRICS & GYNECOLOGY

## 2021-06-20 PROCEDURE — 250N000011 HC RX IP 250 OP 636: Performed by: OBSTETRICS & GYNECOLOGY

## 2021-06-20 PROCEDURE — 83735 ASSAY OF MAGNESIUM: CPT | Performed by: FAMILY MEDICINE

## 2021-06-20 PROCEDURE — 84156 ASSAY OF PROTEIN URINE: CPT | Performed by: OBSTETRICS & GYNECOLOGY

## 2021-06-20 PROCEDURE — 250N000011 HC RX IP 250 OP 636: Performed by: ANESTHESIOLOGY

## 2021-06-20 PROCEDURE — 250N000009 HC RX 250: Performed by: OBSTETRICS & GYNECOLOGY

## 2021-06-20 PROCEDURE — G0463 HOSPITAL OUTPT CLINIC VISIT: HCPCS

## 2021-06-20 PROCEDURE — 85027 COMPLETE CBC AUTOMATED: CPT | Performed by: OBSTETRICS & GYNECOLOGY

## 2021-06-20 PROCEDURE — 999N001017 HC STATISTIC GLUCOSE BY METER IP

## 2021-06-20 PROCEDURE — 58611 LIGATE OVIDUCT(S) ADD-ON: CPT | Performed by: OBSTETRICS & GYNECOLOGY

## 2021-06-20 PROCEDURE — 88302 TISSUE EXAM BY PATHOLOGIST: CPT | Mod: 26 | Performed by: PATHOLOGY

## 2021-06-20 PROCEDURE — 272N000001 HC OR GENERAL SUPPLY STERILE: Performed by: OBSTETRICS & GYNECOLOGY

## 2021-06-20 PROCEDURE — 710N000009 HC RECOVERY PHASE 1, LEVEL 1, PER MIN: Performed by: OBSTETRICS & GYNECOLOGY

## 2021-06-20 PROCEDURE — 59510 CESAREAN DELIVERY: CPT | Performed by: OBSTETRICS & GYNECOLOGY

## 2021-06-20 PROCEDURE — 82565 ASSAY OF CREATININE: CPT | Performed by: OBSTETRICS & GYNECOLOGY

## 2021-06-20 PROCEDURE — 250N000011 HC RX IP 250 OP 636: Performed by: FAMILY MEDICINE

## 2021-06-20 RX ORDER — AMOXICILLIN 250 MG
1 CAPSULE ORAL 2 TIMES DAILY
Status: DISCONTINUED | OUTPATIENT
Start: 2021-06-20 | End: 2021-06-20

## 2021-06-20 RX ORDER — TRANEXAMIC ACID 10 MG/ML
1 INJECTION, SOLUTION INTRAVENOUS EVERY 30 MIN PRN
Status: DISCONTINUED | OUTPATIENT
Start: 2021-06-20 | End: 2021-06-23 | Stop reason: HOSPADM

## 2021-06-20 RX ORDER — OXYTOCIN/0.9 % SODIUM CHLORIDE 30/500 ML
100 PLASTIC BAG, INJECTION (ML) INTRAVENOUS CONTINUOUS
Status: DISCONTINUED | OUTPATIENT
Start: 2021-06-20 | End: 2021-06-23 | Stop reason: HOSPADM

## 2021-06-20 RX ORDER — DEXTROSE, SODIUM CHLORIDE, SODIUM LACTATE, POTASSIUM CHLORIDE, AND CALCIUM CHLORIDE 5; .6; .31; .03; .02 G/100ML; G/100ML; G/100ML; G/100ML; G/100ML
INJECTION, SOLUTION INTRAVENOUS CONTINUOUS
Status: DISCONTINUED | OUTPATIENT
Start: 2021-06-20 | End: 2021-06-20

## 2021-06-20 RX ORDER — NALOXONE HYDROCHLORIDE 0.4 MG/ML
0.2 INJECTION, SOLUTION INTRAMUSCULAR; INTRAVENOUS; SUBCUTANEOUS
Status: DISCONTINUED | OUTPATIENT
Start: 2021-06-20 | End: 2021-06-20

## 2021-06-20 RX ORDER — LORAZEPAM 2 MG/ML
2 INJECTION INTRAMUSCULAR
Status: DISCONTINUED | OUTPATIENT
Start: 2021-06-20 | End: 2021-06-23 | Stop reason: HOSPADM

## 2021-06-20 RX ORDER — ONDANSETRON 2 MG/ML
4 INJECTION INTRAMUSCULAR; INTRAVENOUS EVERY 6 HOURS PRN
Status: DISCONTINUED | OUTPATIENT
Start: 2021-06-20 | End: 2021-06-20

## 2021-06-20 RX ORDER — MORPHINE SULFATE 1 MG/ML
INJECTION, SOLUTION EPIDURAL; INTRATHECAL; INTRAVENOUS PRN
Status: DISCONTINUED | OUTPATIENT
Start: 2021-06-20 | End: 2021-06-20

## 2021-06-20 RX ORDER — ACETAMINOPHEN 325 MG/1
975 TABLET ORAL ONCE
Status: COMPLETED | OUTPATIENT
Start: 2021-06-20 | End: 2021-06-20

## 2021-06-20 RX ORDER — AMOXICILLIN 250 MG
1-2 CAPSULE ORAL 2 TIMES DAILY
Status: DISCONTINUED | OUTPATIENT
Start: 2021-06-20 | End: 2021-06-23 | Stop reason: HOSPADM

## 2021-06-20 RX ORDER — CALCIUM GLUCONATE 94 MG/ML
1 INJECTION, SOLUTION INTRAVENOUS
Status: DISCONTINUED | OUTPATIENT
Start: 2021-06-20 | End: 2021-06-20 | Stop reason: ALTCHOICE

## 2021-06-20 RX ORDER — OXYCODONE HYDROCHLORIDE 5 MG/1
5 TABLET ORAL EVERY 4 HOURS PRN
Status: DISCONTINUED | OUTPATIENT
Start: 2021-06-20 | End: 2021-06-20

## 2021-06-20 RX ORDER — HYDROCORTISONE 2.5 %
CREAM (GRAM) TOPICAL 3 TIMES DAILY PRN
Status: DISCONTINUED | OUTPATIENT
Start: 2021-06-20 | End: 2021-06-20

## 2021-06-20 RX ORDER — NIFEDIPINE 10 MG/1
10 CAPSULE ORAL
Status: DISCONTINUED | OUTPATIENT
Start: 2021-06-20 | End: 2021-06-23 | Stop reason: HOSPADM

## 2021-06-20 RX ORDER — HYDRALAZINE HYDROCHLORIDE 20 MG/ML
2.5-5 INJECTION INTRAMUSCULAR; INTRAVENOUS EVERY 10 MIN PRN
Status: DISCONTINUED | OUTPATIENT
Start: 2021-06-20 | End: 2021-06-20 | Stop reason: ALTCHOICE

## 2021-06-20 RX ORDER — IBUPROFEN 800 MG/1
800 TABLET, FILM COATED ORAL EVERY 6 HOURS
Status: DISCONTINUED | OUTPATIENT
Start: 2021-06-21 | End: 2021-06-20

## 2021-06-20 RX ORDER — SODIUM CHLORIDE, SODIUM LACTATE, POTASSIUM CHLORIDE, CALCIUM CHLORIDE 600; 310; 30; 20 MG/100ML; MG/100ML; MG/100ML; MG/100ML
INJECTION, SOLUTION INTRAVENOUS CONTINUOUS
Status: DISCONTINUED | OUTPATIENT
Start: 2021-06-20 | End: 2021-06-20 | Stop reason: HOSPADM

## 2021-06-20 RX ORDER — MODIFIED LANOLIN
OINTMENT (GRAM) TOPICAL
Status: DISCONTINUED | OUTPATIENT
Start: 2021-06-20 | End: 2021-06-20

## 2021-06-20 RX ORDER — ONDANSETRON 2 MG/ML
4 INJECTION INTRAMUSCULAR; INTRAVENOUS EVERY 30 MIN PRN
Status: DISCONTINUED | OUTPATIENT
Start: 2021-06-20 | End: 2021-06-20 | Stop reason: ALTCHOICE

## 2021-06-20 RX ORDER — SIMETHICONE 80 MG
80 TABLET,CHEWABLE ORAL 4 TIMES DAILY PRN
Status: DISCONTINUED | OUTPATIENT
Start: 2021-06-20 | End: 2021-06-20

## 2021-06-20 RX ORDER — CEFAZOLIN SODIUM 2 G/100ML
2 INJECTION, SOLUTION INTRAVENOUS
Status: DISCONTINUED | OUTPATIENT
Start: 2021-06-20 | End: 2021-06-20 | Stop reason: HOSPADM

## 2021-06-20 RX ORDER — ONDANSETRON 2 MG/ML
INJECTION INTRAMUSCULAR; INTRAVENOUS PRN
Status: DISCONTINUED | OUTPATIENT
Start: 2021-06-20 | End: 2021-06-20

## 2021-06-20 RX ORDER — BISACODYL 10 MG
10 SUPPOSITORY, RECTAL RECTAL DAILY PRN
Status: DISCONTINUED | OUTPATIENT
Start: 2021-06-22 | End: 2021-06-20

## 2021-06-20 RX ORDER — SCOLOPAMINE TRANSDERMAL SYSTEM 1 MG/1
1 PATCH, EXTENDED RELEASE TRANSDERMAL ONCE
Status: DISCONTINUED | OUTPATIENT
Start: 2021-06-20 | End: 2021-06-20

## 2021-06-20 RX ORDER — CEFAZOLIN SODIUM 1 G/3ML
1 INJECTION, POWDER, FOR SOLUTION INTRAMUSCULAR; INTRAVENOUS SEE ADMIN INSTRUCTIONS
Status: DISCONTINUED | OUTPATIENT
Start: 2021-06-20 | End: 2021-06-20 | Stop reason: HOSPADM

## 2021-06-20 RX ORDER — NALBUPHINE HYDROCHLORIDE 10 MG/ML
10 INJECTION, SOLUTION INTRAMUSCULAR; INTRAVENOUS; SUBCUTANEOUS EVERY 4 HOURS PRN
Status: DISCONTINUED | OUTPATIENT
Start: 2021-06-20 | End: 2021-06-23 | Stop reason: HOSPADM

## 2021-06-20 RX ORDER — OXYTOCIN/0.9 % SODIUM CHLORIDE 30/500 ML
100 PLASTIC BAG, INJECTION (ML) INTRAVENOUS CONTINUOUS
Status: DISCONTINUED | OUTPATIENT
Start: 2021-06-20 | End: 2021-06-20

## 2021-06-20 RX ORDER — ACETAMINOPHEN 325 MG/1
650 TABLET ORAL ONCE
Status: DISCONTINUED | OUTPATIENT
Start: 2021-06-20 | End: 2021-06-20 | Stop reason: ALTCHOICE

## 2021-06-20 RX ORDER — DEXTROSE MONOHYDRATE 25 G/50ML
25-50 INJECTION, SOLUTION INTRAVENOUS
Status: DISCONTINUED | OUTPATIENT
Start: 2021-06-20 | End: 2021-06-23

## 2021-06-20 RX ORDER — TRANEXAMIC ACID 10 MG/ML
1 INJECTION, SOLUTION INTRAVENOUS EVERY 30 MIN PRN
Status: DISCONTINUED | OUTPATIENT
Start: 2021-06-20 | End: 2021-06-20

## 2021-06-20 RX ORDER — NICOTINE POLACRILEX 4 MG
15-30 LOZENGE BUCCAL
Status: DISCONTINUED | OUTPATIENT
Start: 2021-06-20 | End: 2021-06-23 | Stop reason: HOSPADM

## 2021-06-20 RX ORDER — NALOXONE HYDROCHLORIDE 0.4 MG/ML
0.4 INJECTION, SOLUTION INTRAMUSCULAR; INTRAVENOUS; SUBCUTANEOUS
Status: DISCONTINUED | OUTPATIENT
Start: 2021-06-20 | End: 2021-06-20

## 2021-06-20 RX ORDER — NIFEDIPINE 10 MG/1
10 CAPSULE ORAL
Status: DISCONTINUED | OUTPATIENT
Start: 2021-06-20 | End: 2021-06-20 | Stop reason: ALTCHOICE

## 2021-06-20 RX ORDER — MAGNESIUM SULFATE HEPTAHYDRATE 40 MG/ML
2 INJECTION, SOLUTION INTRAVENOUS
Status: DISCONTINUED | OUTPATIENT
Start: 2021-06-20 | End: 2021-06-20 | Stop reason: ALTCHOICE

## 2021-06-20 RX ORDER — LORAZEPAM 2 MG/ML
2 INJECTION INTRAMUSCULAR
Status: DISCONTINUED | OUTPATIENT
Start: 2021-06-20 | End: 2021-06-20 | Stop reason: ALTCHOICE

## 2021-06-20 RX ORDER — NICOTINE POLACRILEX 4 MG
15-30 LOZENGE BUCCAL
Status: DISCONTINUED | OUTPATIENT
Start: 2021-06-20 | End: 2021-06-23

## 2021-06-20 RX ORDER — SODIUM CHLORIDE, SODIUM LACTATE, POTASSIUM CHLORIDE, CALCIUM CHLORIDE 600; 310; 30; 20 MG/100ML; MG/100ML; MG/100ML; MG/100ML
INJECTION, SOLUTION INTRAVENOUS CONTINUOUS
Status: DISCONTINUED | OUTPATIENT
Start: 2021-06-20 | End: 2021-06-20 | Stop reason: CLARIF

## 2021-06-20 RX ORDER — MAGNESIUM SULFATE HEPTAHYDRATE 500 MG/ML
4 INJECTION, SOLUTION INTRAMUSCULAR; INTRAVENOUS
Status: DISCONTINUED | OUTPATIENT
Start: 2021-06-20 | End: 2021-06-23 | Stop reason: HOSPADM

## 2021-06-20 RX ORDER — MEPERIDINE HYDROCHLORIDE 25 MG/ML
12.5 INJECTION INTRAMUSCULAR; INTRAVENOUS; SUBCUTANEOUS
Status: DISCONTINUED | OUTPATIENT
Start: 2021-06-20 | End: 2021-06-20 | Stop reason: CLARIF

## 2021-06-20 RX ORDER — MAGNESIUM SULFATE HEPTAHYDRATE 40 MG/ML
2 INJECTION, SOLUTION INTRAVENOUS
Status: DISCONTINUED | OUTPATIENT
Start: 2021-06-20 | End: 2021-06-23 | Stop reason: HOSPADM

## 2021-06-20 RX ORDER — OXYCODONE HYDROCHLORIDE 5 MG/1
5 TABLET ORAL EVERY 4 HOURS PRN
Status: DISCONTINUED | OUTPATIENT
Start: 2021-06-20 | End: 2021-06-20 | Stop reason: ALTCHOICE

## 2021-06-20 RX ORDER — DEXTROSE MONOHYDRATE 25 G/50ML
25-50 INJECTION, SOLUTION INTRAVENOUS
Status: DISCONTINUED | OUTPATIENT
Start: 2021-06-20 | End: 2021-06-23 | Stop reason: HOSPADM

## 2021-06-20 RX ORDER — SODIUM CHLORIDE, SODIUM LACTATE, POTASSIUM CHLORIDE, CALCIUM CHLORIDE 600; 310; 30; 20 MG/100ML; MG/100ML; MG/100ML; MG/100ML
10-125 INJECTION, SOLUTION INTRAVENOUS CONTINUOUS
Status: DISCONTINUED | OUTPATIENT
Start: 2021-06-20 | End: 2021-06-22

## 2021-06-20 RX ORDER — FENTANYL CITRATE 50 UG/ML
INJECTION, SOLUTION INTRAMUSCULAR; INTRAVENOUS PRN
Status: DISCONTINUED | OUTPATIENT
Start: 2021-06-20 | End: 2021-06-20

## 2021-06-20 RX ORDER — ACETAMINOPHEN 325 MG/1
975 TABLET ORAL EVERY 6 HOURS
Status: DISCONTINUED | OUTPATIENT
Start: 2021-06-20 | End: 2021-06-23 | Stop reason: HOSPADM

## 2021-06-20 RX ORDER — NICOTINE POLACRILEX 4 MG
15-30 LOZENGE BUCCAL
Status: DISCONTINUED | OUTPATIENT
Start: 2021-06-20 | End: 2021-06-20

## 2021-06-20 RX ORDER — AZITHROMYCIN 500 MG/5ML
500 INJECTION, POWDER, LYOPHILIZED, FOR SOLUTION INTRAVENOUS
Status: COMPLETED | OUTPATIENT
Start: 2021-06-20 | End: 2021-06-20

## 2021-06-20 RX ORDER — AMOXICILLIN 250 MG
1 CAPSULE ORAL 2 TIMES DAILY
Status: DISCONTINUED | OUTPATIENT
Start: 2021-06-20 | End: 2021-06-23 | Stop reason: HOSPADM

## 2021-06-20 RX ORDER — SODIUM CHLORIDE, SODIUM LACTATE, POTASSIUM CHLORIDE, CALCIUM CHLORIDE 600; 310; 30; 20 MG/100ML; MG/100ML; MG/100ML; MG/100ML
10-125 INJECTION, SOLUTION INTRAVENOUS CONTINUOUS
Status: DISCONTINUED | OUTPATIENT
Start: 2021-06-20 | End: 2021-06-20

## 2021-06-20 RX ORDER — CITRIC ACID/SODIUM CITRATE 334-500MG
30 SOLUTION, ORAL ORAL
Status: COMPLETED | OUTPATIENT
Start: 2021-06-20 | End: 2021-06-20

## 2021-06-20 RX ORDER — MAGNESIUM SULFATE IN WATER 40 MG/ML
2 INJECTION, SOLUTION INTRAVENOUS CONTINUOUS
Status: DISCONTINUED | OUTPATIENT
Start: 2021-06-20 | End: 2021-06-20

## 2021-06-20 RX ORDER — HYDRALAZINE HYDROCHLORIDE 20 MG/ML
10 INJECTION INTRAMUSCULAR; INTRAVENOUS
Status: DISCONTINUED | OUTPATIENT
Start: 2021-06-20 | End: 2021-06-20

## 2021-06-20 RX ORDER — CALCIUM GLUCONATE 94 MG/ML
1 INJECTION, SOLUTION INTRAVENOUS
Status: DISCONTINUED | OUTPATIENT
Start: 2021-06-20 | End: 2021-06-22

## 2021-06-20 RX ORDER — KETOROLAC TROMETHAMINE 30 MG/ML
30 INJECTION, SOLUTION INTRAMUSCULAR; INTRAVENOUS EVERY 6 HOURS
Status: COMPLETED | OUTPATIENT
Start: 2021-06-20 | End: 2021-06-21

## 2021-06-20 RX ORDER — DIPHENHYDRAMINE HYDROCHLORIDE 50 MG/ML
INJECTION INTRAMUSCULAR; INTRAVENOUS PRN
Status: DISCONTINUED | OUTPATIENT
Start: 2021-06-20 | End: 2021-06-20

## 2021-06-20 RX ORDER — MAGNESIUM SULFATE HEPTAHYDRATE 500 MG/ML
4 INJECTION, SOLUTION INTRAMUSCULAR; INTRAVENOUS
Status: DISCONTINUED | OUTPATIENT
Start: 2021-06-20 | End: 2021-06-20 | Stop reason: ALTCHOICE

## 2021-06-20 RX ORDER — ALBUTEROL SULFATE 0.83 MG/ML
2.5 SOLUTION RESPIRATORY (INHALATION) EVERY 4 HOURS PRN
Status: DISCONTINUED | OUTPATIENT
Start: 2021-06-20 | End: 2021-06-20 | Stop reason: CLARIF

## 2021-06-20 RX ORDER — KETOROLAC TROMETHAMINE 30 MG/ML
INJECTION, SOLUTION INTRAMUSCULAR; INTRAVENOUS PRN
Status: DISCONTINUED | OUTPATIENT
Start: 2021-06-20 | End: 2021-06-20

## 2021-06-20 RX ORDER — AMOXICILLIN 250 MG
2 CAPSULE ORAL 2 TIMES DAILY
Status: DISCONTINUED | OUTPATIENT
Start: 2021-06-20 | End: 2021-06-20

## 2021-06-20 RX ORDER — CITRIC ACID/SODIUM CITRATE 334-500MG
30 SOLUTION, ORAL ORAL
Status: DISCONTINUED | OUTPATIENT
Start: 2021-06-20 | End: 2021-06-20

## 2021-06-20 RX ORDER — LIDOCAINE 40 MG/G
CREAM TOPICAL
Status: DISCONTINUED | OUTPATIENT
Start: 2021-06-20 | End: 2021-06-20

## 2021-06-20 RX ORDER — HYDROMORPHONE HYDROCHLORIDE 1 MG/ML
.3-.5 INJECTION, SOLUTION INTRAMUSCULAR; INTRAVENOUS; SUBCUTANEOUS EVERY 10 MIN PRN
Status: DISCONTINUED | OUTPATIENT
Start: 2021-06-20 | End: 2021-06-20 | Stop reason: CLARIF

## 2021-06-20 RX ORDER — KETOROLAC TROMETHAMINE 30 MG/ML
30 INJECTION, SOLUTION INTRAMUSCULAR; INTRAVENOUS EVERY 6 HOURS
Status: DISCONTINUED | OUTPATIENT
Start: 2021-06-20 | End: 2021-06-20

## 2021-06-20 RX ORDER — OXYTOCIN/0.9 % SODIUM CHLORIDE 30/500 ML
340 PLASTIC BAG, INJECTION (ML) INTRAVENOUS CONTINUOUS PRN
Status: DISCONTINUED | OUTPATIENT
Start: 2021-06-20 | End: 2021-06-20

## 2021-06-20 RX ORDER — OXYTOCIN 10 [USP'U]/ML
10 INJECTION, SOLUTION INTRAMUSCULAR; INTRAVENOUS
Status: DISCONTINUED | OUTPATIENT
Start: 2021-06-20 | End: 2021-06-20

## 2021-06-20 RX ORDER — MAGNESIUM SULFATE IN WATER 40 MG/ML
1 INJECTION, SOLUTION INTRAVENOUS CONTINUOUS
Status: DISPENSED | OUTPATIENT
Start: 2021-06-20 | End: 2021-06-21

## 2021-06-20 RX ORDER — ONDANSETRON 4 MG/1
4 TABLET, ORALLY DISINTEGRATING ORAL EVERY 30 MIN PRN
Status: DISCONTINUED | OUTPATIENT
Start: 2021-06-20 | End: 2021-06-20 | Stop reason: ALTCHOICE

## 2021-06-20 RX ORDER — NALOXONE HYDROCHLORIDE 0.4 MG/ML
0.4 INJECTION, SOLUTION INTRAMUSCULAR; INTRAVENOUS; SUBCUTANEOUS
Status: DISCONTINUED | OUTPATIENT
Start: 2021-06-20 | End: 2021-06-23 | Stop reason: HOSPADM

## 2021-06-20 RX ORDER — CEFAZOLIN SODIUM 2 G/100ML
2 INJECTION, SOLUTION INTRAVENOUS
Status: COMPLETED | OUTPATIENT
Start: 2021-06-20 | End: 2021-06-20

## 2021-06-20 RX ORDER — CITALOPRAM HYDROBROMIDE 10 MG/1
10 TABLET ORAL DAILY
Status: DISCONTINUED | OUTPATIENT
Start: 2021-06-20 | End: 2021-06-23 | Stop reason: HOSPADM

## 2021-06-20 RX ORDER — KETOROLAC TROMETHAMINE 30 MG/ML
30 INJECTION, SOLUTION INTRAMUSCULAR; INTRAVENOUS EVERY 6 HOURS PRN
Status: DISCONTINUED | OUTPATIENT
Start: 2021-06-20 | End: 2021-06-20 | Stop reason: ALTCHOICE

## 2021-06-20 RX ORDER — DEXTROSE MONOHYDRATE 25 G/50ML
25-50 INJECTION, SOLUTION INTRAVENOUS
Status: DISCONTINUED | OUTPATIENT
Start: 2021-06-20 | End: 2021-06-20

## 2021-06-20 RX ORDER — LABETALOL HYDROCHLORIDE 5 MG/ML
10 INJECTION, SOLUTION INTRAVENOUS
Status: DISCONTINUED | OUTPATIENT
Start: 2021-06-20 | End: 2021-06-20 | Stop reason: ALTCHOICE

## 2021-06-20 RX ORDER — OXYCODONE HYDROCHLORIDE 5 MG/1
5-10 TABLET ORAL
Status: DISCONTINUED | OUTPATIENT
Start: 2021-06-20 | End: 2021-06-23 | Stop reason: HOSPADM

## 2021-06-20 RX ORDER — MAGNESIUM SULFATE IN WATER 40 MG/ML
2 INJECTION, SOLUTION INTRAVENOUS CONTINUOUS
Status: DISCONTINUED | OUTPATIENT
Start: 2021-06-20 | End: 2021-06-20 | Stop reason: ALTCHOICE

## 2021-06-20 RX ORDER — NALOXONE HYDROCHLORIDE 0.4 MG/ML
0.2 INJECTION, SOLUTION INTRAMUSCULAR; INTRAVENOUS; SUBCUTANEOUS
Status: DISCONTINUED | OUTPATIENT
Start: 2021-06-20 | End: 2021-06-23 | Stop reason: HOSPADM

## 2021-06-20 RX ORDER — ONDANSETRON 2 MG/ML
4 INJECTION INTRAMUSCULAR; INTRAVENOUS EVERY 4 HOURS PRN
Status: DISCONTINUED | OUTPATIENT
Start: 2021-06-20 | End: 2021-06-20 | Stop reason: ALTCHOICE

## 2021-06-20 RX ORDER — MAGNESIUM SULFATE HEPTAHYDRATE 40 MG/ML
4 INJECTION, SOLUTION INTRAVENOUS ONCE
Status: DISCONTINUED | OUTPATIENT
Start: 2021-06-20 | End: 2021-06-20

## 2021-06-20 RX ORDER — LIDOCAINE 40 MG/G
CREAM TOPICAL
Status: DISCONTINUED | OUTPATIENT
Start: 2021-06-20 | End: 2021-06-23 | Stop reason: HOSPADM

## 2021-06-20 RX ORDER — FENTANYL CITRATE 50 UG/ML
25-50 INJECTION, SOLUTION INTRAMUSCULAR; INTRAVENOUS
Status: DISCONTINUED | OUTPATIENT
Start: 2021-06-20 | End: 2021-06-20 | Stop reason: CLARIF

## 2021-06-20 RX ORDER — MAGNESIUM SULFATE HEPTAHYDRATE 40 MG/ML
4 INJECTION, SOLUTION INTRAVENOUS
Status: DISCONTINUED | OUTPATIENT
Start: 2021-06-20 | End: 2021-06-23 | Stop reason: HOSPADM

## 2021-06-20 RX ORDER — MAGNESIUM SULFATE HEPTAHYDRATE 40 MG/ML
4 INJECTION, SOLUTION INTRAVENOUS ONCE
Status: COMPLETED | OUTPATIENT
Start: 2021-06-20 | End: 2021-06-20

## 2021-06-20 RX ORDER — MAGNESIUM SULFATE HEPTAHYDRATE 40 MG/ML
4 INJECTION, SOLUTION INTRAVENOUS
Status: DISCONTINUED | OUTPATIENT
Start: 2021-06-20 | End: 2021-06-20 | Stop reason: ALTCHOICE

## 2021-06-20 RX ORDER — NALBUPHINE HYDROCHLORIDE 10 MG/ML
2.5-5 INJECTION, SOLUTION INTRAMUSCULAR; INTRAVENOUS; SUBCUTANEOUS EVERY 6 HOURS PRN
Status: DISCONTINUED | OUTPATIENT
Start: 2021-06-20 | End: 2021-06-20

## 2021-06-20 RX ORDER — FENTANYL CITRATE-0.9 % NACL/PF 10 MCG/ML
PLASTIC BAG, INJECTION (ML) INTRAVENOUS CONTINUOUS PRN
Status: DISCONTINUED | OUTPATIENT
Start: 2021-06-20 | End: 2021-06-20

## 2021-06-20 RX ORDER — OXYTOCIN/0.9 % SODIUM CHLORIDE 30/500 ML
PLASTIC BAG, INJECTION (ML) INTRAVENOUS PRN
Status: DISCONTINUED | OUTPATIENT
Start: 2021-06-20 | End: 2021-06-20

## 2021-06-20 RX ORDER — HYDRALAZINE HYDROCHLORIDE 20 MG/ML
5 INJECTION INTRAMUSCULAR; INTRAVENOUS
Status: DISCONTINUED | OUTPATIENT
Start: 2021-06-20 | End: 2021-06-20

## 2021-06-20 RX ORDER — BUPIVACAINE HYDROCHLORIDE 7.5 MG/ML
INJECTION, SOLUTION INTRASPINAL PRN
Status: DISCONTINUED | OUTPATIENT
Start: 2021-06-20 | End: 2021-06-20

## 2021-06-20 RX ORDER — ACETAMINOPHEN 325 MG/1
975 TABLET ORAL EVERY 6 HOURS
Status: DISCONTINUED | OUTPATIENT
Start: 2021-06-20 | End: 2021-06-20

## 2021-06-20 RX ORDER — DEXAMETHASONE SODIUM PHOSPHATE 4 MG/ML
INJECTION, SOLUTION INTRA-ARTICULAR; INTRALESIONAL; INTRAMUSCULAR; INTRAVENOUS; SOFT TISSUE PRN
Status: DISCONTINUED | OUTPATIENT
Start: 2021-06-20 | End: 2021-06-20

## 2021-06-20 RX ADMIN — ACETAMINOPHEN 975 MG: 325 TABLET, FILM COATED ORAL at 17:02

## 2021-06-20 RX ADMIN — SODIUM CHLORIDE, POTASSIUM CHLORIDE, SODIUM LACTATE AND CALCIUM CHLORIDE 75 ML/HR: 600; 310; 30; 20 INJECTION, SOLUTION INTRAVENOUS at 14:24

## 2021-06-20 RX ADMIN — KETOROLAC TROMETHAMINE 30 MG: 30 INJECTION, SOLUTION INTRAMUSCULAR at 11:51

## 2021-06-20 RX ADMIN — DIPHENHYDRAMINE HYDROCHLORIDE 12.5 MG: 50 INJECTION, SOLUTION INTRAMUSCULAR; INTRAVENOUS at 05:19

## 2021-06-20 RX ADMIN — BUPIVACAINE HYDROCHLORIDE IN DEXTROSE 13.5 MG: 7.5 INJECTION, SOLUTION SUBARACHNOID at 04:23

## 2021-06-20 RX ADMIN — FENTANYL CITRATE 20 MCG: 50 INJECTION, SOLUTION INTRAMUSCULAR; INTRAVENOUS at 04:23

## 2021-06-20 RX ADMIN — SODIUM CITRATE AND CITRIC ACID MONOHYDRATE 30 ML: 500; 334 SOLUTION ORAL at 04:06

## 2021-06-20 RX ADMIN — NALBUPHINE HYDROCHLORIDE 2.5 MG: 10 INJECTION, SOLUTION INTRAMUSCULAR; INTRAVENOUS; SUBCUTANEOUS at 08:45

## 2021-06-20 RX ADMIN — OXYTOCIN-SODIUM CHLORIDE 0.9% IV SOLN 30 UNIT/500ML 500 ML: 30-0.9/5 SOLUTION at 04:47

## 2021-06-20 RX ADMIN — Medication 100 ML/HR: at 08:10

## 2021-06-20 RX ADMIN — ACETAMINOPHEN 975 MG: 325 TABLET, FILM COATED ORAL at 22:59

## 2021-06-20 RX ADMIN — KETOROLAC TROMETHAMINE 30 MG: 30 INJECTION, SOLUTION INTRAMUSCULAR at 05:24

## 2021-06-20 RX ADMIN — MAGNESIUM SULFATE HEPTAHYDRATE 4 G: 40 INJECTION, SOLUTION INTRAVENOUS at 09:32

## 2021-06-20 RX ADMIN — MAGNESIUM SULFATE HEPTAHYDRATE 2 G/HR: 40 INJECTION, SOLUTION INTRAVENOUS at 10:36

## 2021-06-20 RX ADMIN — NALBUPHINE HYDROCHLORIDE 10 MG: 10 INJECTION, SOLUTION INTRAMUSCULAR; INTRAVENOUS; SUBCUTANEOUS at 23:56

## 2021-06-20 RX ADMIN — MAGNESIUM SULFATE HEPTAHYDRATE 1 G/HR: 40 INJECTION, SOLUTION INTRAVENOUS at 19:28

## 2021-06-20 RX ADMIN — NALBUPHINE HYDROCHLORIDE 5 MG: 10 INJECTION, SOLUTION INTRAMUSCULAR; INTRAVENOUS; SUBCUTANEOUS at 06:41

## 2021-06-20 RX ADMIN — SODIUM CHLORIDE, POTASSIUM CHLORIDE, SODIUM LACTATE AND CALCIUM CHLORIDE 25 ML/HR: 600; 310; 30; 20 INJECTION, SOLUTION INTRAVENOUS at 09:26

## 2021-06-20 RX ADMIN — NALBUPHINE HYDROCHLORIDE 10 MG: 10 INJECTION, SOLUTION INTRAMUSCULAR; INTRAVENOUS; SUBCUTANEOUS at 17:02

## 2021-06-20 RX ADMIN — Medication 50 MCG/MIN: at 04:23

## 2021-06-20 RX ADMIN — HYDRALAZINE HYDROCHLORIDE 5 MG: 20 INJECTION INTRAMUSCULAR; INTRAVENOUS at 04:12

## 2021-06-20 RX ADMIN — ACETAMINOPHEN 975 MG: 325 TABLET, FILM COATED ORAL at 04:06

## 2021-06-20 RX ADMIN — KETOROLAC TROMETHAMINE 30 MG: 30 INJECTION, SOLUTION INTRAMUSCULAR; INTRAVENOUS at 19:28

## 2021-06-20 RX ADMIN — SODIUM CHLORIDE, POTASSIUM CHLORIDE, SODIUM LACTATE AND CALCIUM CHLORIDE: 600; 310; 30; 20 INJECTION, SOLUTION INTRAVENOUS at 04:25

## 2021-06-20 RX ADMIN — MAGNESIUM SULFATE HEPTAHYDRATE 1 G/HR: 40 INJECTION, SOLUTION INTRAVENOUS at 22:40

## 2021-06-20 RX ADMIN — SCOPALAMINE 1 PATCH: 1 PATCH, EXTENDED RELEASE TRANSDERMAL at 11:50

## 2021-06-20 RX ADMIN — SODIUM CHLORIDE, POTASSIUM CHLORIDE, SODIUM LACTATE AND CALCIUM CHLORIDE 125 ML/HR: 600; 310; 30; 20 INJECTION, SOLUTION INTRAVENOUS at 23:09

## 2021-06-20 RX ADMIN — NIFEDIPINE 10 MG: 10 CAPSULE ORAL at 03:07

## 2021-06-20 RX ADMIN — CEFAZOLIN SODIUM 2 G: 2 INJECTION, SOLUTION INTRAVENOUS at 04:24

## 2021-06-20 RX ADMIN — ONDANSETRON 4 MG: 2 INJECTION INTRAMUSCULAR; INTRAVENOUS at 04:29

## 2021-06-20 RX ADMIN — SODIUM CHLORIDE, POTASSIUM CHLORIDE, SODIUM LACTATE AND CALCIUM CHLORIDE: 600; 310; 30; 20 INJECTION, SOLUTION INTRAVENOUS at 04:00

## 2021-06-20 RX ADMIN — DEXAMETHASONE SODIUM PHOSPHATE 4 MG: 4 INJECTION, SOLUTION INTRA-ARTICULAR; INTRALESIONAL; INTRAMUSCULAR; INTRAVENOUS; SOFT TISSUE at 04:32

## 2021-06-20 RX ADMIN — DOCUSATE SODIUM AND SENNOSIDES 1 TABLET: 8.6; 5 TABLET, FILM COATED ORAL at 22:59

## 2021-06-20 RX ADMIN — CITALOPRAM HYDROBROMIDE 10 MG: 10 TABLET ORAL at 16:15

## 2021-06-20 RX ADMIN — ACETAMINOPHEN 975 MG: 325 TABLET, FILM COATED ORAL at 10:52

## 2021-06-20 RX ADMIN — OXYCODONE HYDROCHLORIDE 5 MG: 5 TABLET ORAL at 21:35

## 2021-06-20 RX ADMIN — Medication 0.5 G: at 04:24

## 2021-06-20 RX ADMIN — MORPHINE SULFATE 0.15 MG: 1 INJECTION EPIDURAL; INTRATHECAL; INTRAVENOUS at 04:23

## 2021-06-20 RX ADMIN — HYDRALAZINE HYDROCHLORIDE 5 MG: 20 INJECTION INTRAMUSCULAR; INTRAVENOUS at 06:29

## 2021-06-20 NOTE — PLAN OF CARE
Data: Kelsea Adams transferred to  424 via cart  at 1300 Baby transferred via bassinet,  Action: Receiving unit notified of transfer: Yes. Patient and family notified of room change. Report given to Najma THOMAS RN at bedside at 1300  Belongings sent to receiving unit. Accompanied by Registered Nurse. Oriented patient to surroundings. Call light within reach. ID bands double-checked with receiving RN.  Response: Patient tolerated transfer and is stable Postop Patient stable on Magnesium, BP stable, patient able to tolerate bites of crackers, apple juice and some warm tea, marin output adequate

## 2021-06-20 NOTE — PROGRESS NOTES
POD #0, Gestational HTN with severe features, will start magnesium sulfate for seizure prophylaxis.   D/w RN and patient.  IF bp elevates, will start anti-hypertensive.   Dr. Dinorah Santacruz, DO    Obstetrics and Gynecology  [facility]

## 2021-06-20 NOTE — H&P
[facility] Labor and Delivery History and Physical    Kelsea Adams MRN# [id number]   Age: 35 year old YOB: 1985     Date of Admission:  2021    Primary care provider: Gregoria Kaba           Chief Complaint:   Kelsea Adams is a 35 year old Black female  Estimated Date of Delivery: 2021  who is 38w0d pregnant and being admitted for elevated BP readings at home earlier this am.  The pt is blood O Rh pos, Rubella immune, Hep B surface antigen neg, HIV non reactive, GBS pos with a past history of a LST C/B at 36.6 weeks for breech in a pregnancy complicated with IUGR, GDM, PPROM gestational hypertension without proteinuria.  The pt has been on ASA 81 mg this pregnancy as well as insulin BID for A2 GDM, with normal PIH labs on 21 and a NIPT 46XX earlier this pregnancy, a hx of mild alpha Thal, and elevated home BP earlier this am with a systolic of 180 without headache, visual changes of RUQ discomfort.  The pt was scheduled for a RCB on 21 with a bilateral salpingectomy (consent signed 4/15/21).  Blood pressures this am in L&D have ranged 161-181/82-90. The pt last ate at 1 am and had brandy bread and goat cheese.   Risks, benefits, and alternative modes of therapy discussed at length. Pathophysiology of the disease process reviewed, all of the patients questions answered and informed consent obtained.  A decision has been made to proceed with a RCB with bilateral salpingectomy.  The risks benefits and alt to perm sterilization including the <100% effectiveness were thoroughly discussed.  I think she has a good understanding and all her quesitons have been answered.            Pregnancy history:     OBSTETRIC HISTORY:    OB History    Para Term  AB Living   2 1 0 1 0 1   SAB TAB Ectopic Multiple Live Births   0 0 0 0 1      # Outcome Date GA Lbr Dru/2nd Weight Sex Delivery Anes PTL Lv   2 Current            1   11/10/16 36w6d  2.54 kg (5 lb 9.6 oz) F CS-LTranv Spinal N TENISHA      Birth Comments: New band: [id number] FDR,  Old band:[id number] FDR      Name: MATTHEW SANDERS      Apgar1: 8  Apgar5: 9       EDC: Estimated Date of Delivery: 21    Prenatal Labs:   Lab Results   Component Value Date    ABO O 12/15/2020    RH Pos 12/15/2020    AS Neg 12/15/2020    HEPBANG Nonreactive 12/15/2020    HGB 12.4 2021       GBS Status:   Lab Results   Component Value Date    GBS Positive (A) 2021       Active Problem List  Patient Active Problem List   Diagnosis     History of      Request for sterilization     Cholecystitis     Abnormal antibody titer     Gestational diabetes mellitus (GDM) affecting pregnancy, antepartum     Gestational hypertension without significant proteinuria in third trimester     Heart palpitations     Supervision of other high risk pregnancy, antepartum     S/P  section     Encounter for triage in pregnant patient       Medication Prior to Admission  Medications Prior to Admission   Medication Sig Dispense Refill Last Dose     aspirin (ASA) 81 MG chewable tablet Take 1 tablet (81 mg) by mouth daily 60 tablet 1 2021 at Unknown time     citalopram (CELEXA) 10 MG tablet Take 1 tablet (10 mg) by mouth daily 90 tablet 1 2021 at Unknown time     hydrOXYzine (VISTARIL) 25 MG capsule Take 1 capsule (25 mg) by mouth 3 times daily as needed for anxiety 30 capsule 1 2021 at Unknown time     insulin NPH (NOVOLIN N VIAL) 100 UNIT/ML vial 24 units before breakfast AND 18 units before bedtime. TDD up to 50 units. May dispense Humulin N Vial if preferred. Dose update for next fill. 20 mL 3 2021 at Unknown time     ACCU-CHEK GUIDE test strip Use to test blood sugar 6 times daily or as directed. 200 strip 4      acetone urine (KETOSTIX) test strip Use 1 strip/day with first morning urine until ketones are negative for 7 days in a row, then use 1 strip/week 100 strip 1      BD  "PEN NEEDLE JOCE 2ND GEN 32G X 4 MM miscellaneous USE TWO DAILY        blood glucose monitoring (SOFTCLIX) lancets Use to test blood sugar 4 times daily. 100 each 3      hydrOXYzine (VISTARIL) 50 MG capsule Take 1 capsule (50 mg) by mouth nightly as needed for anxiety 30 capsule 1      insulin syringe-needle U-100 (31G X 5/16\" 0.5 ML) 31G X 5/16\" 0.5 ML miscellaneous Use 2 syringes daily or as directed. Use with insulin vials. 100 each 3      ondansetron (ZOFRAN-ODT) 4 MG ODT tab Take 1 tablet (4 mg) by mouth every 8 hours as needed for nausea (Patient not taking: Reported on 6/18/2021) 30 tablet 0      Prenatal Vit-Fe Fumarate-FA (PRENATAL MULTIVITAMIN W/IRON) 27-0.8 MG tablet Take 1 tablet by mouth daily        senna-docusate (SENOKOT-S/PERICOLACE) 8.6-50 MG tablet Take 2 tablets by mouth 2 times daily 20 tablet 0    .        Maternal Past Medical History:     Past Medical History:   Diagnosis Date     Depressive disorder      Diabetes (H)     Gestational     Hypertension     pre-e with first      PONV (postoperative nausea and vomiting)                        Family History:   I have reviewed this patient's family history            Social History:   I have reviewed this patient's social history         Review of Systems:   CONSTITUTIONAL: NEGATIVE for fever, chills, change in weight  INTEGUMENTARY/SKIN: NEGATIVE for worrisome rashes, moles or lesions  EYES: NEGATIVE for vision changes or irritation  ENT/MOUTH: NEGATIVE for ear, mouth and throat problems  RESP: NEGATIVE for significant cough or SOB  BREAST: NEGATIVE for masses, tenderness or discharge  CV: NEGATIVE for chest pain, palpitations or peripheral edema  GI: NEGATIVE for nausea, abdominal pain, heartburn, or change in bowel habits  : NEGATIVE for frequency, dysuria, or hematuria  MUSCULOSKELETAL: NEGATIVE for significant arthralgias or myalgia  NEURO: NEGATIVE for weakness, dizziness or paresthesias  ENDOCRINE: NEGATIVE for temperature intolerance, " skin/hair changes  HEME: NEGATIVE for bleeding problems  PSYCHIATRIC: NEGATIVE for changes in mood or affect          Physical Exam:   Vitals were reviewed  All vitals stable  Temp: 98.2  F (36.8  C) Temp src: Oral BP: (!) 161/88     Resp: 16        Constitutional:   awake, alert, cooperative, no apparent distress, and appears stated age     Eyes:   Lids and lashes normal, pupils equal, round and reactive to light, extra ocular muscles intact, sclera clear, conjunctiva normal     ENT:   Normocephalic, without obvious abnormality, atraumatic, sinuses nontender on palpation, external ears without lesions, oral pharynx with moist mucous membranes, tonsils without erythema or exudates, gums normal and good dentition.     Neck:   Supple, symmetrical, trachea midline, no adenopathy, thyroid symmetric, not enlarged and no tenderness, skin normal     Hematologic / Lymphatic:   no cervical lymphadenopathy and no supraclavicular lymphadenopathy     Back:   Symmetric, no curvature, spinous processes are non-tender on palpation, paraspinous muscles are non-tender on palpation, no costal vertebral tenderness     Lungs:   No increased work of breathing, good air exchange, clear to auscultation bilaterally, no crackles or wheezing     Cardiovascular:   Normal apical impulse, regular rate and rhythm, normal S1 and S2, no S3 or S4, and no murmur noted     Abdomen:   Previous C/B and lap Sarah scars, normal bowel sounds, soft,  Gravid uterus thorne infant vtx  EFW 7#, non-tender, no masses palpated, no hepatosplenomegally     Musculoskeletal:   There is no redness, warmth, or swelling of the joints.  Full range of motion noted.  Motor strength is 5 out of 5 all extremities bilaterally.  Tone is normal.     Neurologic:   Awake, alert, oriented to name, place and time.  Cranial nerves II-XII are grossly intact.  Motor is 5 out of 5 bilaterally.  DTR's 2+ symmetrical  No clonus.      Cervix:   Membranes:  intact     Presentation:Cephalic  Fetal Heart Rate Tracing: reactive and reassuring  Tocometer: external monitor and occasional ctx's                       Assessment:   Kelsea Adams is a 38w0d pregnant female admitted with elevated BP in the severe range consistent with gestational hypertension with severe features, A2 GDM (BS this am 152), previous LST C/B for above indications who desires a RCB with bilateral salpingectomy who last ate at 1 am this am with normal PIH labs 2 days ago.          Plan:   Admit - see IP orders  Prepare for  section with bilateral salpingectomy, emergent Nifedipine given  If BP continue in severe range will institute Hydralazine protocal.  Will recheck PIH labs, NNP for delivery  Informed consent obtained  All questions answered    Brendan العلي MD

## 2021-06-20 NOTE — PROVIDER NOTIFICATION
06/20/21 0342   Provider Notification   Provider Name/Title Dr العلي   Method of Notification In Department   Request Evaluate - Remote   Notification Reason Other (Comment)  (BG)     Pt takes 18 units insulin at bedtime and 24 in the morning, states she forgot to take her bedtime insulin.    Per MD, obtain BG now.

## 2021-06-20 NOTE — PROVIDER NOTIFICATION
06/20/21 1642   Provider Notification   Provider Name/Title Dr. Santacruz   Method of Notification Phone   Request Evaluate-Remote   Notification Reason Medication Request   Md updated about patient being itchy. Md to place nubain orders.    Louise Dhaliwal RN

## 2021-06-20 NOTE — PROVIDER NOTIFICATION
21 0332   Provider Notification   Provider Name/Title Dr العلي   Method of Notification At Bedside     MD discussed POC, consented for  with tubal.

## 2021-06-20 NOTE — PLAN OF CARE
Magnesium infusing with LR. Denies preeclamptic sx.  Stinson draining with adequate output.  VS currently stable. Fundus firm, midline, scant flow.  Incision to low abdomen is with gauze dressing; is CDI.  Is both bottle feeding infant and bottle feeding with formula.  Lactation currently working with couplet. Plans to bottle feed formula and EBM; pumping to begin this evening.

## 2021-06-20 NOTE — OP NOTE
Procedure Date: 2021    PREOPERATIVE DIAGNOSES:    1.  Intrauterine pregnancy at 38 weeks' gestation.  2.  Gestational hypertension with severe features.  3.  White's classification A2 gestational diabetes.  4.  Previous  section, declines trial of labor after .  5.  Sterilization candidate.  6.  Alpha thalassemia.  7.  Mild uterine contractions.    POSTOPERATIVE DIAGNOSES:    1.  Intrauterine pregnancy at 38 weeks' gestation.  2.  Gestational hypertension with severe features.  3.  White's classification A2 gestational diabetes.  4.  Previous  section, declines trial of labor after .  5.  Sterilization candidate.  6.  Alpha thalassemia.  7.  Mild uterine contractions.    TITLE OF PROCEDURES:    1.  Urgent repeat low segment transverse  section.  2.  Manual placental removal .  3.  Bilateral salpingectomy.    SURGEON:  Brendan العلي MD    ASSISTANTS:  None.    HISTORY OF PRESENT ILLNESS:  The patient is a 35-year-old black female,  2, para 0-1-0-1, who is blood group O, Rh positive, rubella status immune, hepatitis B surface antigen negative, HIV nonreactive, GBS positive with a previous low segment transverse  section at 36 and 6/7 weeks in a pregnancy complicated with a breech presentation, intrauterine growth restriction, gestational diabetes,  premature rupture of membranes, and gestational hypertension without proteinuria.  The patient has been on 81 mg of aspirin during this gestation, as well as b.i.d. insulin for White's classification A2 gestational diabetes.  She has had labile elevations of blood pressure.  She has been monitoring her blood pressures at home.  Earlier this morning at home, she had a blood pressure systolic of 180 and presented to Labor and Delivery for evaluation.  Serial blood pressures have been taken and have remained elevated in the severe range.  Her blood sugar on admission was 152.  The patient declines a trial of  labor after  and also desires permanent sterilization.  Federal tubal consent form was signed on 04/15/2021.  The patient is having uterine contractions.  I reviewed these findings with the patient, discussed with her at length the pathophysiology of gestational hypertension with severe features, and reviewed with her the risks, benefits, and alternative forms of therapy.  I also discussed permanent sterilization and its intended permanence intended to be reversible and also less than 100% efficacy.  I think the patient has a good understanding of the nature of the problem, the nature of the procedure, the risks, the benefits, the alternatives.  All of her questions have been answered and informed consent has been obtained.    OPERATIVE FINDINGS:  The infant was a 5 pound 11 ounce female infant, Apgars 8 and 9 at 1 and 5 minutes respectively, delivered from an OA presentation.    DESCRIPTION OF PROCEDURE:  After obtaining informed consent, the patient was taken to the operating room where regional anesthetic was placed.  The patient was prepped and draped in the usual sterile fashion.  A hard stop was performed.  With adequate analgesia present, a Pfannenstiel skin incision was made through her previous incision and carried down to the level of the fascia, cauterizing any bleeders as necessary.  The fascia was nicked in the midline.  The fascial incision extended laterally in the right and left direction with the Curry scissors.  The fascia was then meticulously dissected off the underlying rectus muscles inferiorly to the level of symphysis, superiorly to the level of the umbilicus.  Rectus diastasis was divided in the midline.  Peritoneum identified, grasped with 2 Sylvia, tented and entered with the Metzenbaum scissors, and the incision extended superiorly and inferiorly.  The uterus was checked for rotation.  A bladder blade was placed inferiorly.  Vesicouterine reflection identified, grasped with  pickups, incised with the Metzenbaum scissors, and the incision extended laterally in the right and left direction.  The bladder was then meticulously dissected off the lower uterine segment.  Following this, a low segment transverse uterine incision was made.  The endometrial cavity was entered with a blunt end of the scalpel handle and the incision extended laterally with finger fracture.  Amniotomy was performed with return of clear fluid.  The vertex was gently elevated from the pelvis and delivered atraumatically with fundal pressure.  Oral and nasopharynx were suctioned with bulb suction.  There was a nuchal cord x 1, which was reduced prior to delivery of the shoulders.  The remainder of the infant was delivered.  Delayed cord clamping was undertaken.  The cord was doubly clamped and cut and the infant was handed off to the resuscitation team, who were present for the delivery.  The placenta was delivered by manual extraction.  The uterus was exteriorized and then closed in the following manner:  The right and left lateral margins were closed with figure-of-eight suture of #1 Monocryl.  The first layer was closed with a running, locked suture of #1 Monocryl.  A second vertical imbricating suture of #1 Monocryl was placed superior to the first with good hemostasis.  The bladder flap was reapproximated with a running suture of 3-0 Monocryl.  Good hemostasis resulted.     Attention was then turned to the fallopian tubes.  The right fallopian tube was identified, traced out to the fimbriated end, was grasped with Babcocks, tented, and the mesosalpinx identified.  The mesosalpinx was crossclamped, cauterized with a LigaSure device to the proximal fallopian tube.  The right proximal fallopian tube was identified, crossclamped, cauterized, divided and the specimen submitted to Pathology.  Attention was turned to the left fallopian tube.  The tube was traced out to the fimbriated end.  Again, the fallopian tube and  fimbriated end appeared to be normal, as it did on the right side.  With the tube tented, the mesosalpinx identified, crossclamped, cauterized, and divided with a LigaSure device to the proximal level of the left fallopian tube.  The proximal left tube was identified, crossclamped, cauterized, divided and the specimen submitted to Pathology.  Pedicles were inspected and found to be hemostatic.  Ovaries were inspected and found to be normal bilaterally.  The pelvis was suctioned of excess clot and heme.  The uterus was replaced in the abdominal cavity.  At this point, we paused.  We documented sponge, needle and instrument counts had all been checked and were correct x 2, that hemostasis was checked and found to be acceptable.  With counts correct and hemostasis acceptable, the peritoneum was closed with a running suture of 2-0 Vicryl.  Subfascial area was inspected and found to be hemostatic and the fascia was reapproximated with a running suture of 0 looped PDS.  At the completion of the repair, there was good reapproximation with no palpable defects.  The subcutaneous fat layer was irrigated with warm saline and reapproximated with a running suture of 3-0 plain.  Skin was closed with Insorb stapler.  Quantitative blood loss for this procedure was 526 mL.  The patient tolerated the procedure well and was returned to the recovery area in good condition.  There were no difficulties or complications.    Brendan العلي MD        D: 2021   T: 2021   MT: [facility]    Name:     ERICA SANDERS  MRN:      [id number]        Account:        [id number]   :      1985           Procedure Date: 2021     Document: [id number]    cc:  MD MASTER Lee MD

## 2021-06-20 NOTE — ANESTHESIA CARE TRANSFER NOTE
Patient: Kelsea Adams    Procedure(s):   SECTION, WITH POSTPARTUM TUBAL LIGATION    Diagnosis: 38 weeks gestation of pregnancy [Z3A.38]  Diagnosis Additional Information: No value filed.    Anesthesia Type:   No value filed.     Note:    Oropharynx: oropharynx clear of all foreign objects  Level of Consciousness: awake  Oxygen Supplementation: room air    Independent Airway: airway patency satisfactory and stable  Dentition: dentition unchanged  Vital Signs Stable: post-procedure vital signs reviewed and stable  Report to RN Given: handoff report given  Patient transferred to: Labor and Delivery    Handoff Report: Identifed the Patient, Identified the Reponsible Provider, Reviewed the pertinent medical history, Discussed the surgical course, Reviewed Intra-OP anesthesia mangement and issues during anesthesia, Set expectations for post-procedure period and Allowed opportunity for questions and acknowledgement of understanding      Vitals: (Last set prior to Anesthesia Care Transfer)  CRNA VITALS  2021 0501 - 2021 0536      2021             NIBP:  129/73    NIBP Mean:  90    SpO2:  100 %        Electronically Signed By: WILFRED Jay CRNA  2021  5:36 AM

## 2021-06-20 NOTE — ANESTHESIA PROCEDURE NOTES
Intrathecal Procedure Note  Pre-Procedure   Staff -        Anesthesiologist:  Cheikh Denise MD       Performed By: Anesthesiologist       Location: OR       Pre-Anesthestic Checklist: patient identified, IV checked, risks and benefits discussed, informed consent, monitors and equipment checked, pre-op evaluation and at physician/surgeon's request  Timeout:       Correct Patient: Yes        Correct Procedure: Yes        Correct Site: Yes        Correct Position: Yes   Procedure Documentation  Procedure: intrathecal       Diagnosis: intrauterine pregnancy       Patient Position: sitting       Patient Prep/Sterile Barriers: sterile gloves, mask       Skin prep: Betadine       Insertion Site: L3-4. (midline approach).       Spinal Needle Type: Ledy tip       Introducer used      # of attempts: 1 and  # of redirects:     Assessment/Narrative         Paresthesias: No.       CSF fluid: clear.      Opening pressure was cmH2O while  Sitting.

## 2021-06-20 NOTE — PROVIDER NOTIFICATION
06/20/21 0353   Provider Notification   Provider Name/Title Dr العلي   Method of Notification In Department   Request Evaluate - Remote   Notification Reason Lab/Diagnostic Study     , no new orders at this time, expedite delivery as planned.

## 2021-06-20 NOTE — LACTATION NOTE
Lactation in to see patient. Assisted with feed. Baby latched well, will suck a few times then fall asleep. Shield applied, small baby needing stimulation to suck. Mom stated her plan was not to put baby to breast, she wanted to bottle feed her pumped milk. Will use formula till milk in. Plan to get patient pumping after her nap.

## 2021-06-20 NOTE — ANESTHESIA POSTPROCEDURE EVALUATION
Patient: Kelsea Adams    Procedure(s):   SECTION, WITH POSTPARTUM TUBAL LIGATION    Diagnosis:38 weeks gestation of pregnancy [Z3A.38]  Diagnosis Additional Information: No value filed.    Anesthesia Type:  No value filed.    Note:  Disposition: Admission   Postop Pain Control: Uneventful            Sign Out: Well controlled pain   PONV: No   Neuro/Psych: Uneventful            Sign Out: Acceptable/Baseline neuro status   Airway/Respiratory: Uneventful            Sign Out: Acceptable/Baseline resp. status   CV/Hemodynamics: Uneventful            Sign Out: Acceptable CV status; No obvious hypovolemia; No obvious fluid overload   Other NRE: NONE   DID A NON-ROUTINE EVENT OCCUR? No           Last vitals:  Vitals:    21 0338 21 0410 21 0535   BP: (!) 164/89 (!) 186/96 (!) 144/85   Resp:      Temp:   97.9  F (36.6  C)       Last vitals prior to Anesthesia Care Transfer:  CRNA VITALS  2021 0501 - 2021 0601      2021             NIBP:  129/73    NIBP Mean:  90    SpO2:  100 %          Electronically Signed By: Cheikh Denise MD  2021  6:51 AM

## 2021-06-20 NOTE — PROGRESS NOTES
Preop Diagnosis: IUP 38 weeks gestation, gestational hypertension with severe features, A2 GDM, previous C/B declines TOLAC, sterilization candidate, alpha Thal (mild),uterine contractions    Post-Op Diagnosis: same    Procedure: LST  section and bilateral salpingectomy, manual placental removal    Surgeon: KYLER LAZCANO MD, Assistant: none    EBL: 526 cc    Anesthesia: Spinal    Path: bilateral fallopian tubes, placenta    Complications: no problems reported    Findings: see dictated operative note for full details        KYLER LAZCANO MD  5:22 AM  2021

## 2021-06-20 NOTE — PROVIDER NOTIFICATION
06/20/21 0311   Provider Notification   Provider Name/Title Dr. العلي   Method of Notification Phone     Clarified hypertensive orders for treatment medication; orders to do the hydralazine protocol. MD on his way. Will update primary RNMarni.

## 2021-06-20 NOTE — PROGRESS NOTES
PIH labs were reviewed and are within acceptable limits    A:  Gestational hypertension with severe features, A2 GDM, previous C/B pt declines TOLAC, sterilization candidate  P: RCB with bilateral salpingectomy with appropriate postop/postpartum mgmnt of BP and GDM

## 2021-06-20 NOTE — PROVIDER NOTIFICATION
21 0255   Provider Notification   Provider Name/Title Dr. العلي   Method of Notification Phone   Request Evaluate - Remote   Notification Reason Patient Arrived;Maternal Vital Sign Change;Uterine Activity     MD notified of pt arrival for blood pressure evaluation. , hx of  for breech, plans for repeat c section with tubal. 38 weeks today, GBS+, O+, history of pre-e with first, has had elevated blood pressures this pregnancy as well, discussed plan at last OB visit to proceed with delivery if blood pressures continue to be elevated, reports BP of 170/102 at home. First BP here is 178/90, working on obtaining IV access. Pt also has GDMA2. She also reports contractions since about 0130, does not appear uncomfortable, 1 contraction picked up on toco so far.    TORB for nifedipine per preeclampsia order set, also PIH labs per order set. MD will come in to discuss proceeding with  delivery.

## 2021-06-21 ENCOUNTER — MYC MEDICAL ADVICE (OUTPATIENT)
Dept: OBGYN | Facility: CLINIC | Age: 36
End: 2021-06-21

## 2021-06-21 LAB
ALT SERPL W P-5'-P-CCNC: 17 U/L (ref 0–50)
AST SERPL W P-5'-P-CCNC: 21 U/L (ref 0–45)
CREAT SERPL-MCNC: 0.63 MG/DL (ref 0.52–1.04)
ERYTHROCYTE [DISTWIDTH] IN BLOOD BY AUTOMATED COUNT: 15.1 % (ref 10–15)
GFR SERPL CREATININE-BSD FRML MDRD: >90 ML/MIN/{1.73_M2}
GLUCOSE BLDC GLUCOMTR-MCNC: 159 MG/DL (ref 70–99)
GLUCOSE BLDC GLUCOMTR-MCNC: 75 MG/DL (ref 70–99)
GLUCOSE BLDC GLUCOMTR-MCNC: 95 MG/DL (ref 70–99)
HCT VFR BLD AUTO: 30.6 % (ref 35–47)
HGB BLD-MCNC: 9.7 G/DL (ref 11.7–15.7)
MCH RBC QN AUTO: 25.3 PG (ref 26.5–33)
MCHC RBC AUTO-ENTMCNC: 31.7 G/DL (ref 31.5–36.5)
MCV RBC AUTO: 80 FL (ref 78–100)
PLATELET # BLD AUTO: 231 10E9/L (ref 150–450)
RBC # BLD AUTO: 3.84 10E12/L (ref 3.8–5.2)
WBC # BLD AUTO: 14.4 10E9/L (ref 4–11)

## 2021-06-21 PROCEDURE — 999N001017 HC STATISTIC GLUCOSE BY METER IP

## 2021-06-21 PROCEDURE — 82565 ASSAY OF CREATININE: CPT | Performed by: FAMILY MEDICINE

## 2021-06-21 PROCEDURE — 36415 COLL VENOUS BLD VENIPUNCTURE: CPT | Performed by: FAMILY MEDICINE

## 2021-06-21 PROCEDURE — 99222 1ST HOSP IP/OBS MODERATE 55: CPT | Performed by: INTERNAL MEDICINE

## 2021-06-21 PROCEDURE — 85027 COMPLETE CBC AUTOMATED: CPT | Performed by: FAMILY MEDICINE

## 2021-06-21 PROCEDURE — 250N000013 HC RX MED GY IP 250 OP 250 PS 637: Performed by: FAMILY MEDICINE

## 2021-06-21 PROCEDURE — 84460 ALANINE AMINO (ALT) (SGPT): CPT | Performed by: FAMILY MEDICINE

## 2021-06-21 PROCEDURE — 120N000001 HC R&B MED SURG/OB

## 2021-06-21 PROCEDURE — 250N000011 HC RX IP 250 OP 636: Performed by: FAMILY MEDICINE

## 2021-06-21 PROCEDURE — 84450 TRANSFERASE (AST) (SGOT): CPT | Performed by: FAMILY MEDICINE

## 2021-06-21 PROCEDURE — 250N000013 HC RX MED GY IP 250 OP 250 PS 637: Performed by: OBSTETRICS & GYNECOLOGY

## 2021-06-21 RX ORDER — IBUPROFEN 800 MG/1
800 TABLET, FILM COATED ORAL EVERY 6 HOURS
Status: DISCONTINUED | OUTPATIENT
Start: 2021-06-21 | End: 2021-06-23 | Stop reason: HOSPADM

## 2021-06-21 RX ADMIN — DOCUSATE SODIUM AND SENNOSIDES 1 TABLET: 8.6; 5 TABLET, FILM COATED ORAL at 20:39

## 2021-06-21 RX ADMIN — KETOROLAC TROMETHAMINE 30 MG: 30 INJECTION, SOLUTION INTRAMUSCULAR; INTRAVENOUS at 07:30

## 2021-06-21 RX ADMIN — ACETAMINOPHEN 975 MG: 325 TABLET, FILM COATED ORAL at 17:35

## 2021-06-21 RX ADMIN — OXYCODONE HYDROCHLORIDE 5 MG: 5 TABLET ORAL at 12:25

## 2021-06-21 RX ADMIN — ACETAMINOPHEN 975 MG: 325 TABLET, FILM COATED ORAL at 23:51

## 2021-06-21 RX ADMIN — IBUPROFEN 800 MG: 800 TABLET, FILM COATED ORAL at 23:51

## 2021-06-21 RX ADMIN — DOCUSATE SODIUM AND SENNOSIDES 1 TABLET: 8.6; 5 TABLET, FILM COATED ORAL at 11:15

## 2021-06-21 RX ADMIN — ACETAMINOPHEN 975 MG: 325 TABLET, FILM COATED ORAL at 05:04

## 2021-06-21 RX ADMIN — NALBUPHINE HYDROCHLORIDE 10 MG: 10 INJECTION, SOLUTION INTRAMUSCULAR; INTRAVENOUS; SUBCUTANEOUS at 04:36

## 2021-06-21 RX ADMIN — KETOROLAC TROMETHAMINE 30 MG: 30 INJECTION, SOLUTION INTRAMUSCULAR; INTRAVENOUS at 01:37

## 2021-06-21 RX ADMIN — IBUPROFEN 800 MG: 800 TABLET, FILM COATED ORAL at 17:32

## 2021-06-21 RX ADMIN — CITALOPRAM HYDROBROMIDE 10 MG: 10 TABLET ORAL at 17:32

## 2021-06-21 RX ADMIN — ACETAMINOPHEN 975 MG: 325 TABLET, FILM COATED ORAL at 11:16

## 2021-06-21 NOTE — PLAN OF CARE
Patient had been complaining of being very tired since the beginning of the shift. She also had nubain for itching. Writer came back from dinner and charge RN reported that pt is slurring her words and is very out of it. MD phoned - mag level to be drawn, mag decreased to 1gram/hour. Continue to monitor

## 2021-06-21 NOTE — CONSULTS
[facility]  Hospitalist Consult Note    Name: Kelsea Adams      MRN: [id number]  YOB: 1985    Age: 35 year old  Date of admission: 2021  Primary care provider: Gregoria Kaba     Requesting Physician:  Brendan العلي MD  Reason for consultation:  To determine transition plan for glycemic management from labor to postpartum         Assessment and Plan:   Kelsea Adams is a 35 year old female with a history of , Gestational Diabetes, Gestational Hypertension, Alpha Thalassemia who was admitted for urgent  on 2021.    1.  Gestational Diabetes  - A1C = 5.5 on 2021  - Was diagnosed with gestational diabetes during her first pregnancy.  Postpartum she did not require insulin therapy at that time.  Per pt, she has required insulin therapy throughout her entire second pregnancy.  She had been taking  NPH 24 units before breakfast and 18 units before bedtime.  Her mother and father have type 2 diabetes mellitus and are on oral therapy.    - Blood sugars have been improving since delivery.  AM blood sugar was 159.    - Would recommend continuing insulin sliding scale for now.  Watch BS overnight and obtain fasting BS in AM.  Would anticipate no insulin at discharge.  - Discussed with pt.  Recommended oral glucose tolerance test in 6-8 weeks.    2.  Normocytic Anemia  - Baseline Hgb 11-12  - Likely secondary to IVF and post procedure  - Repeat CBC in 1 week    3.  Leukocytosis  - Likely reactive  - Repeat CBC in 1 week    4.  Gestational Hypertension  - Management per OB    Code status: Full code  Prophylaxis: Currently on SCDs, ultimately deferred to the primary service.  Disposition: Deferred to the primary service    Thank you for the consultation, we will continue to follow along during the hospitalization. Please page with any questions or concerns.         History of Present Illness:   Kelsea Adams is a 35 year old female who is being  hospitalized for urgent . Pre-operative note was fully reviewed and recommendations acknowledged. Op note and anesthesia notes and flow sheets were also reviewed.     The patient had no complications related to the procedure and has had an unremarkable post-operative course to this point. Currently pain is adequately controlled. No nausea, vomiting, diarrhea or constipation. No fevers, chills, diaphoresis. No chest pain, palpitations, dyspnea.  Tolerating oral intake. No excessive somnolence and patient is fully alert and oriented. The patient has no other complaints at this time.  Was diagnosed with gestational diabetes during her first pregnancy.  Postpartum she did not require insulin therapy at that time.  Per pt, she has required insulin therapy throughout her entire second pregnancy.  She had been taking  NPH 24 units before breakfast and 18 units before bedtime.  Her mother and father have type 2 diabetes mellitus and are on oral therapy.                 Past Medical History:     Past Medical History:   Diagnosis Date     Depressive disorder      Diabetes (H)     Gestational     Hypertension     pre-e with first      PONV (postoperative nausea and vomiting)              Past Surgical History:     Past Surgical History:   Procedure Laterality Date     C/SECTION, LOW TRANSVERSE      LTCS x 1 ([place])     LAPAROSCOPIC CHOLECYSTECTOMY N/A 2021    Procedure: CHOLECYSTECTOMY, LAPAROSCOPIC;  Surgeon: David Fatima MD;  Location:  OR               Social History:     Social History     Tobacco Use     Smoking status: Former Smoker     Smokeless tobacco: Never Used   Substance Use Topics     Alcohol use: Not Currently             Family History:   Family history was fully reviewed and non-contributory in this case.          Allergies:   No Known Allergies          Medications:     Prior to Admission medications    Medication Sig Last Dose Taking? Auth Provider   aspirin (ASA) 81 MG  "chewable tablet Take 1 tablet (81 mg) by mouth daily 6/20/2021 at Unknown time Yes Ramírez Rodriguez MD   citalopram (CELEXA) 10 MG tablet Take 1 tablet (10 mg) by mouth daily 6/19/2021 at Unknown time Yes Gregoria Kaba MD   hydrOXYzine (VISTARIL) 25 MG capsule Take 1 capsule (25 mg) by mouth 3 times daily as needed for anxiety 6/20/2021 at Unknown time Yes Gregoria Kaba MD   insulin NPH (NOVOLIN N VIAL) 100 UNIT/ML vial 24 units before breakfast AND 18 units before bedtime. TDD up to 50 units. May dispense Humulin N Vial if preferred. Dose update for next fill. 6/19/2021 at Unknown time Yes Gregoria Kaba MD   Prenatal Vit-Fe Fumarate-FA (PRENATAL MULTIVITAMIN W/IRON) 27-0.8 MG tablet Take 1 tablet by mouth daily 6/19/2021 at Unknown time Yes Reported, Patient   ACCU-CHEK GUIDE test strip Use to test blood sugar 6 times daily or as directed.   Gregoria Kaba MD   acetone urine (KETOSTIX) test strip Use 1 strip/day with first morning urine until ketones are negative for 7 days in a row, then use 1 strip/week   Gregoria Kaba MD   BD PEN NEEDLE JOCE 2ND GEN 32G X 4 MM miscellaneous USE TWO DAILY   Reported, Patient   blood glucose monitoring (SOFTCLIX) lancets Use to test blood sugar 4 times daily.   Gregoria Kaba MD   hydrOXYzine (VISTARIL) 50 MG capsule Take 1 capsule (50 mg) by mouth nightly as needed for anxiety   Gregoria Kaba MD   insulin syringe-needle U-100 (31G X 5/16\" 0.5 ML) 31G X 5/16\" 0.5 ML miscellaneous Use 2 syringes daily or as directed. Use with insulin vials.   Ramírez Rodriguez MD   ondansetron (ZOFRAN-ODT) 4 MG ODT tab Take 1 tablet (4 mg) by mouth every 8 hours as needed for nausea  Patient not taking: Reported on 6/18/2021   Gregoria Kaba MD   senezra-docusate (SENOKOT-S/PERICOLACE) 8.6-50 MG tablet Take 2 tablets by mouth 2 times daily   Ramírez Rodriguez MD       Current hospital administered " medication list (MAR) also reviewed.          Review of Systems:     A comprehensive greater than 10 system review of systems was carried out.  Pertinent positives and negatives are noted above.  Otherwise negative for contributory info.            Physical Exam:   Blood pressure 133/78, pulse 69, temperature 98.1  F (36.7  C), temperature source Oral, resp. rate 14, last menstrual period 09/27/2020, SpO2 99 %, unknown if currently breastfeeding.  Exam:  GENERAL: No apparent distress. Awake, alert, and fully oriented.  HEENT: Normocephalic, atraumatic. Extraocular movements intact.  CARDIOVASCULAR: Regular rate and rhythm without murmurs or rubs. No S3.  PULMONARY: Clear bilaterally.  ABDOMINAL: Soft, non-tender, non-distended. Bowel sounds normoactive. No hepatosplenomegaly.  EXTREMITIES: No cyanosis or clubbing. No edema.  NEUROLOGICAL: CN 2-12 grossly intact, no focal neurological deficits.  DERMATOLOGICAL: No rash, ulcer, ecchymoses, jaundice.         Data:   EKG/Telemetry:  Personally reviewed available data.    Laboratory: Personally reviewed available data.  Recent Labs   Lab 06/21/21  0609 06/20/21  0328 06/18/21  1451   WBC 14.4* 13.3* 12.9*   HGB 9.7* 11.7 12.4   HCT 30.6* 35.9 38.2   MCV 80 78 79    257 245     Recent Labs   Lab 06/21/21  0609 06/20/21  0328 06/18/21  1451   CR 0.63 0.64 0.66   GFRESTIMATED >90 >90 >90   GFRESTBLACK >90 >90 >90     No results for input(s): CULT in the last 168 hours.    Imaging: Personally reviewed available data.  No results found for this or any previous visit (from the past 24 hour(s)).      Jon Rogers DO  [facility] Hospitalist  [address] [place] [address]  06/21/2021

## 2021-06-21 NOTE — PROVIDER NOTIFICATION
06/20/21 2114   Provider Notification   Provider Name/Title Dr. Santacruz   Method of Notification Phone   Request Evaluate-Remote   Notification Reason Medication Request;Lab Results   Requesting additional pain medication for pain rating 5-7. Oxycodone 5-10mg tablets every 3 hours. Updated on mag level of 5.9. Patient is still lethargic, because of this, will remain at 1g/hr.

## 2021-06-21 NOTE — RESULT ENCOUNTER NOTE
Inform patient that her labs returned normal. As discussed in clinic, we will re-assess her blood pressure to see if early delivery is needed.     Gregoria Kaba MD

## 2021-06-21 NOTE — PLAN OF CARE
Assumed care at 0300. VSS. Urine output adequate. Mag running at 1g/hr. Tolerating fluids and crackers. Pumped x1 on shift. Formula feeding infant via bottle. Assistance with cares provided. Bonding well with infant.

## 2021-06-21 NOTE — PLAN OF CARE
Pt POD #1. Discomfort controlled with PRN and scheduled medications. Mag and marin discontinued this AM per orders. Breastfeeding and nipple stimulation encouraged (pump and/or hand expression, putting baby to breast), as pt has been mostly formula feeding and expresses an interest in breastfeeding. Pt has voided x1 since marin removal. BG monitoring continues per hospitalist orders. Bonding noted between mother and infant. Plan for possible discharge home tomorrow.

## 2021-06-21 NOTE — LACTATION NOTE
"This note was copied from a baby's chart.  Lactation visit. This is Kelsea's second child, she reports she did not nurse her first baby due to baby \"not liking to nurse\". She has intermittently placed this infant to breast, has mostly bottle fed and began pumping this AM. Writer assisted with latching infant on R breast, infant sleepy/disinterested in sucking. Kelsea reports bottle feeding infant 2 hours prior. Placed infant STS, encouraged Kelsea to attempt breast first followed by pumping/supplementing. Discussed importance of pumping when supplementing to bring in full milk supply. Kelsea is aware she may call for lactation assistance prn with latching. Bedside RN updated on visit.   "

## 2021-06-21 NOTE — PROGRESS NOTES
SPIRITUAL HEALTH SERVICES Progress Note  [facility]    Met with pt per routine hospital admission  request. Oriented pt to Spiritual Health Services. Pt requested a prayer for her , Sunita. Prayed and informed pt that Spiritual Health Services remain available for further consultation at her request.    Ned Ramos   Intern  Pager [phone]

## 2021-06-21 NOTE — PLAN OF CARE
VSS; at one point she complained of a headache but that has since resolved. No other PIH symptoms. Reflexes are absent and no clonus noted. She was extremely lethargic at the beginning of the shift and charge RN noted her slurring her speech. Mag has since been decreased to 1 gm/hr which she appears to be tolerating well. Blood sugars have been below 140. She was out of bed once this evening and moved very well. Cares were completed. She needed oxycodone once this evening for pain not controlled with the scheduled tylenol and motrin.

## 2021-06-22 LAB
ALBUMIN SERPL-MCNC: 2.4 G/DL (ref 3.4–5)
ALP SERPL-CCNC: 135 U/L (ref 40–150)
ALT SERPL W P-5'-P-CCNC: 19 U/L (ref 0–50)
ANION GAP SERPL CALCULATED.3IONS-SCNC: 6 MMOL/L (ref 3–14)
AST SERPL W P-5'-P-CCNC: 20 U/L (ref 0–45)
BILIRUB SERPL-MCNC: 0.3 MG/DL (ref 0.2–1.3)
BUN SERPL-MCNC: 14 MG/DL (ref 7–30)
CALCIUM SERPL-MCNC: 8.8 MG/DL (ref 8.5–10.1)
CHLORIDE SERPL-SCNC: 105 MMOL/L (ref 94–109)
CO2 SERPL-SCNC: 25 MMOL/L (ref 20–32)
COPATH REPORT: NORMAL
CREAT SERPL-MCNC: 0.74 MG/DL (ref 0.52–1.04)
ERYTHROCYTE [DISTWIDTH] IN BLOOD BY AUTOMATED COUNT: 15.1 % (ref 10–15)
GFR SERPL CREATININE-BSD FRML MDRD: >90 ML/MIN/{1.73_M2}
GLUCOSE BLDC GLUCOMTR-MCNC: 117 MG/DL (ref 70–99)
GLUCOSE BLDC GLUCOMTR-MCNC: 90 MG/DL (ref 70–99)
GLUCOSE SERPL-MCNC: 94 MG/DL (ref 70–99)
HCT VFR BLD AUTO: 33.8 % (ref 35–47)
HGB BLD-MCNC: 10.6 G/DL (ref 11.7–15.7)
MCH RBC QN AUTO: 25.3 PG (ref 26.5–33)
MCHC RBC AUTO-ENTMCNC: 31.4 G/DL (ref 31.5–36.5)
MCV RBC AUTO: 81 FL (ref 78–100)
PLATELET # BLD AUTO: 274 10E9/L (ref 150–450)
POTASSIUM SERPL-SCNC: 4.5 MMOL/L (ref 3.4–5.3)
PROT SERPL-MCNC: 6 G/DL (ref 6.8–8.8)
RBC # BLD AUTO: 4.19 10E12/L (ref 3.8–5.2)
SODIUM SERPL-SCNC: 136 MMOL/L (ref 133–144)
WBC # BLD AUTO: 13.5 10E9/L (ref 4–11)

## 2021-06-22 PROCEDURE — 120N000001 HC R&B MED SURG/OB

## 2021-06-22 PROCEDURE — 250N000013 HC RX MED GY IP 250 OP 250 PS 637: Performed by: OBSTETRICS & GYNECOLOGY

## 2021-06-22 PROCEDURE — 85027 COMPLETE CBC AUTOMATED: CPT | Performed by: INTERNAL MEDICINE

## 2021-06-22 PROCEDURE — 258N000003 HC RX IP 258 OP 636: Performed by: OBSTETRICS & GYNECOLOGY

## 2021-06-22 PROCEDURE — 80053 COMPREHEN METABOLIC PANEL: CPT | Performed by: INTERNAL MEDICINE

## 2021-06-22 PROCEDURE — 250N000013 HC RX MED GY IP 250 OP 250 PS 637: Performed by: FAMILY MEDICINE

## 2021-06-22 PROCEDURE — 999N001017 HC STATISTIC GLUCOSE BY METER IP

## 2021-06-22 PROCEDURE — 250N000011 HC RX IP 250 OP 636: Performed by: OBSTETRICS & GYNECOLOGY

## 2021-06-22 PROCEDURE — 36415 COLL VENOUS BLD VENIPUNCTURE: CPT | Performed by: INTERNAL MEDICINE

## 2021-06-22 PROCEDURE — 99232 SBSQ HOSP IP/OBS MODERATE 35: CPT | Performed by: INTERNAL MEDICINE

## 2021-06-22 RX ORDER — LABETALOL HYDROCHLORIDE 5 MG/ML
20 INJECTION, SOLUTION INTRAVENOUS EVERY 10 MIN PRN
Status: DISCONTINUED | OUTPATIENT
Start: 2021-06-22 | End: 2021-06-23 | Stop reason: HOSPADM

## 2021-06-22 RX ORDER — CALCIUM GLUCONATE 94 MG/ML
1 INJECTION, SOLUTION INTRAVENOUS
Status: DISCONTINUED | OUTPATIENT
Start: 2021-06-22 | End: 2021-06-23 | Stop reason: HOSPADM

## 2021-06-22 RX ORDER — SODIUM CHLORIDE, SODIUM LACTATE, POTASSIUM CHLORIDE, CALCIUM CHLORIDE 600; 310; 30; 20 MG/100ML; MG/100ML; MG/100ML; MG/100ML
10-125 INJECTION, SOLUTION INTRAVENOUS CONTINUOUS
Status: DISCONTINUED | OUTPATIENT
Start: 2021-06-22 | End: 2021-06-23 | Stop reason: HOSPADM

## 2021-06-22 RX ORDER — MAGNESIUM SULFATE IN WATER 40 MG/ML
2 INJECTION, SOLUTION INTRAVENOUS CONTINUOUS
Status: DISCONTINUED | OUTPATIENT
Start: 2021-06-22 | End: 2021-06-23 | Stop reason: HOSPADM

## 2021-06-22 RX ORDER — NIFEDIPINE 30 MG/1
30 TABLET, EXTENDED RELEASE ORAL DAILY
Status: DISCONTINUED | OUTPATIENT
Start: 2021-06-22 | End: 2021-06-23 | Stop reason: HOSPADM

## 2021-06-22 RX ORDER — LABETALOL HYDROCHLORIDE 5 MG/ML
40 INJECTION, SOLUTION INTRAVENOUS EVERY 10 MIN PRN
Status: DISCONTINUED | OUTPATIENT
Start: 2021-06-22 | End: 2021-06-23 | Stop reason: HOSPADM

## 2021-06-22 RX ORDER — MAGNESIUM SULFATE HEPTAHYDRATE 40 MG/ML
4 INJECTION, SOLUTION INTRAVENOUS ONCE
Status: COMPLETED | OUTPATIENT
Start: 2021-06-22 | End: 2021-06-22

## 2021-06-22 RX ORDER — LABETALOL HYDROCHLORIDE 5 MG/ML
80 INJECTION, SOLUTION INTRAVENOUS EVERY 10 MIN PRN
Status: DISCONTINUED | OUTPATIENT
Start: 2021-06-22 | End: 2021-06-23 | Stop reason: HOSPADM

## 2021-06-22 RX ADMIN — MAGNESIUM SULFATE HEPTAHYDRATE 2 G/HR: 40 INJECTION, SOLUTION INTRAVENOUS at 18:30

## 2021-06-22 RX ADMIN — LABETALOL HYDROCHLORIDE 20 MG: 5 INJECTION, SOLUTION INTRAVENOUS at 07:15

## 2021-06-22 RX ADMIN — SODIUM CHLORIDE, POTASSIUM CHLORIDE, SODIUM LACTATE AND CALCIUM CHLORIDE 75 ML/HR: 600; 310; 30; 20 INJECTION, SOLUTION INTRAVENOUS at 17:57

## 2021-06-22 RX ADMIN — IBUPROFEN 800 MG: 800 TABLET, FILM COATED ORAL at 13:20

## 2021-06-22 RX ADMIN — IBUPROFEN 800 MG: 800 TABLET, FILM COATED ORAL at 19:24

## 2021-06-22 RX ADMIN — DOCUSATE SODIUM AND SENNOSIDES 1 TABLET: 8.6; 5 TABLET, FILM COATED ORAL at 08:26

## 2021-06-22 RX ADMIN — OXYCODONE HYDROCHLORIDE 5 MG: 5 TABLET ORAL at 17:55

## 2021-06-22 RX ADMIN — ACETAMINOPHEN 975 MG: 325 TABLET, FILM COATED ORAL at 13:19

## 2021-06-22 RX ADMIN — CITALOPRAM HYDROBROMIDE 10 MG: 10 TABLET ORAL at 08:26

## 2021-06-22 RX ADMIN — LABETALOL HYDROCHLORIDE 20 MG: 5 INJECTION, SOLUTION INTRAVENOUS at 17:24

## 2021-06-22 RX ADMIN — NIFEDIPINE 30 MG: 30 TABLET, FILM COATED, EXTENDED RELEASE ORAL at 17:44

## 2021-06-22 RX ADMIN — MAGNESIUM SULFATE HEPTAHYDRATE 4 G: 40 INJECTION, SOLUTION INTRAVENOUS at 17:58

## 2021-06-22 RX ADMIN — NIFEDIPINE 10 MG: 10 CAPSULE ORAL at 06:59

## 2021-06-22 RX ADMIN — OXYCODONE HYDROCHLORIDE 5 MG: 5 TABLET ORAL at 09:23

## 2021-06-22 RX ADMIN — ACETAMINOPHEN 975 MG: 325 TABLET, FILM COATED ORAL at 19:24

## 2021-06-22 RX ADMIN — DOCUSATE SODIUM AND SENNOSIDES 1 TABLET: 8.6; 5 TABLET, FILM COATED ORAL at 20:20

## 2021-06-22 RX ADMIN — OXYCODONE HYDROCHLORIDE 5 MG: 5 TABLET ORAL at 02:14

## 2021-06-22 RX ADMIN — IBUPROFEN 800 MG: 800 TABLET, FILM COATED ORAL at 05:35

## 2021-06-22 RX ADMIN — ACETAMINOPHEN 975 MG: 325 TABLET, FILM COATED ORAL at 05:35

## 2021-06-22 NOTE — PROVIDER NOTIFICATION
06/22/21 1728   Provider Notification   Provider Name/Title Dr Zimmerman   Method of Notification Electronic Page   Request Evaluate-Remote   Notification Reason Vital Signs Change   updated re: BPs: 167/92 and 173/86. Giving 20mg IV labetolol per protocol.

## 2021-06-22 NOTE — PROVIDER NOTIFICATION
06/22/21 1734   Provider Notification   Provider Name/Title Dr Zimmerman   Method of Notification Phone   Orders received for 4gm load of magnesium and then 2g/hr after that. Plan to reevaluate need for mag after approx 12 hours. CMP and CBC in AM.

## 2021-06-22 NOTE — PROGRESS NOTES
Called regarding severe range blood pressure.  Patient asymptomatic other than mild headache, unchanged.  Plan:  -labetalol 20 mg IV if still severe range in 15 mins.  -start procardia xl 30 mg nightly, first dose now.  -if IV labetalol is required, will restart mag for 12 hours.  -labs to be repeated in the morning.    Annamaria Zimmerman MD

## 2021-06-22 NOTE — PLAN OF CARE
Patient's BP remains elevated but not severe. No complaints of pre-e symptoms. Ambulating and independent with cares. Tolerating a normal diet. Will get fasting AM OT per hospitalist note. Has not pumped on shift. Education provided. Formula feeding infant via bottle.

## 2021-06-22 NOTE — PROVIDER NOTIFICATION
06/22/21 1326   Provider Notification   Provider Name/Title DR Zimmerman   Method of Notification Phone   Notification Reason Status Update   Comments   Comments review BP readings    BP readings reviewed , do BP reading in 4 hours and TEXT PAGE DR Zimmerman with this reading please.

## 2021-06-22 NOTE — LACTATION NOTE
Lactation in to see patient. Baby rooting. Writer assisted with latch, shield used baby not able to maintain latch. Mother wanting to pump and bottle feed. Encouraged patient to pump every 3 hours to establish milk supply.

## 2021-06-22 NOTE — PROVIDER NOTIFICATION
06/22/21 0642   Provider Notification   Provider Name/Title Dr. BARBI Kam   Method of Notification Phone   Request Evaluate-Remote   Notification Reason Vital Signs Change   Updated Dr. Kam regarding patient's first severe range BP of 164/88. No HA, vision changes, and epigastric pain. Reflexes +1. No IV access. Plan to check in 15 min. If severe, will administer PO Nifedipine 10 mg now and start an IV to follow IV labetalol protocol. Added a CMP onto morning labs.

## 2021-06-22 NOTE — PLAN OF CARE
Patient was given oral Nifedipine 10 mg  and then Labetalol IV 20 mg for elevated BP by Night shift RN as per MD . at change of shift, BP monitored , please see associated flow sheets . Seen by DR Zimmerman and patient now on hourly BP readings, depending on this , possible oral antihypertensives to be prescribed. Patient did complain of a headache this morning , oxycodone given with good effect. No other PIH symptoms. Labs unremarkable. BG this am 94 no insulin correction needed, seen by hospitalist and BG monitoring changed to BID am and evening. Will continue to monitor.   Lavender given as patient finding it difficult to relax to sleep.   Will update MD , patient has a headache again, oral analgesia given, trying to rest. headache improving, complaining of waking up with a start during short episodes of sleep, DTR 0 no clonus.   Next BP can be done in 4 hours and needs to be text paged to DR Zimmerman please, next shift RN will be updated.

## 2021-06-22 NOTE — PLAN OF CARE
VSS; patient denies PIH symptoms this shift.  Reflexes normal with no clonus. Voiding without difficulty and adequate amounts measured.  Blood sugars this evening 75 and 95, no insulin required per parameters.  Showered independently.  Incision dressing removed after shower; incision reinforced with new steri strips.  No signs of drainage or infection noted. Pain managed with tylenol and IBU with stated relief.  Mother prefers to pump an bottle feed infant, supplementing with formula.  Meeting expected goals.

## 2021-06-22 NOTE — PROVIDER NOTIFICATION
Notified of /77, Per MD if 15 minute recheck is equal to or above 160/110 follow hypertensive order set and give 20mg labetalol IV for one dose and continue to follow protocol.  MD to put orders in for extended release Nifedipine starting now. Will text page MD with 15 minute recheck BP.

## 2021-06-22 NOTE — PROGRESS NOTES
June 22, 2021    DAILY NOTE - POSTOP DAY 2    SUBJECTIVE: Delivered for preeclampsia with SF, was on mag 24 hours. Developed severe range blood pressure this am, treated with po nifedipine and then with IV labetalol times 1. After this, normotensive to nonsevere range blood pressures noted. Slight headache, no other symptoms. Resting.    Pain controlled? Yes  Tolerating a regular diet? YES  Ambulating? YES  Voiding without difficulty? Yes  Lochia? minimal and moderate  Breastfeeding:  yes        OBJECTIVE:  Vitals:    06/22/21 1710 06/22/21 1720 06/22/21 1734 06/22/21 1744   BP: (!) 167/92 (!) 173/86 (!) 157/85 (!) 165/85   Pulse: 67 68 71 83   Resp: 16  16 16   Temp:       TempSrc:       SpO2:       Weight:           Constitutional: healthy, alert and no distress  Abdomen:  Uterine fundus is firm, non-tender and at the level of the umbilicus   Incision: C/D/I   LE:  trace edema, non-tender      LABS:  Hemoglobin   Date Value Ref Range Status   06/22/2021 10.6 (L) 11.7 - 15.7 g/dL Final   06/21/2021 9.7 (L) 11.7 - 15.7 g/dL Final     No results found for: [id number]   Lab Results   Component Value Date    ABO O 06/20/2021           Lab Results   Component Value Date    RH Pos 06/20/2021      No components found for: CMP      ASSESSMENT:   POD #2  Repeat LTCS    Preeclampsia with sf, now with severe range hypertension responsive to meds       PLAN:      Continue routine care postop.  Watch blood pressures carefully, if sustained at 150/100 or higher, consider oral meds. Labs within normal limits. If needs treatment again for severe range pressures, resume mag sulfate. Discussed with the patient that she will not be discharged today as we need to continue to monitor her hypertension.    Anticipate discharge in 1-2 days, pending stabilization of her blood pressures.    Annamaria Zimmerman MD

## 2021-06-22 NOTE — PROGRESS NOTES
[facility]  Hospitalist Progress Note    Assessment & Plan   Kelsea Adams is a 35 year old female with a history of , Gestational Diabetes, Gestational Hypertension, Alpha Thalassemia who was admitted for urgent  on 2021.     Gestational Diabetes, : A1C = 5.5 on 2021. Diagnosed with gestational diabetes during her first pregnancy.  Postpartum she did not require insulin therapy at that time.  Per pt, she has required insulin therapy throughout her entire second pregnancy.  She had been taking  NPH 24 units before breakfast and 18 units before bedtime.  Her mother and father have type 2 diabetes mellitus and are on oral therapy.    - POCT glucose BID; previously ACHS but given stable BG will reduce checks. sliding scale insulin BID; hypoglycemia protocol. Anticipate no insulin at discharge with BG monitoring at home.   - Recommended oral glucose tolerance test in 6-8 weeks.     Normocytic Anemia  - Baseline Hgb 11-12  - Likely secondary to IVF and post procedure  - Repeat CBC in 1 week     Leukocytosis  - Likely reactive  - Repeat CBC in 1 week     Gestational Hypertension  - Management per OB    FEN: Defer to OB  Activity: As tolerated  DVT Prophylaxis: Pneumatic Compression Devices    Code Status: Full Code    Expected discharge: Per OB    Rebeka Fountain, DO    Text Page (7am - 6pm, M-F)    Interval History   Doing well today. Fatigued. Mild HA without vision changes or neurological symptoms. Similar HA to what she usually experiences. No chest pain or palpitations. No respiratory issues. Appetite ok. Pain controlled. Discussed with nursing.     -Data reviewed today: I reviewed all new labs and imaging results over the last 24 hours. I personally reviewed     Physical Exam   Temp: 98.3  F (36.8  C) Temp src: Oral BP: 130/72 Pulse: 72   Resp: 16        Vitals:    21 0639   Weight: 73.8 kg (162 lb 9.6 oz)     Vital Signs with Ranges  Temp:  [98  F (36.7   C)-98.9  F (37.2  C)] 98.3  F (36.8  C)  Pulse:  [56-79] 72  Resp:  [14-16] 16  BP: (122-183)/(71-97) 130/72  I/O last 3 completed shifts:  In: 1598.75 [P.O.:800; I.V.:798.75]  Out: 2000 [Urine:2000]    Constitutional: Awake, alert, cooperative, no apparent distress. Non-toxic. Appears stated age.   HEENT: Atraumatic. Normocephalic. Conjunctiva non-injected. Sclera anicteric. MMM. Respiratory: Moves air bilaterally. Clear to auscultation bilaterally, no crackles or wheezing  Cardiovascular: Regular rate and rhythm, normal S1 and S2, and no murmur noted  GI: Normal bowel sounds, soft, non-distended, non-tender. Defer /post-partum exam to OB  Skin/Integumen: No rashes, no cyanosis, no edema    Medications     labetalol (NORMODYNE/TRANDATE) algorithm-medication instruction       lactated ringers Stopped (06/21/21 0945)     oxytocin in 0.9% NaCl         acetaminophen  975 mg Oral Q6H     citalopram  10 mg Oral Daily     ibuprofen  800 mg Oral Q6H     insulin aspart  1-7 Units Subcutaneous BID     Measles, Mumps & Rubella Vac  0.5 mL Subcutaneous Once     senna-docusate  1 tablet Oral BID    Or     senna-docusate  1-2 tablet Oral BID     Tdap (tetanus-diphtheria-acell pertussis)  0.5 mL Intramuscular Once       Data   Recent Labs   Lab 06/22/21  0633 06/21/21  0609 06/20/21  0328   WBC 13.5* 14.4* 13.3*   HGB 10.6* 9.7* 11.7   MCV 81 80 78    231 257     --   --    POTASSIUM 4.5  --   --    CHLORIDE 105  --   --    CO2 25  --   --    BUN 14  --   --    CR 0.74 0.63 0.64   ANIONGAP 6  --   --    ZABRINA 8.8  --   --    GLC 94  --   --    ALBUMIN 2.4*  --   --    PROTTOTAL 6.0*  --   --    BILITOTAL 0.3  --   --    ALKPHOS 135  --   --    ALT 19 17 22   AST 20 21 13     No results found for this or any previous visit (from the past 24 hour(s)).

## 2021-06-23 VITALS
HEART RATE: 68 BPM | SYSTOLIC BLOOD PRESSURE: 152 MMHG | RESPIRATION RATE: 16 BRPM | OXYGEN SATURATION: 100 % | WEIGHT: 162.6 LBS | BODY MASS INDEX: 29.74 KG/M2 | DIASTOLIC BLOOD PRESSURE: 89 MMHG | TEMPERATURE: 98.2 F

## 2021-06-23 LAB
ALBUMIN SERPL-MCNC: 2.5 G/DL (ref 3.4–5)
ALP SERPL-CCNC: 131 U/L (ref 40–150)
ALT SERPL W P-5'-P-CCNC: 24 U/L (ref 0–50)
ANION GAP SERPL CALCULATED.3IONS-SCNC: 6 MMOL/L (ref 3–14)
AST SERPL W P-5'-P-CCNC: 21 U/L (ref 0–45)
BILIRUB SERPL-MCNC: 0.4 MG/DL (ref 0.2–1.3)
BUN SERPL-MCNC: 11 MG/DL (ref 7–30)
CALCIUM SERPL-MCNC: 8.2 MG/DL (ref 8.5–10.1)
CHLORIDE SERPL-SCNC: 100 MMOL/L (ref 94–109)
CO2 SERPL-SCNC: 29 MMOL/L (ref 20–32)
CREAT SERPL-MCNC: 0.71 MG/DL (ref 0.52–1.04)
ERYTHROCYTE [DISTWIDTH] IN BLOOD BY AUTOMATED COUNT: 15.1 % (ref 10–15)
GFR SERPL CREATININE-BSD FRML MDRD: >90 ML/MIN/{1.73_M2}
GLUCOSE SERPL-MCNC: 129 MG/DL (ref 70–99)
HCT VFR BLD AUTO: 34.6 % (ref 35–47)
HGB BLD-MCNC: 11 G/DL (ref 11.7–15.7)
MCH RBC QN AUTO: 25.7 PG (ref 26.5–33)
MCHC RBC AUTO-ENTMCNC: 31.8 G/DL (ref 31.5–36.5)
MCV RBC AUTO: 81 FL (ref 78–100)
PLATELET # BLD AUTO: 286 10E9/L (ref 150–450)
POTASSIUM SERPL-SCNC: 3.8 MMOL/L (ref 3.4–5.3)
PROT SERPL-MCNC: 6.2 G/DL (ref 6.8–8.8)
RBC # BLD AUTO: 4.28 10E12/L (ref 3.8–5.2)
SODIUM SERPL-SCNC: 135 MMOL/L (ref 133–144)
WBC # BLD AUTO: 10.9 10E9/L (ref 4–11)

## 2021-06-23 PROCEDURE — 250N000013 HC RX MED GY IP 250 OP 250 PS 637: Performed by: OBSTETRICS & GYNECOLOGY

## 2021-06-23 PROCEDURE — 85027 COMPLETE CBC AUTOMATED: CPT | Performed by: OBSTETRICS & GYNECOLOGY

## 2021-06-23 PROCEDURE — 99232 SBSQ HOSP IP/OBS MODERATE 35: CPT | Performed by: INTERNAL MEDICINE

## 2021-06-23 PROCEDURE — 258N000003 HC RX IP 258 OP 636: Performed by: OBSTETRICS & GYNECOLOGY

## 2021-06-23 PROCEDURE — 250N000013 HC RX MED GY IP 250 OP 250 PS 637: Performed by: FAMILY MEDICINE

## 2021-06-23 PROCEDURE — 80053 COMPREHEN METABOLIC PANEL: CPT | Performed by: OBSTETRICS & GYNECOLOGY

## 2021-06-23 PROCEDURE — 250N000011 HC RX IP 250 OP 636: Performed by: OBSTETRICS & GYNECOLOGY

## 2021-06-23 PROCEDURE — 36415 COLL VENOUS BLD VENIPUNCTURE: CPT | Performed by: OBSTETRICS & GYNECOLOGY

## 2021-06-23 RX ORDER — OXYCODONE HYDROCHLORIDE 5 MG/1
5 TABLET ORAL EVERY 6 HOURS PRN
Qty: 12 TABLET | Refills: 0 | Status: SHIPPED | OUTPATIENT
Start: 2021-06-23 | End: 2021-08-09

## 2021-06-23 RX ORDER — NIFEDIPINE 30 MG/1
30 TABLET, EXTENDED RELEASE ORAL DAILY
Qty: 14 TABLET | Refills: 1 | Status: SHIPPED | OUTPATIENT
Start: 2021-06-23 | End: 2021-09-30

## 2021-06-23 RX ADMIN — OXYCODONE HYDROCHLORIDE 5 MG: 5 TABLET ORAL at 07:33

## 2021-06-23 RX ADMIN — IBUPROFEN 800 MG: 800 TABLET, FILM COATED ORAL at 01:40

## 2021-06-23 RX ADMIN — CITALOPRAM HYDROBROMIDE 10 MG: 10 TABLET ORAL at 10:45

## 2021-06-23 RX ADMIN — NIFEDIPINE 30 MG: 30 TABLET, FILM COATED, EXTENDED RELEASE ORAL at 10:45

## 2021-06-23 RX ADMIN — IBUPROFEN 800 MG: 800 TABLET, FILM COATED ORAL at 14:00

## 2021-06-23 RX ADMIN — OXYCODONE HYDROCHLORIDE 5 MG: 5 TABLET ORAL at 04:41

## 2021-06-23 RX ADMIN — OXYCODONE HYDROCHLORIDE 5 MG: 5 TABLET ORAL at 00:15

## 2021-06-23 RX ADMIN — IBUPROFEN 800 MG: 800 TABLET, FILM COATED ORAL at 10:44

## 2021-06-23 RX ADMIN — ACETAMINOPHEN 975 MG: 325 TABLET, FILM COATED ORAL at 10:45

## 2021-06-23 RX ADMIN — ACETAMINOPHEN 975 MG: 325 TABLET, FILM COATED ORAL at 14:00

## 2021-06-23 RX ADMIN — MAGNESIUM SULFATE HEPTAHYDRATE 2 G/HR: 40 INJECTION, SOLUTION INTRAVENOUS at 04:42

## 2021-06-23 RX ADMIN — ACETAMINOPHEN 975 MG: 325 TABLET, FILM COATED ORAL at 01:41

## 2021-06-23 RX ADMIN — SODIUM CHLORIDE, POTASSIUM CHLORIDE, SODIUM LACTATE AND CALCIUM CHLORIDE 75 ML/HR: 600; 310; 30; 20 INJECTION, SOLUTION INTRAVENOUS at 07:05

## 2021-06-23 RX ADMIN — DOCUSATE SODIUM AND SENNOSIDES 1 TABLET: 8.6; 5 TABLET, FILM COATED ORAL at 10:44

## 2021-06-23 NOTE — PLAN OF CARE
"Patient ambulated to bathroom, she reported feeling light headed with wave of nausea with ambulation.  She also reported feeling like, when in bed over past 15 minutes, her feet/lower legs were twitching.      Vital signs assessed both pre and post void with immediate post void BP elevated at 145/95.  O2 sats  on room air, LS clear and equal bilaterally.  Normal reflexes noted with no beats of clonus present.  Patient has persistent HA that she reports having been present all day with no change in severity.  She denies visual changes.  Edema not present.    She is voiding large amounts.      Patient is tearful and now on the phone with .  Verbalization encouraged and patient states 'there is nothing wrong with me.  I am just overwhelmed.  Why does everyone keep asking me what is wrong!\"  Reassurance provided, pain medication offered and given, plan of care reviewed.      Will continue to monitor closely.      "

## 2021-06-23 NOTE — LACTATION NOTE
Lactation in. [place] blood pressures elevated, will pump when feeling better. Knows to call for assistance.

## 2021-06-23 NOTE — DISCHARGE SUMMARY
[facility] Obstetrics Post-Op / Progress Note         Assessment and Plan:    Assessment:   Post-operative day #3  Low transverse repeat  section  Post-partum tubal removal  L&D complications: Repeat  section  Request for permanent sterilization  Pre eclampsia w/ severe features (severe range BP)      Clean wound without signs of infection.  No immediate surgical complications identified.  No excessive bleeding  Pain well-controlled.  BPs satisfactorily controlled on po Procardia 30 XL  GDM  Alpha thalassemia      Plan:   Discharge to home - BP control not optimized but patient strongly desires discharge and will monitor with home BP  Procardia 30XL daily   Oxycodone #12  Tylenol prn  Needs 2hr GTT PP           Interval History:   Doing well.  Pain is well-controlled.  No fevers.  No history of wound drainage, warmth or significant erythema.  Good appetite.  Denies chest pain, shortness of breath, nausea or vomiting.  Ambulatory.  Bottlefeeding.          Significant Problems:      Past Medical History:   Diagnosis Date     Depressive disorder      Diabetes (H)     Gestational     Hypertension     pre-e with first      PONV (postoperative nausea and vomiting)              Review of Systems:    The patient denies any chest pain, shortness of breath, excessive pain, fever, chills, purulent drainage from the wound, nausea or vomiting.          Medications:   All medications related to the patient's surgery have been reviewed          Physical Exam:     All vitals stable  Patient Vitals for the past 24 hrs:   BP Temp Temp src Pulse Resp SpO2   21 1600 (!) 152/89 98.2  F (36.8  C) Oral 68 16 --   21 1400 (!) 154/89 -- -- 69 -- --   21 1100 (!) 144/92 98.6  F (37  C) Oral -- 16 --   21 0730 (!) 143/76 97.8  F (36.6  C) Oral 67 -- 100 %   21 0508 136/77 97.8  F (36.6  C) Oral 79 16 100 %   21 0210 126/70 -- -- 84 16 100 %   21 0145 (!) 145/95 -- -- -- 18 100 %    06/23/21 0141 -- -- -- -- -- 99 %   06/23/21 0010 136/76 97.5  F (36.4  C) Oral 76 16 100 %   06/22/21 2130 133/71 -- -- -- 16 99 %   06/22/21 2030 (!) 145/87 -- -- 69 18 100 %   06/22/21 1930 135/73 -- -- 69 18 100 %   06/22/21 1900 138/74 -- -- 74 18 --   06/22/21 1830 138/82 -- -- 73 16 --   06/22/21 1825 (!) 144/74 -- -- 73 16 --   06/22/21 1820 (!) 144/69 -- -- 68 16 --   06/22/21 1815 (!) 140/88 -- -- 71 16 --   06/22/21 1810 138/74 -- -- 69 16 --   06/22/21 1805 (!) 137/92 -- -- 69 16 --   06/22/21 1800 (!) 153/85 -- -- 65 16 --   06/22/21 1744 (!) 165/85 -- -- 83 16 --   06/22/21 1734 (!) 157/85 -- -- 71 16 --   06/22/21 1720 (!) 173/86 -- -- 68 -- --     Wound clean and dry with minimal or no drainage.  Surrounding skin with minimal erythema.  Ext NT          Data:     Lab Results   Component Value Date    WBC 10.9 06/23/2021    HGB 11.0 (L) 06/23/2021    HCT 34.6 (L) 06/23/2021     06/23/2021     06/23/2021    POTASSIUM 3.8 06/23/2021    CHLORIDE 100 06/23/2021    CO2 29 06/23/2021    BUN 11 06/23/2021    CR 0.71 06/23/2021     (H) 06/23/2021    TROPI <0.015 02/13/2021    AST 21 06/23/2021    ALT 24 06/23/2021    ALKPHOS 131 06/23/2021    BILITOTAL 0.4 06/23/2021     -    Noe Null MD  6/23/2021 5:07 PM

## 2021-06-23 NOTE — PLAN OF CARE
Severe range blood pressures this evening; MD notified (see provider notification notes).  Pt complaint of headache, denies other PIH symptoms.  Reflexes 1+ and no clonus.  Treatment needed for hypertension and magnesium sulfate restarted per orders (see MAR). Blood pressures now stabilizing.  No complaints of SOB, on continuous pulse oximeter, respirations and heart rate WNL.  Strict I&O maintained with adequate output; voiding without difficulty.  Pain managed with IBU, Tylenol, and oxycodone with stated relief.  Incision open to air, steri strips intact with no drainage or signs of infection noted.  Pre meal blood sugar of 90.  Labs ordered for AM.  Spouse at bedside and supportive.

## 2021-06-23 NOTE — PLAN OF CARE
Reassessment:  Patient is calm and no longer tearful. She states she just 'needed to cry it all out' and feels much better now.  She spoke to her  via phone and states that conversation helped calm her.  She denies any ongoing dizziness, leg twitches, or nausea. She continues to have mild HA.    BP now 126/70, pule 84 O2 sat on room air 100%,  She declines additional pain medication for HA at this time.    She is aware of need to call out for standby assist when ambulating or for baby cares.  Vanna Roth RN on 6/23/2021 at 2:33 AM

## 2021-06-23 NOTE — PROGRESS NOTES
[facility]  Hospitalist Progress Note    Assessment & Plan   Kelsea Adams is a 35 year old female with a history of , Gestational Diabetes, Gestational Hypertension, Alpha Thalassemia who was admitted for urgent  on 2021.     Gestational Diabetes, : A1C = 5.5 on 2021. Diagnosed with gestational diabetes during her first pregnancy.  Postpartum she did not require insulin therapy at that time.  Per pt, she has required insulin therapy throughout her entire second pregnancy.  She had been taking  NPH 24 units before breakfast and 18 units before bedtime.  Her mother and father have type 2 diabetes mellitus and are on oral therapy.    - POCT glucose BID; previously ACHS but given stable BG will reduce checks. sliding scale insulin BID; hypoglycemia protocol. Anticipate no insulin at discharge with BG monitoring at home.   - Recommended oral glucose tolerance test in 6-8 weeks.     Normocytic Anemia  - Baseline Hgb 11-12  - Likely secondary to IVF and post procedure  - Repeat CBC in 1 week     Leukocytosis  - Likely reactive  - Repeat CBC in 1 week     Gestational Hypertension  - Management per OB    FEN: Defer to OB  Activity: As tolerated  DVT Prophylaxis: Pneumatic Compression Devices  Family update: Significant other at bedside.     Code Status: Full Code    Expected discharge: Per OB - plan on discharge  in evening if BP stable. No change in BG recommendations.     Rebeka Fountain, DO    Text Page (7am - 6pm, M-F)    Interval History   Doing well today. Feels better. HA improved. Tolerating diet. No concerns. Discussed with nursing.     -Data reviewed today: I reviewed all new labs and imaging results over the last 24 hours. I personally reviewed     Physical Exam   Temp: 97.8  F (36.6  C) Temp src: Oral BP: (!) 143/76 Pulse: 67   Resp: 16 SpO2: 100 %      Vitals:    21 0639   Weight: 73.8 kg (162 lb 9.6 oz)     Vital Signs with Ranges  Temp:  [97.5   F (36.4  C)-98.2  F (36.8  C)] 97.8  F (36.6  C)  Pulse:  [65-84] 67  Resp:  [16-18] 16  BP: (126-173)/(69-95) 143/76  SpO2:  [99 %-100 %] 100 %  I/O last 3 completed shifts:  In: 3271.73 [P.O.:1680; I.V.:1591.73]  Out: 2875 [Urine:2875]    Constitutional: Awake, alert, cooperative, no apparent distress. Non-toxic. Appears stated age.   HEENT: Atraumatic. Normocephalic. Conjunctiva non-injected. Sclera anicteric. MMM. Respiratory: Moves air bilaterally. Clear to auscultation bilaterally, no crackles or wheezing  Cardiovascular: Regular rate and rhythm, normal S1 and S2, and no murmur noted  GI: Normal bowel sounds, soft, non-distended, non-tender. Defer /post-partum exam to OB  Skin/Integumen: No rashes, no cyanosis, no edema    Medications     labetalol (NORMODYNE/TRANDATE) algorithm-medication instruction       lactated ringers Stopped (06/23/21 0745)     magnesium sulfate Stopped (06/23/21 0745)     oxytocin in 0.9% NaCl         acetaminophen  975 mg Oral Q6H     citalopram  10 mg Oral Daily     ibuprofen  800 mg Oral Q6H     insulin aspart  1-7 Units Subcutaneous BID     Measles, Mumps & Rubella Vac  0.5 mL Subcutaneous Once     NIFEdipine ER OSMOTIC  30 mg Oral Daily     senna-docusate  1 tablet Oral BID    Or     senna-docusate  1-2 tablet Oral BID     Tdap (tetanus-diphtheria-acell pertussis)  0.5 mL Intramuscular Once       Data   Recent Labs   Lab 06/23/21  0656 06/22/21  0633 06/21/21  0609   WBC 10.9 13.5* 14.4*   HGB 11.0* 10.6* 9.7*   MCV 81 81 80    274 231    136  --    POTASSIUM 3.8 4.5  --    CHLORIDE 100 105  --    CO2 29 25  --    BUN 11 14  --    CR 0.71 0.74 0.63   ANIONGAP 6 6  --    ZABRINA 8.2* 8.8  --    * 94  --    ALBUMIN 2.5* 2.4*  --    PROTTOTAL 6.2* 6.0*  --    BILITOTAL 0.4 0.3  --    ALKPHOS 131 135  --    ALT 24 19 17   AST 21 20 21     No results found for this or any previous visit (from the past 24 hour(s)).

## 2021-06-23 NOTE — PROGRESS NOTES
[facility] Obstetrics Post-Op / Progress Note         Assessment and Plan:    Assessment:   Post-operative day #3  Low transverse repeat  section  Post-partum tubal removal  L&D complications: Repeat  section  Request for permanent sterilization  Pre eclampsia w/ severe features      Pain well-controlled.  BPs improved after Procardia 30mg XL initiated 1730 yesterday.  BPs mostly normal range with some mild range        Plan:   Ambulation encouraged  Monitor wound for signs of infection  Pain control measures as needed  Reportable signs and symptoms dicussed with the patient  If AM labs normal will discontinue Mg++   If BPs remain adequately controlled today will likely discharge in PM on Procardia 30XL           Interval History:   Doing well.  Pain is well-controlled.  Headaches improved.  No fevers.  No history of wound drainage, warmth or significant erythema.  Good appetite.  Denies chest pain, shortness of breath, nausea or vomiting.  Ambulatory.           Significant Problems:      Past Medical History:   Diagnosis Date     Depressive disorder      Diabetes (H)     Gestational     Hypertension     pre-e with first      PONV (postoperative nausea and vomiting)              Review of Systems:    The patient denies any chest pain, shortness of breath, excessive pain, fever, chills, purulent drainage from the wound, nausea or vomiting.          Medications:   All medications related to the patient's surgery have been reviewed          Physical Exam:     All vitals stable  Patient Vitals for the past 24 hrs:   BP Temp Temp src Pulse Resp SpO2   21 0508 136/77 97.8  F (36.6  C) Oral 79 16 100 %   21 0210 126/70 -- -- 84 16 100 %   21 0145 (!) 145/95 -- -- -- 18 100 %   21 0141 -- -- -- -- -- 99 %   21 0010 136/76 97.5  F (36.4  C) Oral 76 16 100 %   21 2130 133/71 -- -- -- 16 99 %   21 (!) 145/87 -- -- 69 18 100 %   21 1930 135/73 -- -- 69  18 100 %   06/22/21 1900 138/74 -- -- 74 18 --   06/22/21 1830 138/82 -- -- 73 16 --   06/22/21 1825 (!) 144/74 -- -- 73 16 --   06/22/21 1820 (!) 144/69 -- -- 68 16 --   06/22/21 1815 (!) 140/88 -- -- 71 16 --   06/22/21 1810 138/74 -- -- 69 16 --   06/22/21 1805 (!) 137/92 -- -- 69 16 --   06/22/21 1800 (!) 153/85 -- -- 65 16 --   06/22/21 1744 (!) 165/85 -- -- 83 16 --   06/22/21 1734 (!) 157/85 -- -- 71 16 --   06/22/21 1720 (!) 173/86 -- -- 68 -- --   06/22/21 1710 (!) 167/92 -- -- 67 16 --   06/22/21 1655 (!) 160/77 98.2  F (36.8  C) Oral 65 16 --   06/22/21 1315 (!) 149/83 -- -- 82 -- --   06/22/21 1220 (!) 147/73 -- -- 69 -- --   06/22/21 1112 (!) 146/83 -- -- 83 -- --   06/22/21 1025 (!) 157/87 -- -- 78 -- --   06/22/21 0914 137/71 -- -- 73 -- --   06/22/21 0844 130/72 98.3  F (36.8  C) Oral 72 -- --   06/22/21 0816 124/77 -- -- 79 16 --   06/22/21 0755 122/71 -- -- -- -- --   06/22/21 0745 130/78 -- -- -- -- --   06/22/21 0735 124/74 -- -- -- -- --     Wound clean and dry with minimal or no drainage.  Surrounding skin with minimal erythema.  Ext NT with pneumoboots in place          Data:     Lab Results   Component Value Date    WBC 10.9 06/23/2021    HGB 11.0 (L) 06/23/2021    HCT 34.6 (L) 06/23/2021     06/23/2021     06/23/2021    POTASSIUM 3.8 06/23/2021    CHLORIDE 100 06/23/2021    CO2 PENDING 06/23/2021    BUN PENDING 06/23/2021    CR PENDING 06/23/2021    GLC PENDING 06/23/2021    TROPI <0.015 02/13/2021    AST PENDING 06/23/2021    ALT PENDING 06/23/2021    ALKPHOS PENDING 06/23/2021    BILITOTAL PENDING 06/23/2021     -    Noe Null MD  6/23/2021 7:27 AM

## 2021-06-23 NOTE — DISCHARGE INSTRUCTIONS
Postop  Birth Instructions    Activity       Do not lift more than 10 pounds for 6 weeks after surgery.  Ask family and friends for help when you need it.    No driving until you have stopped taking your pain medications (usually two weeks after surgery).    No heavy exercise or activity for 6 weeks.  Don't do anything that will put a strain on your surgery site.    Don't strain when using the toilet.  Your care team may prescribe a stool softener if you have problems with your bowel movements.     To care for your incision:       Keep the incision clean and dry.    Do not soak your incision in water. No swimming or hot tubs until it has fully healed. You may soak in the bathtub if the water level is below your incision.    Do not use peroxide, gel, cream, lotion, or ointment on your incision.    Adjust your clothes to avoid pressure on your surgery site (check the elastic in your underwear for example).     You may see a small amount of clear or pink drainage and this is normal.  Check with your health care provider:       If the drainage increases or has an odor.    If the incision reddens, you have swelling, or develop a rash.    If you have increased pain and the medicine we prescribed doesn't help.    If you have a fever above 100.4 F (38 C) with or without chills when placing thermometer under your tongue.   The area around your incision (surgery wound), will feel numb.  This is normal. The numbness should go away in less than a year.     Keep your hands clean:  Always wash your hands before touching your incision (surgery wound). This helps reduce your risk of infection. If your hands aren't dirty, you may use an alcohol hand-rub to clean your hands. Keep your nails clean and short.    Call your healthcare provider if you have any of these symptoms:       You soak a sanitary pad with blood within 1 hour, or you see blood clots larger than a golf ball.    Bleeding that lasts more than 6  weeks.    Vaginal discharge that smells bad.    Severe pain, cramping or tenderness in your lower belly area.    A need to urinate more frequently (use the toilet more often), more urgently (use the toilet very quickly), or it burns when you urinate.    Nausea and vomiting.    Redness, swelling or pain around a vein in your leg.    Problems breastfeeding or a red or painful area on your breast.    Chest pain and cough or are gasping for air.    Problems with coping with sadness, anxiety or depression. If you have concerns about hurting yourself or the baby, call your provider immediately.      You have questions or concerns after you return home.     Preeclampsia   Call your doctor right away if you have any of the following:  - Edema (swelling) in your face or hands  - Rapid weight gain-about 1 pound or more in a day  - Headache  - Abdominal pain on your right side  - Vision problems (flashes or spots)  -           You have questions or concerns once you return home.

## 2021-06-23 NOTE — PLAN OF CARE
BPs stable in 140-150s/80-90s. Denies all symptoms. Will discharge home and continue on Procardia 30mg. Home BP monitor given and encouraged to check BPs twice daily. Discharge instructions reviewed and all questions answered. Home with baby at 1815.

## 2021-06-23 NOTE — PLAN OF CARE
MD at bedside for evaluation. Labs and BPs reviewed. Orders to discontinue mag. If pt remains stable can discharge to home after dinner this evening. Orders to discontinue blood sugar monitoring. Pt denies any QUIROS or other PreE symptoms. BPs and labs WDL at this time. Reflexes normal, no clonus. Ambulation encouraged.

## 2021-06-23 NOTE — PROVIDER NOTIFICATION
"   06/23/21 1600   Provider Notification   Provider Name/Title Rosalia Tellez   Method of Notification Phone   Request Evaluate-Remote   Notification Reason Other   Pt just completed EPDS with score of 14. Pt stated she restarted Celexa one month ago and is stating to see improvements. Pt stated she is aware of s/s of anxiety and depression and feels like she has supportive family that would help if needed. Encouraged pt to retest in a week and contact OB if any worsening symptoms. Social Rosalinda Lindsey notified. Pamphlet \"Dep or Anxiety During and After Pregnancy\" given to pt.   "

## 2021-08-09 ENCOUNTER — PRENATAL OFFICE VISIT (OUTPATIENT)
Dept: OBGYN | Facility: CLINIC | Age: 36
End: 2021-08-09
Payer: COMMERCIAL

## 2021-08-09 VITALS
HEART RATE: 76 BPM | WEIGHT: 157.3 LBS | BODY MASS INDEX: 28.77 KG/M2 | DIASTOLIC BLOOD PRESSURE: 88 MMHG | SYSTOLIC BLOOD PRESSURE: 132 MMHG

## 2021-08-09 DIAGNOSIS — O13.3 GESTATIONAL HYPERTENSION WITHOUT SIGNIFICANT PROTEINURIA IN THIRD TRIMESTER: ICD-10-CM

## 2021-08-09 DIAGNOSIS — G47.9 DIFFICULTY SLEEPING: ICD-10-CM

## 2021-08-09 DIAGNOSIS — Z98.891 S/P CESAREAN SECTION: ICD-10-CM

## 2021-08-09 DIAGNOSIS — R11.0 NAUSEA: ICD-10-CM

## 2021-08-09 PROCEDURE — 99207 PR POST PARTUM EXAM: CPT | Performed by: OBSTETRICS & GYNECOLOGY

## 2021-08-09 RX ORDER — PROMETHAZINE HCL 50 MG
25 TABLET ORAL
Qty: 30 TABLET | Refills: 0 | Status: SHIPPED | OUTPATIENT
Start: 2021-08-09 | End: 2021-09-30

## 2021-08-09 RX ORDER — OXYCODONE HYDROCHLORIDE 5 MG/1
5-10 TABLET ORAL EVERY 6 HOURS PRN
Qty: 12 TABLET | Refills: 0 | Status: SHIPPED | OUTPATIENT
Start: 2021-08-09 | End: 2021-09-30

## 2021-08-09 ASSESSMENT — PATIENT HEALTH QUESTIONNAIRE - PHQ9: SUM OF ALL RESPONSES TO PHQ QUESTIONS 1-9: 8

## 2021-08-09 NOTE — PROGRESS NOTES
6 week Postpartum Visit Note    S:  Kelsea Adams is here for her 6-week postpartum checkup.   States she had elevated home blood pressure readings at home in the 150s systolic. The other day it went as high as 180 systolic in which she remained asymptomatic. She then took her mother's amlodipine and it decreased back down to 150s systolic.   She requests a refill of her oxycodone as she still continues to feel incisional pain.   She is requesting promethazine for difficulty sleeping.     Delivery Date: 2021.    Delivering provider:  Brendan العلي MD.    Type of delivery:  Urgent repeat low transverse  section at 38 weeks gestation for labor symptoms.     Infant gender:  girl, weight 5 pounds 11 oz.  Feeding Method:  Bottlefed.  Complications reported with feeding:  none, infant thriving .    Bleeding:  None.    Menses resumed:  Yes   Bowel/Urinary problems:  No    PHQ-9 score: 8    Contraception Planned:  S/p bilateral salpingectomy at the time of her    She  has not had intercourse since delivery..    Current tobacco use:  No  Hx of Abuse:  No  ================================================================  ROS: 10 point ROS neg other than the symptoms noted above in the HPI.     O:  EXAM:  /88 (BP Location: Left arm, Cuff Size: Adult Regular)   Pulse 76   Wt 71.4 kg (157 lb 4.8 oz)   LMP 2021 (Exact Date)   Breastfeeding No   BMI 28.77 kg/m      General: healthy, alert and no distress  Psych: negative for sleep disturbance, anxiety, nervous breakdown, depression, thoughts of self-harm, thoughts of hurting someone else, agitation and hallucinations  Breasts:  Non-tender  Abdomen: Soft, flat, non-tender  Incision:  well healed   Vulva:  deferred  Vagina:  deferred  Cervix:  deferred.    Uterus:  deferred    Adnexa:  deferred  Recto-vaginal:   deferred    A:   36 year old  s/p RLTCS and bilateral salpingectomy here for 6 weeks postpartum visit. Doing well      P:  Contraception: S/p bilateral salpignectomy  Elevated postpartum blood pressures: Given her hx of chronic hypertension, I strongly recommended that she seek PCP evaluation and management   S/p : Oxycodone refilled  Difficulty sleeping: Promethazine prescribed for PRN use  Feeding: Bottle  Return for annual exam or PRN    Gregoria Kaba MD  [facility]

## 2021-08-09 NOTE — NURSING NOTE
"Chief Complaint   Patient presents with     Postpartum Care     2021  C/S L-trans   Girl 5 lbs 11 ozs        Initial /88 (BP Location: Left arm, Cuff Size: Adult Regular)   Pulse 76   Wt 71.4 kg (157 lb 4.8 oz)   LMP 2021 (Exact Date)   Breastfeeding No   BMI 28.77 kg/m   Estimated body mass index is 28.77 kg/m  as calculated from the following:    Height as of 21: 1.575 m (5' 2\").    Weight as of this encounter: 71.4 kg (157 lb 4.8 oz).  BP completed using cuff size: regular    Questioned patient about current smoking habits.  Pt. has never smoked.          The following HM Due: NONE      Saray Navarrete, SETH on 2021 at 3:28 PM       "

## 2021-09-30 ENCOUNTER — OFFICE VISIT (OUTPATIENT)
Dept: URGENT CARE | Facility: URGENT CARE | Age: 36
End: 2021-09-30
Payer: COMMERCIAL

## 2021-09-30 VITALS
SYSTOLIC BLOOD PRESSURE: 140 MMHG | RESPIRATION RATE: 20 BRPM | OXYGEN SATURATION: 99 % | HEART RATE: 74 BPM | BODY MASS INDEX: 28.9 KG/M2 | WEIGHT: 158 LBS | DIASTOLIC BLOOD PRESSURE: 90 MMHG | TEMPERATURE: 98.7 F

## 2021-09-30 DIAGNOSIS — M62.838 MUSCLE SPASM: ICD-10-CM

## 2021-09-30 DIAGNOSIS — I10 ESSENTIAL HYPERTENSION: ICD-10-CM

## 2021-09-30 DIAGNOSIS — R07.9 CHEST PAIN, UNSPECIFIED TYPE: ICD-10-CM

## 2021-09-30 DIAGNOSIS — R03.0 ELEVATED BLOOD PRESSURE READING: ICD-10-CM

## 2021-09-30 DIAGNOSIS — M54.9 UPPER BACK PAIN: Primary | ICD-10-CM

## 2021-09-30 LAB
ANION GAP SERPL CALCULATED.3IONS-SCNC: <1 MMOL/L (ref 3–14)
BUN SERPL-MCNC: 13 MG/DL (ref 7–30)
CALCIUM SERPL-MCNC: 9.8 MG/DL (ref 8.5–10.1)
CHLORIDE BLD-SCNC: 107 MMOL/L (ref 94–109)
CO2 SERPL-SCNC: 30 MMOL/L (ref 20–32)
CREAT SERPL-MCNC: 0.64 MG/DL (ref 0.52–1.04)
D DIMER PPP FEU-MCNC: <0.27 UG/ML FEU (ref 0–0.5)
GFR SERPL CREATININE-BSD FRML MDRD: >90 ML/MIN/1.73M2
GLUCOSE BLD-MCNC: 95 MG/DL (ref 70–99)
POTASSIUM BLD-SCNC: 4.1 MMOL/L (ref 3.4–5.3)
SODIUM SERPL-SCNC: 137 MMOL/L (ref 133–144)
TROPONIN I SERPL-MCNC: <0.015 UG/L (ref 0–0.04)

## 2021-09-30 PROCEDURE — 80048 BASIC METABOLIC PNL TOTAL CA: CPT | Performed by: NURSE PRACTITIONER

## 2021-09-30 PROCEDURE — 84484 ASSAY OF TROPONIN QUANT: CPT | Performed by: NURSE PRACTITIONER

## 2021-09-30 PROCEDURE — 93000 ELECTROCARDIOGRAM COMPLETE: CPT | Performed by: NURSE PRACTITIONER

## 2021-09-30 PROCEDURE — 99204 OFFICE O/P NEW MOD 45 MIN: CPT | Performed by: NURSE PRACTITIONER

## 2021-09-30 PROCEDURE — 85379 FIBRIN DEGRADATION QUANT: CPT | Performed by: NURSE PRACTITIONER

## 2021-09-30 PROCEDURE — 36415 COLL VENOUS BLD VENIPUNCTURE: CPT | Performed by: NURSE PRACTITIONER

## 2021-09-30 RX ORDER — CYCLOBENZAPRINE HCL 10 MG
5-10 TABLET ORAL 3 TIMES DAILY PRN
Qty: 30 TABLET | Refills: 0 | Status: SHIPPED | OUTPATIENT
Start: 2021-09-30 | End: 2021-10-10

## 2021-09-30 RX ORDER — HYDROCHLOROTHIAZIDE 12.5 MG/1
12.5 TABLET ORAL DAILY
Qty: 30 TABLET | Refills: 0 | Status: SHIPPED | OUTPATIENT
Start: 2021-09-30 | End: 2021-11-08

## 2021-09-30 RX ORDER — IBUPROFEN 800 MG/1
800 TABLET, FILM COATED ORAL EVERY 8 HOURS
Qty: 21 TABLET | Refills: 0 | Status: SHIPPED | OUTPATIENT
Start: 2021-09-30 | End: 2021-10-07

## 2021-09-30 NOTE — PATIENT INSTRUCTIONS
Patient Education     Back Spasm (No Trauma)    Spasm of the back muscles can occur after a sudden forceful twisting or bending such as in a car accident. A spasm can also happen after a simple awkward movement, or after lifting something heavy with poor body positioning. In any case, muscle spasm adds to the pain. Sleeping in an awkward position or on a poor quality mattress can also cause this. Some people respond to emotional stress by tensing the muscles of their back.  Pain that continues may need further assessment or other types of treatment such as physical therapy.  You don't always need X-rays for the first assessment of back pain, unless you had a physical injury such as from a car accident or fall. If your pain continues and doesn't respond to medical treatment, X-rays and other tests may then be done.   Home care    As soon as possible, start sitting or walking again. This will help prevent problems from a long bed rest. These problems include muscle weakness, worsening back stiffness and pain, and blood clots in the legs.    When in bed, try to find a position of comfort. A firm mattress is best. Try lying flat on your back with pillows under your knees. You can also try lying on your side with your knees bent up toward your chest and a pillow between your knees.    Don't sit for long periods. Also limit car rides and travel. This puts more stress on the lower back than standing or walking.     During the first 24 to 72 hours after an injury or flare-up, put an ice pack on the painful area for 20 minutes, then remove it for 20 minutes. Do this over a period of 60 to 90 minutes, or several times a day. This will reduce swelling and pain. Always wrap ice packs in a thin towel.    You can start with ice, then switch to heat. Heat from a hot shower, hot bath, or heating pad reduces pain and works well for muscle spasms. Put heat on the painful area for 20 minutes, then remove it for 20 minutes. Do this  over a period of 60 to 90 minutes, or several times a day. Don't sleep on a heating pad. It can burn or damage skin.    Alternate using ice and heat.    Be aware of safe lifting methods. don't lift anything over 15 pounds until all the pain is gone.  Gentle stretching will help your back heal faster. Do this simple routine 2 to 3 times a day until your back is feeling better.    Lie on your back with your knees bent and both feet on the ground.    Slowly raise your left knee to your chest as you flatten your lower back against the floor. Hold for 20 to 30 seconds.    Relax and repeat the exercise with your right knee.    Do 2 to 3 of these exercises for each leg.    Repeat, hugging both knees to your chest at the same time.    Don't bounce, but use a gentle pull.  Medicines  Talk with your doctor before using medicine, especially if you have other medical problems or are taking other medicines.  You may use over-the-counter medicines such as acetaminophen, ibuprofen, or naprosyn to control pain, unless your healthcare provider prescribed another pain medicine. Talk with your healthcare provider if you have a chronic condition such as diabetes, liver or kidney disease, stomach ulcer, or digestive bleeding, or are taking blood thinners.  Be careful if you are given prescription pain medicine, opioids, or medicine for muscle spasm. They can cause drowsiness, and affect your coordination, reflexes, and judgment. Don't drive or operate heavy machinery when taking these medicines. Take pain medicine only as prescribed by your healthcare provider.  Follow-up care  Follow up with your doctor, or as advised. You may need physical therapy or more tests.  If X-rays were taken, they may be reviewed by a radiologist. You will be told of any new findings that may affect your care.  Call   Call if any of these occur:    Trouble breathing    Confusion    Drowsiness or trouble awakening    Fainting or loss of consciousness    Rapid  or very slow heart rate    Loss of bowel or bladder control  When to seek medical advice  Call your healthcare provider right away if any of these occur:    Pain becomes worse or spreads to your legs    Weakness or numbness in one or both legs    Numbness in the groin or genital area    Fever of 100.4 F (38 C) or higher , or as directed by your healthcare provider    Chills    Burning or pain when passing urine  [email or web address] last reviewed this educational content on 11/1/2018 2000-2021 [employer]. All rights reserved. This information is not intended as a substitute for professional medical care. Always follow your healthcare professional's instructions.

## 2021-09-30 NOTE — PROGRESS NOTES
Chief Complaint   Patient presents with     Urgent Care     left shoulder/upper back/head/neck burning pain radiating down left arm and elevated blood pressure readings for a few days. (Denies chest pain).          ICD-10-CM    1. Upper back pain  M54.9 cyclobenzaprine (FLEXERIL) 10 MG tablet     ibuprofen (ADVIL/MOTRIN) 800 MG tablet   2. Elevated blood pressure reading  R03.0 EKG 12-lead complete w/read - Clinics     CBC with platelets and differential   3. Chest pain, unspecified type  R07.9 Troponin I     D dimer, quantitative     CBC with platelets and differential     Basic metabolic panel  (Ca, Cl, CO2, Creat, Gluc, K, Na, BUN)     Troponin I     D dimer, quantitative     Basic metabolic panel  (Ca, Cl, CO2, Creat, Gluc, K, Na, BUN)   4. Muscle spasm  M62.838 cyclobenzaprine (FLEXERIL) 10 MG tablet     ibuprofen (ADVIL/MOTRIN) 800 MG tablet   5. Essential hypertension  I10 hydrochlorothiazide (HYDRODIURIL) 12.5 MG tablet   Chest pain does not appear to be cardiac in nature based on results from today but she does have a history of hypertension as a possible risk factor.  Patient reports her blood pressure at home has been running 170-180 systolic and 100-110 diastolic.  She was on nifedipine while in the hospital just prior to delivery but stopped this once delivery was completed.  Patient has follow-up appointment set up in 5 days with primary care provider is very concerned about her elevated blood pressure in clinic today it is not as elevated but will go ahead and start her on hydrochlorothiazide and baseline labs are obtained.    Will treat upper back neck and chest discomfort as musculoskeletal.  Encouraged her to use ice and/or heat along with prescriptions that were given for muscle relaxant and ibuprofen.  Pain certainly can be a contributor to her hypertension as well.    Medical Decision Making    Differential Diagnosis:  Differential diagnosis for patient's chest pain include but is not limited  to: Acute myocardial infarction, pneumothorax, pleurisy, pericarditis, myocarditis, asthma exacerbation, pneumonia, lung abscess, aortic dissection, thoracic aortic aneurysm, costochondritis, pulmonary embolus, rib fractures, muscular pain, GERD, cholecystitis, pancreatitis, diaphragmatic irritation, muscle spasm.      EKG - Reviewed and interpreted by me appears normal, NSR with no acute changes    Results for orders placed or performed in visit on 09/30/21 (from the past 24 hour(s))   Troponin I   Result Value Ref Range    Troponin I <0.015 0.000 - 0.045 ug/L   D dimer, quantitative   Result Value Ref Range    D-Dimer Quantitative <0.27 0.00 - 0.50 ug/mL FEU    Narrative    This D-dimer assay is intended for use in conjunction with a clinical pretest probability assessment model to exclude pulmonary embolism (PE) and deep venous thrombosis (DVT) in outpatients suspected of PE or DVT. The cut-off value is 0.50 ug/mL FEU.   Basic metabolic panel  (Ca, Cl, CO2, Creat, Gluc, K, Na, BUN)   Result Value Ref Range    Sodium 137 133 - 144 mmol/L    Potassium 4.1 3.4 - 5.3 mmol/L    Chloride 107 94 - 109 mmol/L    Carbon Dioxide (CO2) 30 20 - 32 mmol/L    Anion Gap <1 (L) 3 - 14 mmol/L    Urea Nitrogen 13 7 - 30 mg/dL    Creatinine 0.64 0.52 - 1.04 mg/dL    Calcium 9.8 8.5 - 10.1 mg/dL    Glucose 95 70 - 99 mg/dL    GFR Estimate >90 >60 mL/min/1.73m2       Subjective     Serreg Adams is an 36 year old female who presents to clinic today for pain in the left upper chest into the neck and upper back that occasionally radiates down the left arm.  She has been having these symptoms for a total of 4 days.  At first they were intermittent but the last several days have been continuous pain.  Pain is made worse by turning of the head.  She has had some slight nausea but no vomiting.  She is 6 weeks postpartum but not breast-feeding.    ROS: 10 point ROS neg other than the symptoms noted above in the HPI.       Objective     BP (!) 140/90 (BP Location: Left arm, Patient Position: Chair, Cuff Size: Adult Regular)   Pulse 74   Temp 98.7  F (37.1  C) (Tympanic)   Resp 20   Wt 71.7 kg (158 lb)   SpO2 99%   BMI 28.90 kg/m      Physical Exam         GENERAL APPEARANCE: healthy appearing, alert     EYES: PERRL, EOMI, sclera non-icteric     HENT: oral exam benign, mucus membranes intact, without ulcers or lesions     NECK: no adenopathy or asymmetry, thyroid normal to palpation     RESP: lungs clear to auscultation - no rales, rhonchi or wheezes     CV: regular rates and rhythm, no murmurs, rubs, or gallop     ABDOMEN:  soft, nontender, no HSM or masses and bowel sounds normal     MS: extremities normal- no gross deformities noted; normal muscle tone, except for tenderness over the left trapezius muscle which feels very tight     SKIN: no suspicious lesions or rashes     NEURO: Normal strength and tone, mentation intact and speech normal     PSYCH: normal thought process; no significant mood disturbance    Patient Instructions     Patient Education     Back Spasm (No Trauma)    Spasm of the back muscles can occur after a sudden forceful twisting or bending such as in a car accident. A spasm can also happen after a simple awkward movement, or after lifting something heavy with poor body positioning. In any case, muscle spasm adds to the pain. Sleeping in an awkward position or on a poor quality mattress can also cause this. Some people respond to emotional stress by tensing the muscles of their back.  Pain that continues may need further assessment or other types of treatment such as physical therapy.  You don't always need X-rays for the first assessment of back pain, unless you had a physical injury such as from a car accident or fall. If your pain continues and doesn't respond to medical treatment, X-rays and other tests may then be done.   Home care    As soon as possible, start sitting or walking again. This will help prevent  problems from a long bed rest. These problems include muscle weakness, worsening back stiffness and pain, and blood clots in the legs.    When in bed, try to find a position of comfort. A firm mattress is best. Try lying flat on your back with pillows under your knees. You can also try lying on your side with your knees bent up toward your chest and a pillow between your knees.    Don't sit for long periods. Also limit car rides and travel. This puts more stress on the lower back than standing or walking.     During the first 24 to 72 hours after an injury or flare-up, put an ice pack on the painful area for 20 minutes, then remove it for 20 minutes. Do this over a period of 60 to 90 minutes, or several times a day. This will reduce swelling and pain. Always wrap ice packs in a thin towel.    You can start with ice, then switch to heat. Heat from a hot shower, hot bath, or heating pad reduces pain and works well for muscle spasms. Put heat on the painful area for 20 minutes, then remove it for 20 minutes. Do this over a period of 60 to 90 minutes, or several times a day. Don't sleep on a heating pad. It can burn or damage skin.    Alternate using ice and heat.    Be aware of safe lifting methods. don't lift anything over 15 pounds until all the pain is gone.  Gentle stretching will help your back heal faster. Do this simple routine 2 to 3 times a day until your back is feeling better.    Lie on your back with your knees bent and both feet on the ground.    Slowly raise your left knee to your chest as you flatten your lower back against the floor. Hold for 20 to 30 seconds.    Relax and repeat the exercise with your right knee.    Do 2 to 3 of these exercises for each leg.    Repeat, hugging both knees to your chest at the same time.    Don't bounce, but use a gentle pull.  Medicines  Talk with your doctor before using medicine, especially if you have other medical problems or are taking other medicines.  You may use  over-the-counter medicines such as acetaminophen, ibuprofen, or naprosyn to control pain, unless your healthcare provider prescribed another pain medicine. Talk with your healthcare provider if you have a chronic condition such as diabetes, liver or kidney disease, stomach ulcer, or digestive bleeding, or are taking blood thinners.  Be careful if you are given prescription pain medicine, opioids, or medicine for muscle spasm. They can cause drowsiness, and affect your coordination, reflexes, and judgment. Don't drive or operate heavy machinery when taking these medicines. Take pain medicine only as prescribed by your healthcare provider.  Follow-up care  Follow up with your doctor, or as advised. You may need physical therapy or more tests.  If X-rays were taken, they may be reviewed by a radiologist. You will be told of any new findings that may affect your care.  Call   Call if any of these occur:    Trouble breathing    Confusion    Drowsiness or trouble awakening    Fainting or loss of consciousness    Rapid or very slow heart rate    Loss of bowel or bladder control  When to seek medical advice  Call your healthcare provider right away if any of these occur:    Pain becomes worse or spreads to your legs    Weakness or numbness in one or both legs    Numbness in the groin or genital area    Fever of 100.4 F (38 C) or higher , or as directed by your healthcare provider    Chills    Burning or pain when passing urine  [employer] last reviewed this educational content on 11/1/2018 2000-2021 [employer]. All rights reserved. This information is not intended as a substitute for professional medical care. Always follow your healthcare professional's instructions.               WILFRED Le, CNP  [facility] Provider

## 2021-10-11 ENCOUNTER — HEALTH MAINTENANCE LETTER (OUTPATIENT)
Age: 36
End: 2021-10-11

## 2021-10-29 ENCOUNTER — OFFICE VISIT (OUTPATIENT)
Dept: URGENT CARE | Facility: URGENT CARE | Age: 36
End: 2021-10-29
Payer: COMMERCIAL

## 2021-10-29 VITALS
SYSTOLIC BLOOD PRESSURE: 130 MMHG | BODY MASS INDEX: 28.9 KG/M2 | HEART RATE: 83 BPM | OXYGEN SATURATION: 100 % | DIASTOLIC BLOOD PRESSURE: 80 MMHG | RESPIRATION RATE: 16 BRPM | WEIGHT: 158 LBS

## 2021-10-29 DIAGNOSIS — S61.412A LACERATION OF LEFT HAND WITHOUT FOREIGN BODY, INITIAL ENCOUNTER: Primary | ICD-10-CM

## 2021-10-29 PROCEDURE — 12001 RPR S/N/AX/GEN/TRNK 2.5CM/<: CPT | Performed by: FAMILY MEDICINE

## 2021-10-29 PROCEDURE — 99212 OFFICE O/P EST SF 10 MIN: CPT | Mod: 25 | Performed by: FAMILY MEDICINE
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
## 2021-11-01 DIAGNOSIS — I10 ESSENTIAL HYPERTENSION: ICD-10-CM

## 2021-11-04 ENCOUNTER — TELEPHONE (OUTPATIENT)
Dept: OBGYN | Facility: CLINIC | Age: 36
End: 2021-11-04
Payer: COMMERCIAL

## 2021-11-04 NOTE — TELEPHONE ENCOUNTER
Reason for Call:  Form, our goal is to have forms completed with 72 hours, however, some forms may require a visit or additional information.    Type of letter, form or note:  medical    Who is the form from?: [employer] (if other please explain)    Where did the form come from: form was faxed in    What clinic location was the form placed at?: Internal Medicine    Where the form was placed: Internal Medicine     What number is listed as a contact on the form?: [phone]       Additional comments: Second refill request for hydrochlorothiazide 12.5 MG    Call taken on 11/4/2021 at 4:48 PM by Toshia Honeycutt

## 2021-11-08 RX ORDER — HYDROCHLOROTHIAZIDE 12.5 MG/1
12.5 TABLET ORAL DAILY
Qty: 30 TABLET | Refills: 0 | Status: SHIPPED | OUTPATIENT
Start: 2021-11-08

## 2022-01-30 ENCOUNTER — HEALTH MAINTENANCE LETTER (OUTPATIENT)
Age: 37
End: 2022-01-30

## 2022-02-16 NOTE — PLAN OF CARE
Phone call with Dr. Null. Relayed information per previous note. Reviewed lab results: ROM+ negative and UA WNL x few bacteria and mucous. Reviewed FHT category 1, patient not ctxing. Per communication with MD peraza to discharge patient to home with instructions to f/u as scheduled in clinic and call if sx per AVS discharge instructions.   DISPLAY PLAN FREE TEXT

## 2022-05-22 ENCOUNTER — HEALTH MAINTENANCE LETTER (OUTPATIENT)
Age: 37
End: 2022-05-22

## 2022-07-11 ENCOUNTER — OFFICE VISIT (OUTPATIENT)
Dept: OBGYN | Facility: CLINIC | Age: 37
End: 2022-07-11
Payer: COMMERCIAL

## 2022-07-11 VITALS
BODY MASS INDEX: 29.19 KG/M2 | DIASTOLIC BLOOD PRESSURE: 88 MMHG | SYSTOLIC BLOOD PRESSURE: 138 MMHG | WEIGHT: 159.6 LBS | HEART RATE: 75 BPM

## 2022-07-11 DIAGNOSIS — R53.83 FATIGUE, UNSPECIFIED TYPE: ICD-10-CM

## 2022-07-11 DIAGNOSIS — Z12.4 SCREENING FOR CERVICAL CANCER: ICD-10-CM

## 2022-07-11 DIAGNOSIS — N92.6 IRREGULAR BLEEDING: ICD-10-CM

## 2022-07-11 DIAGNOSIS — Z13.1 SCREENING FOR DIABETES MELLITUS: ICD-10-CM

## 2022-07-11 DIAGNOSIS — R14.0 ABDOMINAL BLOATING: Primary | ICD-10-CM

## 2022-07-11 DIAGNOSIS — R68.89 HEAT INTOLERANCE: ICD-10-CM

## 2022-07-11 LAB
ERYTHROCYTE [DISTWIDTH] IN BLOOD BY AUTOMATED COUNT: 13.4 % (ref 10–15)
HBA1C MFR BLD: 5.8 % (ref 0–5.6)
HCT VFR BLD AUTO: 42.5 % (ref 35–47)
HGB BLD-MCNC: 13.8 G/DL (ref 11.7–15.7)
MCH RBC QN AUTO: 25.6 PG (ref 26.5–33)
MCHC RBC AUTO-ENTMCNC: 32.5 G/DL (ref 31.5–36.5)
MCV RBC AUTO: 79 FL (ref 78–100)
PLATELET # BLD AUTO: 246 10E3/UL (ref 150–450)
RBC # BLD AUTO: 5.4 10E6/UL (ref 3.8–5.2)
WBC # BLD AUTO: 6.6 10E3/UL (ref 4–11)

## 2022-07-11 PROCEDURE — 87624 HPV HI-RISK TYP POOLED RSLT: CPT | Performed by: OBSTETRICS & GYNECOLOGY

## 2022-07-11 PROCEDURE — G0124 SCREEN C/V THIN LAYER BY MD: HCPCS | Performed by: PATHOLOGY

## 2022-07-11 PROCEDURE — 36415 COLL VENOUS BLD VENIPUNCTURE: CPT | Performed by: OBSTETRICS & GYNECOLOGY

## 2022-07-11 PROCEDURE — 85027 COMPLETE CBC AUTOMATED: CPT | Performed by: OBSTETRICS & GYNECOLOGY

## 2022-07-11 PROCEDURE — 84443 ASSAY THYROID STIM HORMONE: CPT | Performed by: OBSTETRICS & GYNECOLOGY

## 2022-07-11 PROCEDURE — G0145 SCR C/V CYTO,THINLAYER,RESCR: HCPCS | Performed by: OBSTETRICS & GYNECOLOGY

## 2022-07-11 PROCEDURE — 82728 ASSAY OF FERRITIN: CPT | Performed by: OBSTETRICS & GYNECOLOGY

## 2022-07-11 PROCEDURE — 84439 ASSAY OF FREE THYROXINE: CPT | Performed by: OBSTETRICS & GYNECOLOGY

## 2022-07-11 PROCEDURE — 99215 OFFICE O/P EST HI 40 MIN: CPT | Performed by: OBSTETRICS & GYNECOLOGY

## 2022-07-11 PROCEDURE — 83036 HEMOGLOBIN GLYCOSYLATED A1C: CPT | Performed by: OBSTETRICS & GYNECOLOGY

## 2022-07-11 ASSESSMENT — PATIENT HEALTH QUESTIONNAIRE - PHQ9: SUM OF ALL RESPONSES TO PHQ QUESTIONS 1-9: 15

## 2022-07-11 NOTE — PROGRESS NOTES
Abdominal bloating  Urge urinary incontinence  Heat intolerance   Diabetes mellitus screening  Depression       Ms. 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   Firstly, she complains of urge urinary incontinence. She has noticed this since 2021.   She also complaints of abdominal bloating symptoms despite eating a relatively healthy diet. She also endorses fatigue and heat intolerance. She inquires if it is possible for her to be experiencing perimenopause as her heat intolerance causes her to be excessively sweat and she seeks interventions to cool her down including going outside in the cold when it was late Fall/early Winter and take off the sheets in bed at night. She also reports for the last 2 months menses that occures twice a month. Each menses episode lasts a few days and is not associated with cramping, but is heavy in flow.   She also wants to be screened for diabetes mellitus given that she was a gestational diabetic (GDMA2) during her pregnancy.   Lastly, she is currently on max dose of Celexa at 40 mg daily dosing. Although, she denies suicidal/homidical ideation, she still reports struggling with depression. She inquires about switching to another anti-depressant.       Past Medical History:   Diagnosis Date     Depressive disorder      Diabetes (H)     Gestational     Hypertension     pre-e with first      PONV (postoperative nausea and vomiting)        Past Surgical History:   Procedure Laterality Date     C/SECTION, LOW TRANSVERSE      LTCS x 1 ([facility])      SECTION, TUBAL LIGATION, COMBINED N/A 2021    Procedure:  SECTION, WITH POSTPARTUM TUBAL LIGATION;  Surgeon: Brendan العلي MD;  Location:  [facility]     LAPAROSCOPIC CHOLECYSTECTOMY N/A 2021    Procedure: CHOLECYSTECTOMY, LAPAROSCOPIC;  Surgeon: David Fatima MD;  Location:  OR       Current Outpatient Medications   Medication     citalopram  "(CELEXA) 10 MG tablet     blood glucose monitoring (SOFTCLIX) lancets     hydrochlorothiazide (HYDRODIURIL) 12.5 MG tablet     insulin syringe-needle U-100 (31G X 5/16\" 0.5 ML) 31G X 5/16\" 0.5 ML miscellaneous     Prenatal Vit-Fe Fumarate-FA (PRENATAL MULTIVITAMIN W/IRON) 27-0.8 MG tablet     No current facility-administered medications for this visit.       No Known Allergies    Social History     Tobacco Use     Smoking status: Former Smoker     Smokeless tobacco: Never Used   Substance Use Topics     Alcohol use: Not Currently     Drug use: Never       Family History   Problem Relation Age of Onset     Hypertension Mother      Diabetes Mother      Hypertension Father      Diabetes Father      No Known Problems Sister      No Known Problems Brother      No Known Problems Sister        ROS: 10 point review of systems negative except for pertinent positives stated in the HPI      Exam:   /88 (BP Location: Left arm, Cuff Size: Adult Regular)   Pulse 75   Wt 72.4 kg (159 lb 9.6 oz)   LMP 06/19/2022 (Exact Date)   Breastfeeding No   BMI 29.19 kg/m    General Appearance: Well nourished, well developed female, NAD, AOx3  Neurological: Mental Status Normal  Skin: Normal skin turgor  HEET: Atraumatic, normocephalic  Abdomen: Soft, NT, ND, no masses  Pelvic: Normal external female genitalia.  No external lesions, normal hair distribution, no adenopathy. Speculum exam reveals vaginal epithelium well rugated with no abnormal discharge. Cervix appears smooth, pink, with no visible lesions. Bimanual exam reveals normal size uterus, anteverted, non-tender, and mobile. No adnexal masses or tenderness. No cervical motion tenderness.     A/P:   1) Abdominal bloating   -- we discussed that from a gynecological standpoint, we can get a pelvic ultrasound to assess if there is an etiology   -- patient requests a weight loss medication and given that this request is out of my scope of practice/expertise, I recommended that " she follow-up with PCP for further management  -- nutrition referral offered but patient declines    2) Urge urinary incontinence   -- reviewed pathophysiology of this condition   -- reviewed treatment options including life style modifications such as timed urinary voids and avoiding triggering factors such as caffeine intake and spicy foods; also reviewed antimuscarinic medication ie Detrol and its mechanism of action but also reviewed undesirable side effects ie dry mouth, dry eyes  -- at this time, patient elects for lifestyle modification approach    3) Heat intolerance   -- I expressed to patient my low suspicion that she is not likely in perimenopause given her young age  -- TSH, free T4 reflex ordered to rule thyroid disease as a potential cause of her symptoms    4) Irregular menses   -- has only been occurring for a acute period of time; recommended that she wait for a total of 6 months and then consider birth control therapy for regulation of her menses     5) Diabetes mellitus screening  -- Hgb A1C ordered     6) Depression  -- in light of her worsening symptoms, I recommended that she return to her PCP for further management  -- suicidal/homicidal ideations reviewed    7) Pap smear screening  -- Pap smear collected    Total time spent was 40 minutes on the date of the encounter in chart review, patient visit, review of tests, documentation and counseling on the above medical condition.    Gregoria Kaba MD  [facility]

## 2022-07-11 NOTE — NURSING NOTE
"Chief Complaint   Patient presents with     Bladder Problems     Leaking and urgency concerns        Initial /88 (BP Location: Left arm, Cuff Size: Adult Regular)   Pulse 75   Wt 72.4 kg (159 lb 9.6 oz)   LMP 2022 (Exact Date)   Breastfeeding No   BMI 29.19 kg/m   Estimated body mass index is 29.19 kg/m  as calculated from the following:    Height as of 21: 1.575 m (5' 2\").    Weight as of this encounter: 72.4 kg (159 lb 9.6 oz).  BP completed using cuff size: regular    Questioned patient about current smoking habits.  Pt. has never smoked.          The following HM Due: NONE      Saray Navarrete, SETH on 2022 at 1:10 PM       "

## 2022-07-13 LAB
FERRITIN SERPL-MCNC: 22 NG/ML (ref 12–150)
T4 FREE SERPL-MCNC: 1.03 NG/DL (ref 0.76–1.46)
TSH SERPL DL<=0.005 MIU/L-ACNC: 0.38 MU/L (ref 0.4–4)

## 2022-07-18 LAB
BKR LAB AP GYN ADEQUACY: ABNORMAL
BKR LAB AP GYN INTERPRETATION: ABNORMAL
BKR LAB AP HPV REFLEX: ABNORMAL
BKR LAB AP LMP: ABNORMAL
BKR LAB AP PREVIOUS ABNORMAL: ABNORMAL
PATH REPORT.COMMENTS IMP SPEC: ABNORMAL
PATH REPORT.COMMENTS IMP SPEC: ABNORMAL
PATH REPORT.RELEVANT HX SPEC: ABNORMAL

## 2022-07-20 PROBLEM — R87.610 ASCUS OF CERVIX WITH NEGATIVE HIGH RISK HPV: Status: ACTIVE | Noted: 2022-07-11

## 2022-07-20 LAB
HUMAN PAPILLOMA VIRUS 16 DNA: NEGATIVE
HUMAN PAPILLOMA VIRUS 18 DNA: NEGATIVE
HUMAN PAPILLOMA VIRUS FINAL DIAGNOSIS: NORMAL
HUMAN PAPILLOMA VIRUS OTHER HR: NEGATIVE

## 2022-09-24 ENCOUNTER — HEALTH MAINTENANCE LETTER (OUTPATIENT)
Age: 37
End: 2022-09-24

## 2023-01-29 ENCOUNTER — HEALTH MAINTENANCE LETTER (OUTPATIENT)
Age: 38
End: 2023-01-29

## 2023-05-08 ENCOUNTER — HEALTH MAINTENANCE LETTER (OUTPATIENT)
Age: 38
End: 2023-05-08

## 2023-10-14 ENCOUNTER — HEALTH MAINTENANCE LETTER (OUTPATIENT)
Age: 38
End: 2023-10-14

## 2024-03-02 ENCOUNTER — HEALTH MAINTENANCE LETTER (OUTPATIENT)
Age: 39
End: 2024-03-02

## 2025-03-15 ENCOUNTER — HEALTH MAINTENANCE LETTER (OUTPATIENT)
Age: 40
End: 2025-03-15

## (undated) DEVICE — DECANTER VIAL 2006S

## (undated) DEVICE — SOL WATER IRRIG 1000ML BOTTLE 2F7114

## (undated) DEVICE — SU VICRYL 0 UR-6 27" J603H

## (undated) DEVICE — GLOVE PROTEXIS BLUE W/NEU-THERA 7.0  2D73EB70

## (undated) DEVICE — SU PDS II 0 CTX 60" Z990G

## (undated) DEVICE — ESU LIGASURE OPEN SEALER/DIVIDER SM JAW 16.5MM LF1212A

## (undated) DEVICE — CATH TRAY FOLEY SURESTEP 16FR DRAIN BAG STATOCK A899916

## (undated) DEVICE — ENDO TROCAR SLEEVE KII Z-THREADED 05X100MM CTS02

## (undated) DEVICE — STOCKING SLEEVE VASOPRESS COMPRESSION CALF MED VP501M

## (undated) DEVICE — LINEN TOWEL PACK X10 5473

## (undated) DEVICE — LINEN FULL SHEET 5511

## (undated) DEVICE — LINEN HALF SHEET 5512

## (undated) DEVICE — ENDO TROCAR FIRST ENTRY KII FIOS Z-THRD 05X100MM CTF03

## (undated) DEVICE — GLOVE PROTEXIS BLUE W/NEU-THERA 7.5  2D73EB75

## (undated) DEVICE — SOL NACL 0.9% IRRIG 1000ML BOTTLE 2F7124

## (undated) DEVICE — SU VICRYL 2-0 CT-1 36" J345H

## (undated) DEVICE — ENDO SCOPE WARMER LF TM500

## (undated) DEVICE — ESU GROUND PAD UNIVERSAL W/O CORD

## (undated) DEVICE — CLIP APPLIER ENDO 5MM M/L LIGAMAX EL5ML

## (undated) DEVICE — BAG CLEAR TRASH 1.3M 39X33" P4040C

## (undated) DEVICE — TRANSFER DEVICE BLOOD NDL HOLDER 364880

## (undated) DEVICE — PREP CHLORAPREP 26ML TINTED ORANGE  260815

## (undated) DEVICE — GLOVE PROTEXIS W/NEU-THERA 6.5  2D73TE65

## (undated) DEVICE — SOL ADH LIQUID BENZOIN SWAB 0.6ML C1544

## (undated) DEVICE — CAP BABY PINK/BLUE IC-2

## (undated) DEVICE — SOL NACL 0.9% INJ 1000ML BAG 2B1324X

## (undated) DEVICE — ENDO TROCAR FIRST ENTRY KII FIOS Z-THRD 11X100MM CTF33

## (undated) DEVICE — PACK C-SECTION LF PL15OTA83B

## (undated) DEVICE — LINEN TOWEL PACK X5 5464

## (undated) DEVICE — SU MONOCRYL 3-0 SH 27" Y316H

## (undated) DEVICE — SU MONOCRYL 1 CT-1 36" Y947H

## (undated) DEVICE — SU VICRYL 4-0 PS-2 18" UND J496H

## (undated) DEVICE — ENDO POUCH UNIV RETRIEVAL SYSTEM INZII 10MM CD001

## (undated) DEVICE — DRSG ABDOMINAL 07 1/2X8" 7197D

## (undated) DEVICE — GLOVE PROTEXIS W/NEU-THERA 7.5  2D73TE75

## (undated) DEVICE — LINEN BABY BLANKET 5434

## (undated) DEVICE — SU MONOCRYL 4-0 PS-2 18" UND Y496G

## (undated) DEVICE — ESU GROUND PAD ADULT W/CORD E7507

## (undated) DEVICE — ESU PENCIL SMOKE EVAC W/ROCKER SWITCH 0703-047-000

## (undated) DEVICE — GLOVE PROTEXIS MICRO 7.5  2D73PM75

## (undated) DEVICE — STPL SKIN SUBCUTICULAR INSORB  2030

## (undated) DEVICE — DRSG STERI STRIP 1/2X4" R1547

## (undated) DEVICE — SUCTION IRR STRYKERFLOW II W/TIP 250-070-520

## (undated) DEVICE — DEVICE SUTURE GRASPER TROCAR CLOSURE 14GA PMITCSG

## (undated) DEVICE — PACK LAP CHOLE SLC15LCFSD

## (undated) DEVICE — ESU HOLDER LAP INST DISP PURPLE LONG 330MM H-PRO-330

## (undated) DEVICE — SU PLAIN 3-0 CT 27" 852H

## (undated) RX ORDER — PROPOFOL 10 MG/ML
INJECTION, EMULSION INTRAVENOUS
Status: DISPENSED
Start: 2021-02-13

## (undated) RX ORDER — FENTANYL CITRATE 50 UG/ML
INJECTION, SOLUTION INTRAMUSCULAR; INTRAVENOUS
Status: DISPENSED
Start: 2021-02-13

## (undated) RX ORDER — HYDROMORPHONE HYDROCHLORIDE 1 MG/ML
INJECTION, SOLUTION INTRAMUSCULAR; INTRAVENOUS; SUBCUTANEOUS
Status: DISPENSED
Start: 2021-02-13

## (undated) RX ORDER — FENTANYL CITRATE-0.9 % NACL/PF 10 MCG/ML
PLASTIC BAG, INJECTION (ML) INTRAVENOUS
Status: DISPENSED
Start: 2021-06-20

## (undated) RX ORDER — KETOROLAC TROMETHAMINE 30 MG/ML
INJECTION, SOLUTION INTRAMUSCULAR; INTRAVENOUS
Status: DISPENSED
Start: 2021-06-20

## (undated) RX ORDER — OXYTOCIN/0.9 % SODIUM CHLORIDE 30/500 ML
PLASTIC BAG, INJECTION (ML) INTRAVENOUS
Status: DISPENSED
Start: 2021-06-20

## (undated) RX ORDER — MORPHINE SULFATE 1 MG/ML
INJECTION, SOLUTION EPIDURAL; INTRATHECAL; INTRAVENOUS
Status: DISPENSED
Start: 2021-06-20

## (undated) RX ORDER — DEXTROSE MONOHYDRATE 25 G/50ML
INJECTION, SOLUTION INTRAVENOUS
Status: DISPENSED
Start: 2021-02-13

## (undated) RX ORDER — DEXAMETHASONE SODIUM PHOSPHATE 4 MG/ML
INJECTION, SOLUTION INTRA-ARTICULAR; INTRALESIONAL; INTRAMUSCULAR; INTRAVENOUS; SOFT TISSUE
Status: DISPENSED
Start: 2021-02-13

## (undated) RX ORDER — BUPIVACAINE HYDROCHLORIDE AND EPINEPHRINE 5; 5 MG/ML; UG/ML
INJECTION, SOLUTION EPIDURAL; INTRACAUDAL; PERINEURAL
Status: DISPENSED
Start: 2021-02-13

## (undated) RX ORDER — LIDOCAINE HYDROCHLORIDE 20 MG/ML
INJECTION, SOLUTION EPIDURAL; INFILTRATION; INTRACAUDAL; PERINEURAL
Status: DISPENSED
Start: 2021-02-13

## (undated) RX ORDER — ONDANSETRON 2 MG/ML
INJECTION INTRAMUSCULAR; INTRAVENOUS
Status: DISPENSED
Start: 2021-06-20

## (undated) RX ORDER — FENTANYL CITRATE 0.05 MG/ML
INJECTION, SOLUTION INTRAMUSCULAR; INTRAVENOUS
Status: DISPENSED
Start: 2021-02-13

## (undated) RX ORDER — FENTANYL CITRATE 50 UG/ML
INJECTION, SOLUTION INTRAMUSCULAR; INTRAVENOUS
Status: DISPENSED
Start: 2021-06-20

## (undated) RX ORDER — LIDOCAINE HYDROCHLORIDE 10 MG/ML
INJECTION, SOLUTION EPIDURAL; INFILTRATION; INTRACAUDAL; PERINEURAL
Status: DISPENSED
Start: 2021-02-13

## (undated) RX ORDER — NEOSTIGMINE METHYLSULFATE 1 MG/ML
VIAL (ML) INJECTION
Status: DISPENSED
Start: 2021-02-13

## (undated) RX ORDER — DIPHENHYDRAMINE HYDROCHLORIDE 50 MG/ML
INJECTION INTRAMUSCULAR; INTRAVENOUS
Status: DISPENSED
Start: 2021-06-20

## (undated) RX ORDER — GLYCOPYRROLATE 0.2 MG/ML
INJECTION, SOLUTION INTRAMUSCULAR; INTRAVENOUS
Status: DISPENSED
Start: 2021-02-13

## (undated) RX ORDER — DEXAMETHASONE SODIUM PHOSPHATE 4 MG/ML
INJECTION, SOLUTION INTRA-ARTICULAR; INTRALESIONAL; INTRAMUSCULAR; INTRAVENOUS; SOFT TISSUE
Status: DISPENSED
Start: 2021-06-20

## (undated) RX ORDER — ONDANSETRON 2 MG/ML
INJECTION INTRAMUSCULAR; INTRAVENOUS
Status: DISPENSED
Start: 2021-02-13